# Patient Record
Sex: FEMALE | Race: WHITE | NOT HISPANIC OR LATINO | Employment: OTHER | URBAN - METROPOLITAN AREA
[De-identification: names, ages, dates, MRNs, and addresses within clinical notes are randomized per-mention and may not be internally consistent; named-entity substitution may affect disease eponyms.]

---

## 2017-02-15 ENCOUNTER — OFFICE VISIT (OUTPATIENT)
Dept: CARDIOLOGY | Facility: CLINIC | Age: 62
End: 2017-02-15

## 2017-02-15 VITALS
SYSTOLIC BLOOD PRESSURE: 106 MMHG | WEIGHT: 178.4 LBS | BODY MASS INDEX: 30.46 KG/M2 | DIASTOLIC BLOOD PRESSURE: 68 MMHG | HEIGHT: 64 IN | HEART RATE: 65 BPM

## 2017-02-15 DIAGNOSIS — Z45.018 ELECTIVE REPLACEMENT INDICATED FOR PACEMAKER: ICD-10-CM

## 2017-02-15 DIAGNOSIS — I48.20 CHRONIC ATRIAL FIBRILLATION (HCC): Primary | ICD-10-CM

## 2017-02-15 DIAGNOSIS — I44.30 AVB (ATRIOVENTRICULAR BLOCK): ICD-10-CM

## 2017-02-15 DIAGNOSIS — I07.1 RHEUMATIC TRICUSPID VALVE REGURGITATION: ICD-10-CM

## 2017-02-15 DIAGNOSIS — Q21.10 ASD (ATRIAL SEPTAL DEFECT): ICD-10-CM

## 2017-02-15 PROCEDURE — 93288 INTERROG EVL PM/LDLS PM IP: CPT | Performed by: INTERNAL MEDICINE

## 2017-02-15 RX ORDER — FUROSEMIDE 40 MG/1
TABLET ORAL
Qty: 90 TABLET | Refills: 1 | Status: SHIPPED | OUTPATIENT
Start: 2017-02-15 | End: 2017-08-14 | Stop reason: SDUPTHER

## 2017-02-15 RX ORDER — SPIRONOLACTONE 50 MG/1
TABLET, FILM COATED ORAL
Qty: 90 TABLET | Refills: 1 | Status: SHIPPED | OUTPATIENT
Start: 2017-02-15 | End: 2017-08-14 | Stop reason: SDUPTHER

## 2017-02-15 NOTE — PROGRESS NOTES
Deb Lucianoyajaira  1955  [phone]      02/15/2017    [facility] CARDIOLOGY     No Known Provider  [place] [address]    Chief Complaint   Patient presents with   • Atrial Fibrillation       Problem List:   Atrial Flutter     a. EP study, November 2003, at an outside hospital with no RFA performed.   b. Sotalol initiated, November 2003.   c. Recurrent, persistent atrial flutter with rapid ventricular rate and 1-to-1 AV conduction at 240 BPM.   d. EP study, 04/22/2004, with radiofrequency ablation of atrial flutter around the ASD patch, radiofrequency ablation of isthmus dependent counter clockwise atrial flutter, radiofrequency ablation of right atrial incisional scar flutter and a fourth atrial flutter which was possibly left atrial in origin and was not mapped nor ablated.   e. Recurrent atrial flutter, June 2004.   f. Repeat EP study with successful radiofrequency ablation of a right atrial scar related flutter along the anterolateral wall. Sinus node dysfunction was also demonstrated during that study while she was on both on IV Diltiazem and Digoxin.   g. Echocardiogram, 10/03/2007, with EF 55% to 60%, severe TR, mitral valve area of 1.8 cm² consistent with mild mitral stenosis, LA 4.5.  h. Implantation of a dual chamber permanent pacemaker, 02/25/2008 (Medtronic device).  i. EP study with radiofrequency ablation of atrial tachycardia from the high anterolateral atrial wall, 07/31/2008 with conduction of left atrial flutter, not mapped or ablated.   j. Rythmol increased, 07/31/2008.  k. Hospitalization, 11/13/2014 at [facility] for recurrent atrial tachycardia/flutter treated with cardioversion. Continuation of Norvasc and titration of diltiazem as well as addition of Eliquis therapy.  l. Readmission to [facility] in [place], [place] emergency room secondary to palpitations and syncope on 12/24/2014 with device interrogation  suggesting possible SVT versus ventricular tachycardia.   m. EPS study with RFA for atrial tachycardia, 01/16/2015.   n. Tikosyn initiated 01/16/2015  o. RFA AV Node 4/10/16 and RFA of AT and RA scar flutter   Tr (Tricuspid Regurgitation)     p. DELIA, 04/20/2004 with moderate tricuspid regurgitation, mild holosystolic mitral valve prolapse, marked right atrial enlargement at approximately 7-8 cm, normal LV size and function with LVEF (72%), right enlarged ventricular size and diminished systolic function.   q. Echocardiogram, 02/01/2006: EF (70%), moderately dilated RV, mild TR, mild pulmonary hypertension.   r. Echocardiogram in October 2007: Mild MS, moderate RV enlargement, moderate to severe tricuspid regurgitation, normal LV size and function, RVSP of 45 mmHg.  s. Right heart cath, 08/01/2008 with pulmonary artery pressure of 48/22, RV 53/20, cardiac output 5, cardiac index 2/8, wedge 26.   t. Echocardiogram, April 2009: LVEF (50% to 55%), severe right ventricular enlargement, moderate to severe tricuspid insufficiency, mild to moderate MR, RVSP estimated to be 40-50 mmHg.   u. Echocardiogram, 02/23/2012, with ejection fraction of 50% to 55%, LAE at 4 cm, mild MS with mild restriction of the mitral valve leaflet, prolapse of the posterior leaflet, mean gradient of 3.5 mmHg, mild TR with an RVSP of 31.  v. Echocardiogram, 04/23/2014, revealing EF 45% to 50% with diastolic dysfunction, mild MR, mild TR. RVSP 42.   w. Echo 2/29/16: LVEF NL, mild TR, mild -mod MR, RV severely dilated   Asd (Atrial Septal Defect)     ASD repair at age of 6   Atrial Tachycardia (Resolved)     1. Initiation of Tikosyn on 01/16/2015.   2. Beta blocker added in place of diltiazem.   3. Hospital admission on 04/10/2016.   4. AV node ablation on 04/11/2016, without incident.   5. Discharged on 04/12/2016 feeling good in an AV paced rhythm with a HR of 80.            Allergies  Allergies   Allergen Reactions   • Adenosine Nausea Only   •  "Codeine Nausea Only and Dizziness       Current Medications    Current Outpatient Prescriptions:   •  Dofetilide (TIKOSYN PO), Take 0.5 mg by mouth 2 (two) times a day., Disp: , Rfl:   •  ELIQUIS 5 MG tablet tablet, TAKE 1 TABLET TWICE A DAY, Disp: 180 tablet, Rfl: 2  •  furosemide (LASIX) 40 MG tablet, Take 40 mg by mouth daily., Disp: , Rfl:   •  metoprolol tartrate (LOPRESSOR) 25 MG tablet, Take 1 tablet by mouth daily., Disp: 90 tablet, Rfl: 3  •  rosuvastatin (CRESTOR) 20 MG tablet, Take 20 mg by mouth daily., Disp: , Rfl:   •  spironolactone (ALDACTONE) 50 MG tablet, Take 50 mg by mouth daily., Disp: , Rfl:   •  temazepam (RESTORIL) 30 MG capsule, Take 30 mg by mouth at night as needed for sleep., Disp: , Rfl:     History of Present Illness   HPI    Pt presents for follow up of atrial arrhythmias and PM check. Since we last saw the pt, pt denies any palpitations, CP, LH, and dizziness. Denies any hospitalizations, ER visits, bleeding, or TIA/CVA symptoms. Overall feels well except she is tired, which is chronic.     ROS:  General:  + fatigue, No weight gain or loss  Cardiovascular:  Denies CP, PND, syncope, near syncope, edema + palpitations.  Pulmonary:  Mild BUTT, No cough, or wheezing      Vitals:    02/15/17 1113   BP: 106/68   BP Location: Left arm   Patient Position: Sitting   Pulse: 65   Weight: 178 lb 6.4 oz (80.9 kg)   Height: 64\" (162.6 cm)     PE:  NAD  Neck: no JVD, no carotid bruits, no TM  Heart RRR, NL S1, S2, no rubs, TR murmur  Lungs: CTA  Abd: soft, non-tender, NL BS  Ext: No musculoskeletal deformities    Diagnostic Data:  Procedures    1. Chronic atrial fibrillation    2. AVB (atrioventricular block)    3. ASD (atrial septal defect)    4. Rheumatic tricuspid valve regurgitation          Plan:  1) AF/ tachycardia/flutter s/p RFA AV node: doing well: continue tikosyn  Continue present medications.   2) AVB: Battery at EOL: Will set up for generator change out  3) Anticoagulation CHADVASC " 2  Continue Eliquis          F/up in 6 months    I, Manuel Tavarez MD, personally performed the services described in this documentation as scribed by the above named individual in my presence, and it is both accurate and complete.  2/15/2017  12:12 PM

## 2017-02-20 ENCOUNTER — PREP FOR SURGERY (OUTPATIENT)
Dept: CARDIOLOGY | Facility: CLINIC | Age: 62
End: 2017-02-20

## 2017-02-20 DIAGNOSIS — I44.2 COMPLETE HEART BLOCK (HCC): Primary | ICD-10-CM

## 2017-02-20 RX ORDER — ACETAMINOPHEN 325 MG/1
650 TABLET ORAL EVERY 4 HOURS PRN
Status: CANCELLED | OUTPATIENT
Start: 2017-02-20

## 2017-02-20 RX ORDER — ONDANSETRON 2 MG/ML
4 INJECTION INTRAMUSCULAR; INTRAVENOUS EVERY 6 HOURS PRN
Status: CANCELLED | OUTPATIENT
Start: 2017-02-20

## 2017-02-20 RX ORDER — HYDROCODONE BITARTRATE AND ACETAMINOPHEN 5; 325 MG/1; MG/1
1 TABLET ORAL EVERY 4 HOURS PRN
Status: CANCELLED | OUTPATIENT
Start: 2017-02-20 | End: 2017-03-02

## 2017-02-20 RX ORDER — PROMETHAZINE HYDROCHLORIDE 25 MG/ML
12.5 INJECTION, SOLUTION INTRAMUSCULAR; INTRAVENOUS EVERY 4 HOURS PRN
Status: CANCELLED | OUTPATIENT
Start: 2017-02-20

## 2017-02-25 ENCOUNTER — RESULTS ENCOUNTER (OUTPATIENT)
Dept: CARDIOLOGY | Facility: CLINIC | Age: 62
End: 2017-02-25

## 2017-02-25 DIAGNOSIS — I44.2 COMPLETE HEART BLOCK (HCC): ICD-10-CM

## 2017-03-01 ENCOUNTER — APPOINTMENT (OUTPATIENT)
Dept: PREADMISSION TESTING | Facility: HOSPITAL | Age: 62
End: 2017-03-01

## 2017-03-01 VITALS — HEIGHT: 64 IN | BODY MASS INDEX: 30.39 KG/M2 | WEIGHT: 178 LBS

## 2017-03-01 LAB
ALBUMIN SERPL-MCNC: 4.6 G/DL (ref 3.2–4.8)
ALBUMIN/GLOB SERPL: 1.6 G/DL (ref 1.5–2.5)
ALP SERPL-CCNC: 61 U/L (ref 25–100)
ALT SERPL W P-5'-P-CCNC: 16 U/L (ref 7–40)
ANION GAP SERPL CALCULATED.3IONS-SCNC: 11 MMOL/L (ref 3–11)
AST SERPL-CCNC: 23 U/L (ref 0–33)
BASOPHILS # BLD AUTO: 0.02 10*3/MM3 (ref 0–0.2)
BASOPHILS NFR BLD AUTO: 0.3 % (ref 0–1)
BILIRUB SERPL-MCNC: 1 MG/DL (ref 0.3–1.2)
BUN BLD-MCNC: 15 MG/DL (ref 9–23)
BUN/CREAT SERPL: 18.8 (ref 7–25)
CALCIUM SPEC-SCNC: 10.3 MG/DL (ref 8.7–10.4)
CHLORIDE SERPL-SCNC: 104 MMOL/L (ref 99–109)
CO2 SERPL-SCNC: 25 MMOL/L (ref 20–31)
CREAT BLD-MCNC: 0.8 MG/DL (ref 0.6–1.3)
DEPRECATED RDW RBC AUTO: 47 FL (ref 37–54)
EOSINOPHIL # BLD AUTO: 0.16 10*3/MM3 (ref 0.1–0.3)
EOSINOPHIL NFR BLD AUTO: 2 % (ref 0–3)
ERYTHROCYTE [DISTWIDTH] IN BLOOD BY AUTOMATED COUNT: 13.7 % (ref 11.3–14.5)
GFR SERPL CREATININE-BSD FRML MDRD: 72 ML/MIN/1.73
GLOBULIN UR ELPH-MCNC: 2.8 GM/DL
GLUCOSE BLD-MCNC: 117 MG/DL (ref 70–100)
HBA1C MFR BLD: 6 % (ref 4.8–5.6)
HCT VFR BLD AUTO: 44.9 % (ref 34.5–44)
HGB BLD-MCNC: 14.9 G/DL (ref 11.5–15.5)
IMM GRANULOCYTES # BLD: 0 10*3/MM3 (ref 0–0.03)
IMM GRANULOCYTES NFR BLD: 0 % (ref 0–0.6)
INR PPP: 1.09
LYMPHOCYTES # BLD AUTO: 2.73 10*3/MM3 (ref 0.6–4.8)
LYMPHOCYTES NFR BLD AUTO: 34.2 % (ref 24–44)
MCH RBC QN AUTO: 31 PG (ref 27–31)
MCHC RBC AUTO-ENTMCNC: 33.2 G/DL (ref 32–36)
MCV RBC AUTO: 93.5 FL (ref 80–99)
MONOCYTES # BLD AUTO: 0.53 10*3/MM3 (ref 0–1)
MONOCYTES NFR BLD AUTO: 6.6 % (ref 0–12)
NEUTROPHILS # BLD AUTO: 4.54 10*3/MM3 (ref 1.5–8.3)
NEUTROPHILS NFR BLD AUTO: 56.9 % (ref 41–71)
PLATELET # BLD AUTO: 175 10*3/MM3 (ref 150–450)
PMV BLD AUTO: 10 FL (ref 6–12)
POTASSIUM BLD-SCNC: 4.3 MMOL/L (ref 3.5–5.5)
PROT SERPL-MCNC: 7.4 G/DL (ref 5.7–8.2)
PROTHROMBIN TIME: 11.9 SECONDS (ref 9.6–11.5)
RBC # BLD AUTO: 4.8 10*6/MM3 (ref 3.89–5.14)
SODIUM BLD-SCNC: 140 MMOL/L (ref 132–146)
WBC NRBC COR # BLD: 7.98 10*3/MM3 (ref 3.5–10.8)

## 2017-03-01 PROCEDURE — 85610 PROTHROMBIN TIME: CPT | Performed by: NURSE PRACTITIONER

## 2017-03-01 PROCEDURE — 80053 COMPREHEN METABOLIC PANEL: CPT | Performed by: NURSE PRACTITIONER

## 2017-03-01 PROCEDURE — 36415 COLL VENOUS BLD VENIPUNCTURE: CPT | Performed by: NURSE PRACTITIONER

## 2017-03-01 PROCEDURE — 83036 HEMOGLOBIN GLYCOSYLATED A1C: CPT | Performed by: NURSE PRACTITIONER

## 2017-03-01 PROCEDURE — 85025 COMPLETE CBC W/AUTO DIFF WBC: CPT | Performed by: NURSE PRACTITIONER

## 2017-03-01 RX ORDER — CLOBETASOL PROPIONATE 0.5 MG/G
EMULSION TOPICAL 2 TIMES DAILY
Refills: 5 | COMMUNITY
Start: 2016-11-22 | End: 2018-02-13

## 2017-03-01 RX ORDER — POLYETHYLENE GLYCOL 3350 17 G/17G
17 POWDER, FOR SOLUTION ORAL DAILY PRN
COMMUNITY
End: 2020-08-11

## 2017-03-01 RX ORDER — LEVOTHYROXINE SODIUM 0.07 MG/1
75 TABLET ORAL DAILY
Refills: 5 | COMMUNITY
Start: 2017-02-16 | End: 2021-01-01 | Stop reason: DRUGHIGH

## 2017-03-01 NOTE — DISCHARGE INSTRUCTIONS
The following instructions given during Pre Admission Testing visit:    Do not eat or drink anything after MN except for sips of water with your a.m. Prescription meds unless otherwise instructed by your physician.    Glasses and jewelry may be worn, but dentures must be removed prior to cath/procedure.    Leave any items you consider valuable at home.    Family members may wait in [facility] waiting area during procedure.    Bring all medications in their original containers the day of procedure.    Bring photo ID and insurance cards on the day of procedure.    Need to make arrangements for transportation prior to discharge.    Patient to apply Chlorhexadine wipes  to surgical area (as instructed) the night before procedure and the AM of procedure. Wipes provided.

## 2017-03-02 ENCOUNTER — HOSPITAL ENCOUNTER (OUTPATIENT)
Facility: HOSPITAL | Age: 62
Discharge: HOME | End: 2017-03-02
Attending: INTERNAL MEDICINE | Admitting: INTERNAL MEDICINE

## 2017-03-02 VITALS
RESPIRATION RATE: 15 BRPM | HEIGHT: 64 IN | WEIGHT: 179.01 LBS | TEMPERATURE: 98.1 F | SYSTOLIC BLOOD PRESSURE: 124 MMHG | BODY MASS INDEX: 30.56 KG/M2 | OXYGEN SATURATION: 94 % | HEART RATE: 70 BPM | DIASTOLIC BLOOD PRESSURE: 78 MMHG

## 2017-03-02 DIAGNOSIS — Z45.018 ELECTIVE REPLACEMENT INDICATED FOR PACEMAKER: ICD-10-CM

## 2017-03-02 DIAGNOSIS — I44.2 COMPLETE HEART BLOCK (HCC): ICD-10-CM

## 2017-03-02 PROCEDURE — G0378 HOSPITAL OBSERVATION PER HR: HCPCS

## 2017-03-02 PROCEDURE — 25010000002 VANCOMYCIN HCL IN NACL 1.5-0.9 GM/500ML-% SOLUTION: Performed by: NURSE PRACTITIONER

## 2017-03-02 PROCEDURE — 99152 MOD SED SAME PHYS/QHP 5/>YRS: CPT | Performed by: INTERNAL MEDICINE

## 2017-03-02 PROCEDURE — 25010000002 MIDAZOLAM PER 1 MG: Performed by: INTERNAL MEDICINE

## 2017-03-02 PROCEDURE — C1785 PMKR, DUAL, RATE-RESP: HCPCS | Performed by: INTERNAL MEDICINE

## 2017-03-02 PROCEDURE — 25010000002 ONDANSETRON PER 1 MG: Performed by: INTERNAL MEDICINE

## 2017-03-02 PROCEDURE — 25010000002 FENTANYL CITRATE (PF) 100 MCG/2ML SOLUTION: Performed by: INTERNAL MEDICINE

## 2017-03-02 PROCEDURE — 33228 REMV&REPLC PM GEN DUAL LEAD: CPT | Performed by: INTERNAL MEDICINE

## 2017-03-02 DEVICE — GEN PM ADAPTA DR MVP 41.7MM ADDR01: Type: IMPLANTABLE DEVICE | Site: CHEST WALL | Status: FUNCTIONAL

## 2017-03-02 RX ORDER — DOFETILIDE 0.5 MG/1
500 CAPSULE ORAL 2 TIMES DAILY
Status: DISCONTINUED | OUTPATIENT
Start: 2017-03-02 | End: 2017-03-02 | Stop reason: HOSPADM

## 2017-03-02 RX ORDER — ACETAMINOPHEN 325 MG/1
650 TABLET ORAL EVERY 4 HOURS PRN
Status: DISCONTINUED | OUTPATIENT
Start: 2017-03-02 | End: 2017-03-02 | Stop reason: HOSPADM

## 2017-03-02 RX ORDER — VANCOMYCIN/0.9 % SOD CHLORIDE 1.5G/250ML
20 PLASTIC BAG, INJECTION (ML) INTRAVENOUS
Status: COMPLETED | OUTPATIENT
Start: 2017-03-02 | End: 2017-03-02

## 2017-03-02 RX ORDER — SODIUM CHLORIDE 0.9 % (FLUSH) 0.9 %
1-10 SYRINGE (ML) INJECTION AS NEEDED
Status: DISCONTINUED | OUTPATIENT
Start: 2017-03-02 | End: 2017-03-02 | Stop reason: HOSPADM

## 2017-03-02 RX ORDER — SODIUM CHLORIDE 9 MG/ML
INJECTION, SOLUTION INTRAVENOUS CONTINUOUS PRN
Status: DISCONTINUED | OUTPATIENT
Start: 2017-03-02 | End: 2017-03-02 | Stop reason: HOSPADM

## 2017-03-02 RX ORDER — ONDANSETRON 2 MG/ML
4 INJECTION INTRAMUSCULAR; INTRAVENOUS EVERY 6 HOURS PRN
Status: DISCONTINUED | OUTPATIENT
Start: 2017-03-02 | End: 2017-03-02 | Stop reason: HOSPADM

## 2017-03-02 RX ORDER — SPIRONOLACTONE 50 MG/1
50 TABLET, FILM COATED ORAL DAILY
Status: DISCONTINUED | OUTPATIENT
Start: 2017-03-02 | End: 2017-03-02 | Stop reason: HOSPADM

## 2017-03-02 RX ORDER — ONDANSETRON 2 MG/ML
4 INJECTION INTRAMUSCULAR; INTRAVENOUS EVERY 6 HOURS PRN
Status: DISCONTINUED | OUTPATIENT
Start: 2017-03-02 | End: 2017-03-02

## 2017-03-02 RX ORDER — FUROSEMIDE 40 MG/1
40 TABLET ORAL DAILY
Status: DISCONTINUED | OUTPATIENT
Start: 2017-03-02 | End: 2017-03-02 | Stop reason: HOSPADM

## 2017-03-02 RX ORDER — PROMETHAZINE HYDROCHLORIDE 25 MG/ML
12.5 INJECTION, SOLUTION INTRAMUSCULAR; INTRAVENOUS EVERY 4 HOURS PRN
Status: DISCONTINUED | OUTPATIENT
Start: 2017-03-02 | End: 2017-03-02 | Stop reason: HOSPADM

## 2017-03-02 RX ORDER — MIDAZOLAM HYDROCHLORIDE 1 MG/ML
INJECTION INTRAMUSCULAR; INTRAVENOUS AS NEEDED
Status: DISCONTINUED | OUTPATIENT
Start: 2017-03-02 | End: 2017-03-02 | Stop reason: HOSPADM

## 2017-03-02 RX ORDER — LIDOCAINE HYDROCHLORIDE 10 MG/ML
INJECTION, SOLUTION INFILTRATION; PERINEURAL AS NEEDED
Status: DISCONTINUED | OUTPATIENT
Start: 2017-03-02 | End: 2017-03-02 | Stop reason: HOSPADM

## 2017-03-02 RX ORDER — ONDANSETRON 2 MG/ML
INJECTION INTRAMUSCULAR; INTRAVENOUS AS NEEDED
Status: DISCONTINUED | OUTPATIENT
Start: 2017-03-02 | End: 2017-03-02 | Stop reason: HOSPADM

## 2017-03-02 RX ORDER — ATORVASTATIN CALCIUM 40 MG/1
40 TABLET, FILM COATED ORAL DAILY
Status: DISCONTINUED | OUTPATIENT
Start: 2017-03-02 | End: 2017-03-02 | Stop reason: HOSPADM

## 2017-03-02 RX ORDER — HYDROCODONE BITARTRATE AND ACETAMINOPHEN 5; 325 MG/1; MG/1
1 TABLET ORAL EVERY 4 HOURS PRN
Status: DISCONTINUED | OUTPATIENT
Start: 2017-03-02 | End: 2017-03-02 | Stop reason: HOSPADM

## 2017-03-02 RX ORDER — LEVOTHYROXINE SODIUM 0.03 MG/1
25 TABLET ORAL DAILY
Status: DISCONTINUED | OUTPATIENT
Start: 2017-03-02 | End: 2017-03-02 | Stop reason: HOSPADM

## 2017-03-02 RX ORDER — FENTANYL CITRATE 50 UG/ML
INJECTION, SOLUTION INTRAMUSCULAR; INTRAVENOUS AS NEEDED
Status: DISCONTINUED | OUTPATIENT
Start: 2017-03-02 | End: 2017-03-02 | Stop reason: HOSPADM

## 2017-03-02 RX ORDER — BUPIVACAINE HYDROCHLORIDE 5 MG/ML
INJECTION, SOLUTION EPIDURAL; INTRACAUDAL AS NEEDED
Status: DISCONTINUED | OUTPATIENT
Start: 2017-03-02 | End: 2017-03-02 | Stop reason: HOSPADM

## 2017-03-02 RX ADMIN — Medication 1500 MG: at 08:06

## 2017-03-02 NOTE — PLAN OF CARE
Problem: Patient Care Overview (Adult)  Goal: Plan of Care Review  Outcome: Ongoing (interventions implemented as appropriate)    03/02/17 1120   Coping/Psychosocial Response Interventions   Plan Of Care Reviewed With patient;family   Patient Care Overview   Progress improving   Outcome Evaluation   Outcome Summary/Follow up Plan PT AND FAMILY ABLE TO VERBALIZE UNDERSTANDING OF DC INSTRUCTIONS- NO QUESTIONS AT THIS TIME- SITE STABLE AND AMBULATING WITH NO ISSUES.        Goal: Discharge Needs Assessment  Outcome: Ongoing (interventions implemented as appropriate)    03/02/17 1120   Discharge Needs Assessment   Concerns To Be Addressed no discharge needs identified         Problem: Cardiac Rhythm Management Device (Adult)  Goal: Signs and Symptoms of Listed Potential Problems Will be Absent or Manageable (Cardiac Rhythm Management Device)  Outcome: Ongoing (interventions implemented as appropriate)    03/02/17 1120   Cardiac Rhythm Management Device   Problems Assessed (Cardiac Rhythm Management Device) all   Problems Present (Cardiac Rhythm Management Device) none

## 2017-03-02 NOTE — NURSING NOTE
Pt does not have a qualifying diagnosis for Phase II Cardiac Rehab at this time. Staff is available if further contact is needed.

## 2017-03-02 NOTE — H&P
Cardiology     Deb Lucianoyajaira  1955  [phone]      03/02/17      [facility]    Minerva Bucio MD   [facility]  / [place] [id number]    CC: PM generator change    PROBLEM LIST:  1. Atrial flutter:  a. EP study, November 2003, at an outside hospital with no RFA performed.   b. Sotalol initiated, November 2003.   c. Recurrent, persistent atrial flutter with rapid ventricular rate and 1-to-1 AV conduction at 240 BPM.   d. EP study, 04/22/2004, with radiofrequency ablation of atrial flutter around the ASD patch, radiofrequency ablation of isthmus dependent counter clockwise atrial flutter, radiofrequency ablation of right atrial incisional scar flutter and a fourth atrial flutter which was possibly left atrial in origin and was not mapped nor ablated.   e. Recurrent atrial flutter, June 2004.   f. Repeat EP study with successful radiofrequency ablation of a right atrial scar related flutter along the anterolateral wall. Sinus node dysfunction was also demonstrated during that study while she was on both on IV Diltiazem and Digoxin.   g. Echocardiogram, 10/03/2007, with EF 55% to 60%, severe TR, mitral valve area of 1.8 cm² consistent with mild mitral stenosis, LA 4.5.  h. Implantation of a dual chamber permanent pacemaker, 02/25/2008 (Medtronic device).  i. EP study with radiofrequency ablation of atrial tachycardia from the high anterolateral atrial wall, 07/31/2008 with conduction of left atrial flutter, not mapped or ablated.   j. Rythmol increased, 07/31/2008.  k. Hospitalization, 11/13/2014 at [facility] for recurrent atrial tachycardia/flutter treated with cardioversion. Continuation of Norvasc and titration of diltiazem as well as addition of Eliquis therapy.  l. Readmission to [facility] in [place], [place] emergency room secondary to palpitations and syncope on 12/24/2014 with device interrogation suggesting possible SVT versus ventricular  tachycardia.   m. EPS study with RFA for atrial tachycardia, 01/16/2015.   n. Tikosyn initiated 01/16/2015.  o.  AVN RFA 4/10/16 and RFA AT and RA scar flutter  2. Junctional rhythm.  3. ASD repair at the age of 6.   4. Tricuspid regurgitation:   a. DELIA, 04/20/2004 with moderate tricuspid regurgitation, mild holosystolic mitral valve prolapse, marked right atrial enlargement at approximately 7-8 cm, normal LV size and function with LVEF (72%), right enlarged ventricular size and diminished systolic function.   b. Echocardiogram, 02/01/2006: EF (70%), moderately dilated RV, mild TR, mild pulmonary hypertension.   c. Echocardiogram in October 2007: Mild MS, moderate RV enlargement, moderate to severe tricuspid regurgitation, normal LV size and function, RVSP of 45 mmHg.  d. Right heart cath, 08/01/2008 with pulmonary artery pressure of 48/22, RV 53/20, cardiac output 5, cardiac index 2/8, wedge 26.   e. Echocardiogram, April 2009: LVEF (50% to 55%), severe right ventricular enlargement, moderate to severe tricuspid insufficiency, mild to moderate MR, RVSP estimated to be 40-50 mmHg.   f. Echocardiogram, 02/23/2012, with ejection fraction of 50% to 55%, LAE at 4 cm, mild MS with mild restriction of the mitral valve leaflet, prolapse of the posterior leaflet, mean gradient of 3.5 mmHg, mild TR with an RVSP of 31.  g. Echocardiogram, 04/23/2014, revealing EF 45% to 50% with diastolic dysfunction, mild MR, mild TR. RVSP 42.   5. Tobacco abuse.   6. Seasonal allergies.   7. Right hip surgery, 06/04/2015.      History of Present Illness:   61 year old WF with history of SSS s/p PM implant in 2008, atrial arrhythmias s/p multiple ablations and ultimately AVN ablation, VHD, and ASD s/p repair who presents today for generator change of her Pacemaker as it has reached EOL. She was seen in clinic a couple weeks ago and was doing well at that time. Since then, she continues to do well and has had no changes in her symptoms since  that time.     Allergies   Allergen Reactions   • Adenosine Nausea Only   • Codeine Nausea Only and Dizziness       Prior to Admission Medications     Prescriptions Last Dose Informant Patient Reported? Taking?    CLOBETASOL PROPIONATE E 0.05 % emollient cream 3/1/2017 Medication Bottle Yes Yes    Apply  topically 2 (Two) Times a Day.    Dofetilide (TIKOSYN PO) 3/1/2017 Medication Bottle Yes Yes    Take 0.5 mg by mouth 2 (two) times a day.    ELIQUIS 5 MG tablet tablet 3/1/2017 Medication Bottle No Yes    TAKE 1 TABLET TWICE A DAY    furosemide (LASIX) 40 MG tablet 3/1/2017 Medication Bottle No Yes    TAKE 1 TABLET DAILY    levothyroxine (SYNTHROID, LEVOTHROID) 25 MCG tablet 3/1/2017 Medication Bottle Yes Yes    Take 25 mcg by mouth Daily.    metoprolol tartrate (LOPRESSOR) 25 MG tablet 3/1/2017 Medication Bottle No Yes    Take 1 tablet by mouth daily.    polyethylene glycol (MIRALAX) packet 3/1/2017 Medication Bottle Yes Yes    Take 17 g by mouth Daily.    rosuvastatin (CRESTOR) 20 MG tablet 3/1/2017 Medication Bottle Yes Yes    Take 20 mg by mouth daily.    spironolactone (ALDACTONE) 50 MG tablet 3/1/2017 Medication Bottle No Yes    TAKE 1 TABLET DAILY    temazepam (RESTORIL) 30 MG capsule 3/1/2017 Medication Bottle Yes Yes    Take 30 mg by mouth at night as needed for sleep.            Current Facility-Administered Medications:   •  acetaminophen (TYLENOL) tablet 650 mg, 650 mg, Oral, Q4H PRN, NIRU Lawrence  •  HYDROcodone-acetaminophen (NORCO) 5-325 MG per tablet 1 tablet, 1 tablet, Oral, Q4H PRN, NIRU Lawrence  •  ondansetron (ZOFRAN) injection 4 mg, 4 mg, Intravenous, Q6H PRN, NIRU Lawrence  •  promethazine (PHENERGAN) injection 12.5 mg, 12.5 mg, Intravenous, Q4H PRN, NIRU Lawrence  •  vancomycin IVPB 1500 mg in 0.9% NaCl (Premix) 500 mL, 20 mg/kg (Order-Specific), Intravenous, 60 Min Pre-Op, NIRU Lawrence    Social History     Social History   • Marital status:      Spouse  "name: N/A   • Number of children: N/A   • Years of education: N/A     Social History Main Topics   • Smoking status: Former Smoker     Packs/day: 1.00     Years: 30.00     Types: Cigarettes     Quit date: 2008   • Smokeless tobacco: Never Used      Comment: quit in 2008   • Alcohol use No   • Drug use: No   • Sexual activity: Defer     Other Topics Concern   • None     Social History Narrative       Family History: history of DM, 1 brother with melanoma and sister with diabetes    REVIEW OF SYSTEMS:   CONST:  No weight loss, fever, chills, weakness or fatigue.   HEENT:  No visual loss, blurred vision, double vision, yellow sclerae.                   No hearing loss, congestion, sore throat.   SKIN:      No rashes, urticaria, ulcers, sores.     RESP:     No shortness of breath, hemoptysis, cough, sputum.   GI:           No anorexia, nausea, vomiting, diarrhea. No abdominal pain, melena.   :         No burning on urination, hematuria or increased frequency.  ENDO:    No diaphoresis, cold or heat intolerance. No polyuria or polydipsia.   NEURO:  No headache, dizziness, syncope, paralysis, ataxia, or parasthesias.                  No change in bowel or bladder control. No history of CVA/TIA  MUSC:    No muscle, back pain, joint pain or stiffness.   HEME:    No anemia, bleeding, bruising. No history of DVT/PE.  PSYCH:  No history of depression, anxiety    Vitals:    03/02/17 0630 03/02/17 0631   BP: 134/68 131/64   BP Location: Right arm Left arm   Patient Position: Lying Lying   Pulse: 65    Resp: 16    Temp: 98.1 °F (36.7 °C)    TempSrc: Tympanic    SpO2: 92%    Weight: 179 lb 0.2 oz (81.2 kg)    Height: 64\" (162.6 cm)        Physical Exam:  GEN: Well nourished, Well- developed  No acute distress  HEENT: Normocephalic, Atraumatic, PERRLA, moist mucous membranes  NECK: supple, NO JVD, no thyromegaly, no lymphadenopathy  CARD: S1S2  RRR no murmur, gallop, rub  LUNGS: Clear to auscultataion, normal respiratory " effort  ABDOMEN: Soft, nontender, normal bowel sounds  EXTREMITIES:No gross deformities,  No clubbing, cyanosis, or edema  SKIN: Warm, dry, intact, no rashes, open wounds  NEURO: No focal deficits  PSYCHIATRIC: Normal affect and mood      Data:     Results from last 7 days  Lab Units 03/01/17  1605   WBC 10*3/mm3 7.98   HEMOGLOBIN g/dL 14.9   HEMATOCRIT % 44.9*   PLATELETS 10*3/mm3 175       Results from last 7 days  Lab Units 03/01/17  1605   SODIUM mmol/L 140   POTASSIUM mmol/L 4.3   CHLORIDE mmol/L 104   TOTAL CO2 mmol/L 25.0   BUN mg/dL 15   CREATININE mg/dL 0.80   GLUCOSE mg/dL 117*        Results from last 7 days  Lab Units 03/01/17  1605   HEMOGLOBIN A1C % 6.00*                   Results from last 7 days  Lab Units 03/01/17  1605   PROTIME Seconds 11.9*   INR  1.09           Tele: V paced      Assessment and Plan:   1. Elective replacement indicated for pacemaker    2. Complete heart block      1) AF/ tachycardia/flutter s/p RFA AV node: doing well: continue tikosyn  2) AVB s/p AVN ablation: Battery at EOL on PM:for generator change out today. The risks, benefits, and alternatives of the procedure have been reviewed and the patient wishes to proceed.   3) Anticoagulation CHADVASC 2  Continue Nasrinquis    [facility]  3/2/2017   7:15 AM        IManuel MD, personally performed the services described as documented by the above named individual. I have made any necessary edits and it is both accurate and complete 3/2/2017  8:12 AM

## 2017-03-10 ENCOUNTER — OFFICE VISIT (OUTPATIENT)
Dept: CARDIOLOGY | Facility: CLINIC | Age: 62
End: 2017-03-10

## 2017-03-10 DIAGNOSIS — I44.2 COMPLETE HEART BLOCK (HCC): Primary | ICD-10-CM

## 2017-03-10 PROCEDURE — 99024 POSTOP FOLLOW-UP VISIT: CPT | Performed by: INTERNAL MEDICINE

## 2017-03-10 NOTE — PROGRESS NOTES
WOUND CHECK    03/10/2017    Deb Harrison, : 1955    WOUND CHECK    B/P: 110/68 (Sitting)    Pulse: 81    Patient has fever: [] Temperature if indicated: 97.5    Wound Location: Left Infraclavicular     Dressing Removed [x]        Old Dressing Appearance:  Clean, dry []                 Old, bloody drainage [x]                            Moist, serous drainage []                Moist, thick yellow/green drainage []       Wound Appearance: Redness []                  Drainage []                  Culture obtained []        Color: N/A     Consistency: N/A     Amount: none         Gloves used, wound cleansed with sterile 4x4 and peroxide [x]       MD notified [] MD orders:     Antibiotic started []  If checked, type   Other:     Appointment for follow-up scheduled for 3 months post procedure [x]    Future Appointments  Date Time Provider Department Center   2017 10:15 AM PACEART, [facility] None   2017 11:00 AM MD ANDREW Bates [facility] None           Elizabeth June, 03/10/17      MD Signature:______________________________ Completed By/Date:

## 2017-06-13 ENCOUNTER — OFFICE VISIT (OUTPATIENT)
Dept: CARDIOLOGY | Facility: CLINIC | Age: 62
End: 2017-06-13

## 2017-06-13 VITALS
HEART RATE: 76 BPM | DIASTOLIC BLOOD PRESSURE: 68 MMHG | SYSTOLIC BLOOD PRESSURE: 116 MMHG | HEIGHT: 69 IN | BODY MASS INDEX: 25.18 KG/M2 | WEIGHT: 170 LBS

## 2017-06-13 DIAGNOSIS — Q21.10 ASD (ATRIAL SEPTAL DEFECT): ICD-10-CM

## 2017-06-13 DIAGNOSIS — I44.30 AVB (ATRIOVENTRICULAR BLOCK): ICD-10-CM

## 2017-06-13 DIAGNOSIS — I48.4 ATYPICAL ATRIAL FLUTTER (HCC): Primary | ICD-10-CM

## 2017-06-13 DIAGNOSIS — I07.1 RHEUMATIC TRICUSPID VALVE REGURGITATION: ICD-10-CM

## 2017-06-13 PROCEDURE — 99213 OFFICE O/P EST LOW 20 MIN: CPT | Performed by: INTERNAL MEDICINE

## 2017-06-13 PROCEDURE — 93280 PM DEVICE PROGR EVAL DUAL: CPT | Performed by: INTERNAL MEDICINE

## 2017-06-13 NOTE — PROGRESS NOTES
Deb Lucianoyajaira  1955  [phone]      06/13/2017    [facility] CARDIOLOGY     Minerva Bucio MD  [address] [place] [address] DR THERESA RODRIGUEZ [id number]    Chief Complaint   Patient presents with   • Slow Heart Rate   • Rapid Heart Rate       Problem List:  Atrial Flutter      a. EP study, November 2003, at an outside hospital with no RFA performed.   b. Sotalol initiated, November 2003.   c. Recurrent, persistent atrial flutter with rapid ventricular rate and 1-to-1 AV conduction at 240 BPM.   d. EP study, 04/22/2004, with radiofrequency ablation of atrial flutter around the ASD patch, radiofrequency ablation of isthmus dependent counter clockwise atrial flutter, radiofrequency ablation of right atrial incisional scar flutter and a fourth atrial flutter which was possibly left atrial in origin and was not mapped nor ablated.   e. Recurrent atrial flutter, June 2004.   f. Repeat EP study with successful radiofrequency ablation of a right atrial scar related flutter along the anterolateral wall. Sinus node dysfunction was also demonstrated during that study while she was on both on IV Diltiazem and Digoxin.   g. Echocardiogram, 10/03/2007, with EF 55% to 60%, severe TR, mitral valve area of 1.8 cm² consistent with mild mitral stenosis, LA 4.5.  h. Implantation of a dual chamber permanent pacemaker, 02/25/2008 (Medtronic device).  i. EP study with radiofrequency ablation of atrial tachycardia from the high anterolateral atrial wall, 07/31/2008 with conduction of left atrial flutter, not mapped or ablated.   j. Rythmol increased, 07/31/2008.  k. Hospitalization, 11/13/2014 at [facility] for recurrent atrial tachycardia/flutter treated with cardioversion. Continuation of Norvasc and titration of diltiazem as well as addition of Eliquis therapy.  l. Readmission to [facility] in [place], [place] emergency room secondary to palpitations and syncope on 12/24/2014 with  device interrogation suggesting possible SVT versus ventricular tachycardia.   m. EPS study with RFA for atrial tachycardia, 01/16/2015.   n. Tikosyn initiated 01/16/2015  o. RFA AV Node 4/10/16 and RFA of AT and RA scar flutter   Tr (Tricuspid Regurgitation)      p. DELIA, 04/20/2004 with moderate tricuspid regurgitation, mild holosystolic mitral valve prolapse, marked right atrial enlargement at approximately 7-8 cm, normal LV size and function with LVEF (72%), right enlarged ventricular size and diminished systolic function.   q. Echocardiogram, 02/01/2006: EF (70%), moderately dilated RV, mild TR, mild pulmonary hypertension.   r. Echocardiogram in October 2007: Mild MS, moderate RV enlargement, moderate to severe tricuspid regurgitation, normal LV size and function, RVSP of 45 mmHg.  s. Right heart cath, 08/01/2008 with pulmonary artery pressure of 48/22, RV 53/20, cardiac output 5, cardiac index 2/8, wedge 26.   t. Echocardiogram, April 2009: LVEF (50% to 55%), severe right ventricular enlargement, moderate to severe tricuspid insufficiency, mild to moderate MR, RVSP estimated to be 40-50 mmHg.   u. Echocardiogram, 02/23/2012, with ejection fraction of 50% to 55%, LAE at 4 cm, mild MS with mild restriction of the mitral valve leaflet, prolapse of the posterior leaflet, mean gradient of 3.5 mmHg, mild TR with an RVSP of 31.  v. Echocardiogram, 04/23/2014, revealing EF 45% to 50% with diastolic dysfunction, mild MR, mild TR. RVSP 42.   w. Echo 2/29/16: LVEF NL, mild TR, mild -mod MR, RV severely dilated   Asd (Atrial Septal Defect)      ASD repair at age of 6     AVB       PM generator change out 3/2/17    Atrial Tachycardia (Resolved)      1. Initiation of Tikosyn on 01/16/2015.   2. Beta blocker added in place of diltiazem.   3. Hospital admission on 04/10/2016.   4. AV node ablation on 04/11/2016, without incident.     .                 Allergies  Allergies   Allergen Reactions   • Adenosine Nausea Only   •  "Codeine Nausea Only and Dizziness       Current Medications    Current Outpatient Prescriptions:   •  apixaban (ELIQUIS) 5 MG tablet tablet, First dose on march 4, Disp: 180 tablet, Rfl: 2  •  CLOBETASOL PROPIONATE E 0.05 % emollient cream, Apply  topically 2 (Two) Times a Day., Disp: , Rfl: 5  •  Dofetilide (TIKOSYN PO), Take 0.5 mg by mouth 2 (two) times a day., Disp: , Rfl:   •  furosemide (LASIX) 40 MG tablet, TAKE 1 TABLET DAILY, Disp: 90 tablet, Rfl: 1  •  levothyroxine (SYNTHROID, LEVOTHROID) 25 MCG tablet, Take 25 mcg by mouth Daily., Disp: , Rfl: 5  •  metoprolol tartrate (LOPRESSOR) 25 MG tablet, Take 1 tablet by mouth daily., Disp: 90 tablet, Rfl: 3  •  polyethylene glycol (MIRALAX) packet, Take 17 g by mouth Daily., Disp: , Rfl:   •  rosuvastatin (CRESTOR) 20 MG tablet, Take 20 mg by mouth daily., Disp: , Rfl:   •  spironolactone (ALDACTONE) 50 MG tablet, TAKE 1 TABLET DAILY, Disp: 90 tablet, Rfl: 1  •  temazepam (RESTORIL) 30 MG capsule, Take 30 mg by mouth at night as needed for sleep., Disp: , Rfl:     History of Present Illness   HPI     Pt presents for follow up of AF/AF/AFL/AVB/CHD.Since the pt has seen us in clinic last, pt denies any syncope, CP, LH, and dizziness. Denies any hospitalizations, ER visits, bleeding, or TIA/CVA symptoms. Overall feels well except for fatigue and chronic SOB on oxygen. Tolerating the tikosyn well.    ROS:  General:  + fatigue, No weight gain or loss  Cardiovascular:  Denies CP, PND, syncope, near syncope, edema,  rare palpitations.  Pulmonary:   Chronic BUTT, No cough, or wheezing    Vitals:    06/13/17 1122   BP: 116/68   BP Location: Left arm   Pulse: 76   Weight: 170 lb (77.1 kg)   Height: 69\" (175.3 cm)       PE:  General: NAD  Neck: no JVD, no carotid bruits, no TM  Heart RRR, NL S1, S2, S4 present, no rubs, TR murmur present  Lungs: CTA, no wheezes, rhonchi, or rales  Abd: soft, non-tender, NL BS  Ext: No musculoskeletal deformities, no edema, cyanosis, or " clubbing  Psych: normal mood and affect    Diagnostic Data:  Procedures      1. Atypical atrial flutter    2. Rheumatic tricuspid valve regurgitation    3. ASD (atrial septal defect)    4. AVB (atrioventricular block)        PM Interrogation: NL PM fxn, Nl battery fxn, No AF     Plan:     Plan:  1) AF/ tachycardia/flutter s/p RFA AV node: doing well: continue tikosyn  Continue present medications.   2) AVB: PM fxn NL  3) Anticoagulation CHADVASC 2  Continue Eliquis  4) CHD    F/up in 6 months

## 2017-07-05 RX ORDER — APIXABAN 5 MG/1
TABLET, FILM COATED ORAL
Qty: 180 TABLET | Refills: 1 | Status: SHIPPED | OUTPATIENT
Start: 2017-07-05 | End: 2018-01-01 | Stop reason: SDUPTHER

## 2017-08-14 RX ORDER — FUROSEMIDE 40 MG/1
TABLET ORAL
Qty: 90 TABLET | Refills: 3 | Status: SHIPPED | OUTPATIENT
Start: 2017-08-14 | End: 2018-08-09 | Stop reason: SDUPTHER

## 2017-08-14 RX ORDER — SPIRONOLACTONE 50 MG/1
TABLET, FILM COATED ORAL
Qty: 90 TABLET | Refills: 3 | Status: SHIPPED | OUTPATIENT
Start: 2017-08-14 | End: 2018-11-19 | Stop reason: SDUPTHER

## 2017-08-25 RX ORDER — DOFETILIDE 0.5 MG/1
CAPSULE ORAL
Qty: 180 CAPSULE | Refills: 2 | Status: SHIPPED | OUTPATIENT
Start: 2017-08-25 | End: 2018-05-22 | Stop reason: SDUPTHER

## 2017-09-28 ENCOUNTER — TELEPHONE (OUTPATIENT)
Dept: CARDIOLOGY | Facility: CLINIC | Age: 62
End: 2017-09-28

## 2017-09-28 PROCEDURE — 93294 REM INTERROG EVL PM/LDLS PM: CPT | Performed by: INTERNAL MEDICINE

## 2017-09-28 PROCEDURE — 93296 REM INTERROG EVL PM/IDS: CPT | Performed by: INTERNAL MEDICINE

## 2017-09-28 NOTE — TELEPHONE ENCOUNTER
"Called pt to ask if I could help her set her home monitor up and she stated she was \"busy\" and would call me back.  "

## 2017-10-04 ENCOUNTER — CLINICAL SUPPORT NO REQUIREMENTS (OUTPATIENT)
Dept: CARDIOLOGY | Facility: CLINIC | Age: 62
End: 2017-10-04

## 2017-10-04 DIAGNOSIS — I44.30 ATRIOVENTRICULAR BLOCK: ICD-10-CM

## 2018-01-02 RX ORDER — APIXABAN 5 MG/1
TABLET, FILM COATED ORAL
Qty: 180 TABLET | Refills: 3 | Status: ON HOLD | OUTPATIENT
Start: 2018-01-02 | End: 2018-08-29

## 2018-01-08 ENCOUNTER — OFFICE VISIT CONVERTED (OUTPATIENT)
Dept: FAMILY MEDICINE CLINIC | Facility: CLINIC | Age: 63
End: 2018-01-08
Attending: FAMILY MEDICINE

## 2018-01-24 ENCOUNTER — OFFICE VISIT CONVERTED (OUTPATIENT)
Dept: PULMONOLOGY | Facility: CLINIC | Age: 63
End: 2018-01-24
Attending: INTERNAL MEDICINE

## 2018-02-13 ENCOUNTER — OFFICE VISIT (OUTPATIENT)
Dept: CARDIOLOGY | Facility: CLINIC | Age: 63
End: 2018-02-13

## 2018-02-13 VITALS
WEIGHT: 170 LBS | DIASTOLIC BLOOD PRESSURE: 70 MMHG | BODY MASS INDEX: 29.02 KG/M2 | HEART RATE: 79 BPM | HEIGHT: 64 IN | SYSTOLIC BLOOD PRESSURE: 100 MMHG

## 2018-02-13 DIAGNOSIS — I44.2 CHB (COMPLETE HEART BLOCK) (HCC): ICD-10-CM

## 2018-02-13 DIAGNOSIS — Q21.10 ASD (ATRIAL SEPTAL DEFECT): ICD-10-CM

## 2018-02-13 DIAGNOSIS — R06.09 DOE (DYSPNEA ON EXERTION): Primary | ICD-10-CM

## 2018-02-13 DIAGNOSIS — I38 VHD (VALVULAR HEART DISEASE): ICD-10-CM

## 2018-02-13 DIAGNOSIS — I47.1 ATRIAL TACHYCARDIA (HCC): ICD-10-CM

## 2018-02-13 DIAGNOSIS — I48.4 ATYPICAL ATRIAL FLUTTER (HCC): ICD-10-CM

## 2018-02-13 PROCEDURE — 99214 OFFICE O/P EST MOD 30 MIN: CPT | Performed by: INTERNAL MEDICINE

## 2018-02-13 PROCEDURE — 93280 PM DEVICE PROGR EVAL DUAL: CPT | Performed by: INTERNAL MEDICINE

## 2018-02-13 NOTE — PROGRESS NOTES
Deb Harrison  1955  [phone]      02/13/2018    [facility] CARDIOLOGY     Minerva Bucio MD  [address] [place] [address] DR THERESA RODRIGUEZ [id number]    Chief Complaint   Patient presents with   • Atrial Fibrillation   • Slow Heart Rate       Problem List:  Problem List:      Atrial Flutter      a. EP study, November 2003, at an outside hospital with no RFA performed.   b. Sotalol initiated, November 2003.   c. Recurrent, persistent atrial flutter with rapid ventricular rate and 1-to-1 AV conduction at 240 BPM.   d. EP study, 04/22/2004, with radiofrequency ablation of atrial flutter around the ASD patch, radiofrequency ablation of isthmus dependent counter clockwise atrial flutter, radiofrequency ablation of right atrial incisional scar flutter and a fourth atrial flutter which was possibly left atrial in origin and was not mapped nor ablated.   e. Recurrent atrial flutter, June 2004.   f. Repeat EP study with successful radiofrequency ablation of a right atrial scar related flutter along the anterolateral wall. Sinus node dysfunction was also demonstrated during that study while she was on both on IV Diltiazem and Digoxin.   g. Echocardiogram, 10/03/2007, with EF 55% to 60%, severe TR, mitral valve area of 1.8 cm² consistent with mild mitral stenosis, LA 4.5.  h. Implantation of a dual chamber permanent pacemaker, 02/25/2008 (Medtronic device).  i. EP study with radiofrequency ablation of atrial tachycardia from the high anterolateral atrial wall, 07/31/2008 with conduction of left atrial flutter, not mapped or ablated.   j. Rythmol increased, 07/31/2008.  k. Hospitalization, 11/13/2014 at [facility] for recurrent atrial tachycardia/flutter treated with cardioversion. Continuation of Norvasc and titration of diltiazem as well as addition of Eliquis therapy.  l. Readmission to [facility] in [place], [place] emergency room secondary to palpitations and syncope  on 12/24/2014 with device interrogation suggesting possible SVT versus ventricular tachycardia.   m. EPS study with RFA for atrial tachycardia, 01/16/2015.   n. Tikosyn initiated 01/16/2015  o. RFA AV Node 4/10/16 and RFA of AT and RA scar flutter   Tr (Tricuspid Regurgitation)      p. DELIA, 04/20/2004 with moderate tricuspid regurgitation, mild holosystolic mitral valve prolapse, marked right atrial enlargement at approximately 7-8 cm, normal LV size and function with LVEF (72%), right enlarged ventricular size and diminished systolic function.   q. Echocardiogram, 02/01/2006: EF (70%), moderately dilated RV, mild TR, mild pulmonary hypertension.   r. Echocardiogram in October 2007: Mild MS, moderate RV enlargement, moderate to severe tricuspid regurgitation, normal LV size and function, RVSP of 45 mmHg.  s. Right heart cath, 08/01/2008 with pulmonary artery pressure of 48/22, RV 53/20, cardiac output 5, cardiac index 2/8, wedge 26.   t. Echocardiogram, April 2009: LVEF (50% to 55%), severe right ventricular enlargement, moderate to severe tricuspid insufficiency, mild to moderate MR, RVSP estimated to be 40-50 mmHg.   u. Echocardiogram, 02/23/2012, with ejection fraction of 50% to 55%, LAE at 4 cm, mild MS with mild restriction of the mitral valve leaflet, prolapse of the posterior leaflet, mean gradient of 3.5 mmHg, mild TR with an RVSP of 31.  v. Echocardiogram, 04/23/2014, revealing EF 45% to 50% with diastolic dysfunction, mild MR, mild TR. RVSP 42.   w. Echo 2/29/16: LVEF NL, mild TR, mild -mod MR, RV severely dilated   Asd (Atrial Septal Defect)      ASD repair at age of 6      AVB       PM generator change out 3/2/17     Atrial Tachycardia (Resolved)      1. Initiation of Tikosyn on 01/16/2015.   2. Beta blocker added in place of diltiazem.   3. Hospital admission on 04/10/2016.   4. AV node ablation on 04/11/2016         Allergies  Allergies   Allergen Reactions   • Adenosine Nausea Only   • Codeine Nausea  Only and Dizziness       Current Medications    Current Outpatient Prescriptions:   •  Albuterol Sulfate (VENTOLIN HFA IN), Inhale., Disp: , Rfl:   •  dofetilide (TIKOSYN) 500 MCG capsule, TAKE 1 CAPSULE TWICE A DAY, Disp: 180 capsule, Rfl: 2  •  ELIQUIS 5 MG tablet tablet, TAKE 1 TABLET TWICE A DAY, Disp: 180 tablet, Rfl: 3  •  furosemide (LASIX) 40 MG tablet, TAKE 1 TABLET DAILY, Disp: 90 tablet, Rfl: 3  •  levothyroxine (SYNTHROID, LEVOTHROID) 25 MCG tablet, Take 75 mcg by mouth Daily., Disp: , Rfl: 5  •  metoprolol tartrate (LOPRESSOR) 25 MG tablet, TAKE 1 TABLET DAILY, Disp: 90 tablet, Rfl: 2  •  polyethylene glycol (MIRALAX) packet, Take 17 g by mouth Daily., Disp: , Rfl:   •  rosuvastatin (CRESTOR) 20 MG tablet, Take 20 mg by mouth daily., Disp: , Rfl:   •  spironolactone (ALDACTONE) 50 MG tablet, TAKE 1 TABLET DAILY, Disp: 90 tablet, Rfl: 3  •  temazepam (RESTORIL) 30 MG capsule, Take 30 mg by mouth at night as needed for sleep., Disp: , Rfl:     History of Present Illness   HPI    Pt presents for follow up of AF/AFL/AVB/CHD .Since the pt has seen us in clinic last, she was seen in the emergency room here at the [facility]  on 2 occasions in December.  Both times, there was a concern for bronchitis and pneumonia.  She received 2 courses of steroids as well as antibiotics.  She had significant amount of coughing that did result in multiple rib fractures as well.  Ultimately over the past 2-3 weeks, she no longer is having significant coughing or bronchitis type symptoms.  She does remain easily fatigued and weakened from both episodes.  In addition she has noted increasing dyspnea on exertion.  She has seen a pulmonary doctor here in [place] who apparently is going to order CT scan of her chest for further assessment.  She denies any near-syncope or syncope,  CP, LH, and dizziness. Denies any, bleeding, or TIA/CVA symptoms. Overall feels tired.  She has noted some short-lived palpitations but denies any  "sustained episodes ablation or atrial arrhythmias.    ROS:  General:  + fatigue,  No weight gain or loss  Cardiovascular:  Denies CP, PND, syncope, near syncope, edema + short lived palpitations.  Pulmonary:  + BUTT, No cough, or wheezing    Vitals:    02/13/18 1055   BP: 100/70   BP Location: Right arm   Patient Position: Sitting   Pulse: 79   Weight: 77.1 kg (170 lb)   Height: 162.6 cm (64\")       PE:  General: NAD  Neck: no JVD, no carotid bruits, no TM  Heart RRR, NL S1, Loud S2, S4 present, TR and MR murmurs present   Lungs: CTA, no wheezes, rhonchi, or rales  Abd: soft, non-tender, NL BS  Ext: No musculoskeletal deformities, no edema, cyanosis, or clubbing  Psych: normal mood and affect    Diagnostic Data:  Procedures      1. BUTT (dyspnea on exertion)    2. Atypical atrial flutter    3. Atrial tachycardia    4. CHB (complete heart block)    5. VHD (valvular heart disease)    6. ASD (atrial septal defect)        PM Interrogation: NL PM fxn, Nl battery fxn, Less than 0.6% atrial arrhythmias noted.  100% RV paced.  Battery voltage is nominal     Plan:  1) increasing shortness of breath on exertion in a patient with Located medical history including congenital heart disease, significant tricuspid insufficiency, and mitral insufficiency.  We'll arrange for her to undergo repeat echocardiogram in our office in the upcoming 1-2 weeks for reassessment of her valvular heart disease, etc.    2) AF/AFL/AT s/p RFA and AVN ablation: Well controlled at this time on Tikosyn.  3) AVB:   Continue present medications. NL pacemaker function.  4) Anticoagulation CHADVASC 2  Continue NOAC        F/up in 6 months      "

## 2018-02-14 DIAGNOSIS — R06.09 DYSPNEA ON EXERTION: Primary | ICD-10-CM

## 2018-03-01 ENCOUNTER — HOSPITAL ENCOUNTER (OUTPATIENT)
Dept: CARDIOLOGY | Facility: HOSPITAL | Age: 63
Discharge: HOME | End: 2018-03-01
Attending: INTERNAL MEDICINE | Admitting: INTERNAL MEDICINE

## 2018-03-01 VITALS — WEIGHT: 170 LBS | HEIGHT: 64 IN | BODY MASS INDEX: 29.02 KG/M2

## 2018-03-01 DIAGNOSIS — R06.09 DYSPNEA ON EXERTION: ICD-10-CM

## 2018-03-01 LAB
BH CV ECHO MEAS - AO ROOT AREA (BSA CORRECTED): 1.8
BH CV ECHO MEAS - AO ROOT AREA: 8.3 CM^2
BH CV ECHO MEAS - AO ROOT DIAM: 3.3 CM
BH CV ECHO MEAS - BSA(HAYCOCK): 1.9 M^2
BH CV ECHO MEAS - BSA: 1.8 M^2
BH CV ECHO MEAS - BZI_BMI: 29.2 KILOGRAMS/M^2
BH CV ECHO MEAS - BZI_METRIC_HEIGHT: 162.6 CM
BH CV ECHO MEAS - BZI_METRIC_WEIGHT: 77.1 KG
BH CV ECHO MEAS - CONTRAST EF (2CH): 67.9 ML/M^2
BH CV ECHO MEAS - CONTRAST EF 4CH: 50.9 ML/M^2
BH CV ECHO MEAS - EDV(CUBED): 28.3 ML
BH CV ECHO MEAS - EDV(MOD-SP2): 56 ML
BH CV ECHO MEAS - EDV(MOD-SP4): 57 ML
BH CV ECHO MEAS - EDV(TEICH): 36.3 ML
BH CV ECHO MEAS - EF(CUBED): 71.6 %
BH CV ECHO MEAS - EF(MOD-SP2): 67.9 %
BH CV ECHO MEAS - EF(TEICH): 64.9 %
BH CV ECHO MEAS - ESV(CUBED): 8 ML
BH CV ECHO MEAS - ESV(MOD-SP2): 18 ML
BH CV ECHO MEAS - ESV(MOD-SP4): 28 ML
BH CV ECHO MEAS - ESV(TEICH): 12.8 ML
BH CV ECHO MEAS - FS: 34.3 %
BH CV ECHO MEAS - IVS/LVPW: 0.74
BH CV ECHO MEAS - IVSD: 0.84 CM
BH CV ECHO MEAS - LA DIMENSION: 5.1 CM
BH CV ECHO MEAS - LA/AO: 1.6
BH CV ECHO MEAS - LAT PEAK E' VEL: 15.4 CM/SEC
BH CV ECHO MEAS - LV DIASTOLIC VOL/BSA (35-75): 31.2 ML/M^2
BH CV ECHO MEAS - LV MASS(C)D: 82.5 GRAMS
BH CV ECHO MEAS - LV MASS(C)DI: 45.2 GRAMS/M^2
BH CV ECHO MEAS - LV MAX PG: 6.9 MMHG
BH CV ECHO MEAS - LV MEAN PG: 3.7 MMHG
BH CV ECHO MEAS - LV SYSTOLIC VOL/BSA (12-30): 15.3 ML/M^2
BH CV ECHO MEAS - LV V1 MAX: 131.4 CM/SEC
BH CV ECHO MEAS - LV V1 MEAN: 89.6 CM/SEC
BH CV ECHO MEAS - LV V1 VTI: 25.6 CM
BH CV ECHO MEAS - LVIDD: 3 CM
BH CV ECHO MEAS - LVIDS: 2 CM
BH CV ECHO MEAS - LVLD AP2: 5.6 CM
BH CV ECHO MEAS - LVLD AP4: 7 CM
BH CV ECHO MEAS - LVLS AP2: 4.9 CM
BH CV ECHO MEAS - LVLS AP4: 6.3 CM
BH CV ECHO MEAS - LVOT AREA (M): 3.1 CM^2
BH CV ECHO MEAS - LVOT AREA: 3.1 CM^2
BH CV ECHO MEAS - LVOT DIAM: 2 CM
BH CV ECHO MEAS - LVPWD: 1.1 CM
BH CV ECHO MEAS - MED PEAK E' VEL: 10.4 CM/SEC
BH CV ECHO MEAS - MV E MAX VEL: 195.9 CM/SEC
BH CV ECHO MEAS - MV MAX PG: 13.3 MMHG
BH CV ECHO MEAS - MV MEAN PG: 5.2 MMHG
BH CV ECHO MEAS - MV V2 MAX: 182 CM/SEC
BH CV ECHO MEAS - MV V2 MEAN: 103.5 CM/SEC
BH CV ECHO MEAS - MV V2 VTI: 28.4 CM
BH CV ECHO MEAS - MVA(VTI): 2.8 CM^2
BH CV ECHO MEAS - PI END-D VEL: 137 CM/SEC
BH CV ECHO MEAS - RAP SYSTOLE: 8 MMHG
BH CV ECHO MEAS - RVDD: 3.3 CM
BH CV ECHO MEAS - RVSP: 47 MMHG
BH CV ECHO MEAS - SI(CUBED): 11.1 ML/M^2
BH CV ECHO MEAS - SI(LVOT): 43.3 ML/M^2
BH CV ECHO MEAS - SI(MOD-SP2): 20.8 ML/M^2
BH CV ECHO MEAS - SI(MOD-SP4): 15.9 ML/M^2
BH CV ECHO MEAS - SI(TEICH): 12.9 ML/M^2
BH CV ECHO MEAS - SV(CUBED): 20.2 ML
BH CV ECHO MEAS - SV(LVOT): 79 ML
BH CV ECHO MEAS - SV(MOD-SP2): 38 ML
BH CV ECHO MEAS - SV(MOD-SP4): 29 ML
BH CV ECHO MEAS - SV(TEICH): 23.6 ML
BH CV ECHO MEAS - TR MAX VEL: 308.5 CM/SEC
BH CV VAS BP LEFT ARM: NORMAL MMHG
E/E' RATIO: 15
LEFT ATRIUM VOLUME INDEX: 38.3 ML/M2
MAXIMAL PREDICTED HEART RATE: 156 BPM
STRESS TARGET HR: 133 BPM

## 2018-03-01 PROCEDURE — 93306 TTE W/DOPPLER COMPLETE: CPT | Performed by: INTERNAL MEDICINE

## 2018-03-01 PROCEDURE — 93306 TTE W/DOPPLER COMPLETE: CPT

## 2018-03-05 ENCOUNTER — TELEPHONE (OUTPATIENT)
Dept: CARDIOLOGY | Facility: CLINIC | Age: 63
End: 2018-03-05

## 2018-03-05 DIAGNOSIS — R93.1 ABNORMAL ECHOCARDIOGRAM: ICD-10-CM

## 2018-03-05 DIAGNOSIS — I34.0 MITRAL VALVE INSUFFICIENCY, UNSPECIFIED ETIOLOGY: Primary | ICD-10-CM

## 2018-03-12 ENCOUNTER — TELEPHONE (OUTPATIENT)
Dept: CARDIOLOGY | Facility: CLINIC | Age: 63
End: 2018-03-12

## 2018-03-12 NOTE — TELEPHONE ENCOUNTER
I called and spoke with the patient. She just wanted to know when her DELIA was scheduled. I transferred her to [facility] in scheduling.                ----- Message from Deb Harrison sent at 3/10/2018  3:36 PM EST -----  Regarding: Test Results Question  Contact: [phone]  I have a question about SCANNED - CARDIOLOGY resulted on 10/4/17.    How do I get to the website to view the results for this test?

## 2018-03-21 ENCOUNTER — PREP FOR SURGERY (OUTPATIENT)
Dept: OTHER | Facility: HOSPITAL | Age: 63
End: 2018-03-21

## 2018-03-22 ENCOUNTER — HOSPITAL ENCOUNTER (OUTPATIENT)
Dept: CARDIOLOGY | Facility: HOSPITAL | Age: 63
Discharge: HOME | End: 2018-03-22
Attending: INTERNAL MEDICINE | Admitting: INTERNAL MEDICINE

## 2018-03-22 VITALS
RESPIRATION RATE: 12 BRPM | OXYGEN SATURATION: 92 % | SYSTOLIC BLOOD PRESSURE: 113 MMHG | DIASTOLIC BLOOD PRESSURE: 49 MMHG | HEART RATE: 71 BPM

## 2018-03-22 DIAGNOSIS — I34.0 MITRAL VALVE INSUFFICIENCY, UNSPECIFIED ETIOLOGY: ICD-10-CM

## 2018-03-22 DIAGNOSIS — R93.1 ABNORMAL ECHOCARDIOGRAM: ICD-10-CM

## 2018-03-22 PROBLEM — Z45.018 ELECTIVE REPLACEMENT INDICATED FOR PACEMAKER: Status: RESOLVED | Noted: 2017-03-02 | Resolved: 2018-03-22

## 2018-03-22 LAB
BH CV ECHO MEAS - BSA(HAYCOCK): 1.9 M^2
BH CV ECHO MEAS - BSA: 1.8 M^2
BH CV ECHO MEAS - BZI_BMI: 29.2 KILOGRAMS/M^2
BH CV ECHO MEAS - BZI_METRIC_HEIGHT: 162.6 CM
BH CV ECHO MEAS - BZI_METRIC_WEIGHT: 77.1 KG
BH CV ECHO MEAS - MV MAX PG: 6.5 MMHG
BH CV ECHO MEAS - MV MEAN PG: 3.6 MMHG
BH CV ECHO MEAS - MV V2 MAX: 127.6 CM/SEC
BH CV ECHO MEAS - MV V2 MEAN: 90.2 CM/SEC
BH CV ECHO MEAS - MV V2 VTI: 28.4 CM
BH CV VAS BP LEFT ARM: NORMAL MMHG

## 2018-03-22 PROCEDURE — 93321 DOPPLER ECHO F-UP/LMTD STD: CPT

## 2018-03-22 PROCEDURE — 93312 ECHO TRANSESOPHAGEAL: CPT | Performed by: INTERNAL MEDICINE

## 2018-03-22 PROCEDURE — 93325 DOPPLER ECHO COLOR FLOW MAPG: CPT

## 2018-03-22 PROCEDURE — 25010000002 MIDAZOLAM PER 1 MG: Performed by: INTERNAL MEDICINE

## 2018-03-22 PROCEDURE — 93325 DOPPLER ECHO COLOR FLOW MAPG: CPT | Performed by: INTERNAL MEDICINE

## 2018-03-22 PROCEDURE — 93312 ECHO TRANSESOPHAGEAL: CPT

## 2018-03-22 PROCEDURE — S0260 H&P FOR SURGERY: HCPCS | Performed by: NURSE PRACTITIONER

## 2018-03-22 PROCEDURE — 93321 DOPPLER ECHO F-UP/LMTD STD: CPT | Performed by: INTERNAL MEDICINE

## 2018-03-22 RX ORDER — MIDAZOLAM HYDROCHLORIDE 1 MG/ML
INJECTION INTRAMUSCULAR; INTRAVENOUS
Status: COMPLETED | OUTPATIENT
Start: 2018-03-22 | End: 2018-03-22

## 2018-03-22 RX ADMIN — METHOHEXITAL SODIUM 20 MG: 500 INJECTION, POWDER, LYOPHILIZED, FOR SOLUTION INTRAMUSCULAR; INTRAVENOUS; RECTAL at 10:14

## 2018-03-22 RX ADMIN — MIDAZOLAM HYDROCHLORIDE 2 MG: 1 INJECTION, SOLUTION INTRAMUSCULAR; INTRAVENOUS at 10:11

## 2018-03-22 RX ADMIN — METHOHEXITAL SODIUM 20 MG: 500 INJECTION, POWDER, LYOPHILIZED, FOR SOLUTION INTRAMUSCULAR; INTRAVENOUS; RECTAL at 10:21

## 2018-03-22 RX ADMIN — METHOHEXITAL SODIUM 20 MG: 500 INJECTION, POWDER, LYOPHILIZED, FOR SOLUTION INTRAMUSCULAR; INTRAVENOUS; RECTAL at 10:11

## 2018-03-22 NOTE — H&P
Deb Harrison  [id number]  1955   LOS: 0 days   Patient Care Team:  PHYSICIAN: Minerva Bucio MD   ELECTROPHYSIOLOGIST: Maunel Tavarez MD, [facility]  PULMONOLOGIST: Shannan De Los Santos DO    Chief Complaint:  BUTT    Problem List:  1. Atrial flutter  A. EP study, November 2003, at an outside hospital with no RFA performed.   B. Sotalol initiated, November 2003.   C. Recurrent, persistent atrial flutter with rapid ventricular rate and 1-to-1 AV conduction at 240 BPM.   D. EP study, 04/22/2004, with radiofrequency ablation of atrial flutter around the ASD patch, radiofrequency ablation of isthmus dependent counter clockwise atrial flutter, radiofrequency ablation of right atrial incisional scar flutter and a fourth atrial flutter which was possibly left atrial in origin and was not mapped nor ablated.   E. Recurrent atrial flutter, June 2004.   F. Repeat EP study with successful radiofrequency ablation of a right atrial scar related flutter along the anterolateral wall. Sinus node dysfunction was also demonstrated during that study while she was on both on IV Diltiazem and Digoxin.   G. Echocardiogram, 10/03/2007, with EF 55% to 60%, severe TR, mitral valve area of 1.8 cm² consistent with mild mitral stenosis, LA 4.5.  H. Implantation of a dual chamber permanent pacemaker, 02/25/2008 (Medtronic device).  I. EP study with radiofrequency ablation of atrial tachycardia from the high anterolateral atrial wall, 07/31/2008 with conduction of left atrial flutter, not mapped or ablated.   J. Rythmol increased, 07/31/2008.  K. Hospitalization, 11/13/2014 at [facility] for recurrent atrial tachycardia/flutter treated with cardioversion. Continuation of Norvasc and titration of diltiazem as well as addition of Eliquis therapy.  L. Readmission to [facility] in [place], [place] emergency room secondary to palpitations and syncope on 12/24/2014 with device interrogation suggesting possible SVT  versus ventricular tachycardia.  M. EPS study with RFA for atrial tachycardia, 01/16/2015.   N. Tikosyn initiated 01/16/2015  O. RFA AV Node 4/10/16 and RFA of AT and RA scar flutter  2. Tricuspid regurgitation  A. DELIA, 04/20/2004 with moderate tricuspid regurgitation, mild holosystolic mitral valve prolapse, marked right atrial enlargement at approximately 7-8 cm, normal LV size and function with LVEF (72%), right enlarged ventricular size and diminished systolic function.   B. Echocardiogram, 02/01/2006: EF (70%), moderately dilated RV, mild TR, mild pulmonary hypertension.   C. Echocardiogram in October 2007: Mild MS, moderate RV enlargement, moderate to severe tricuspid regurgitation, normal LV size and function, RVSP of 45 mmHg.  D. Right heart cath, 08/01/2008 with pulmonary artery pressure of 48/22, RV 53/20, cardiac output 5, cardiac index 2/8, wedge 26.   E. Echocardiogram, April 2009: LVEF (50% to 55%), severe right ventricular enlargement, moderate to severe tricuspid insufficiency, mild to moderate MR, RVSP estimated to be 40-50 mmHg.   F. Echocardiogram, 02/23/2012, with ejection fraction of 50% to 55%, LAE at 4 cm, mild MS with mild restriction of the mitral valve leaflet, prolapse of the posterior leaflet, mean gradient of 3.5 mmHg, mild TR with an RVSP of 31.  G. Echocardiogram, 04/23/2014, revealing EF 45% to 50% with diastolic dysfunction, mild MR, mild TR. RVSP 42.   H. Echo 2/29/16: LVEF NL, mild TR, mild -mod MR, RV severely dilated  3. ASD repair at age 6  4. Hypertension  5. Hyperlipidemia  6. Hypothyroidism    Allergies   Allergen Reactions   • Adenosine Nausea Only   • Codeine Nausea Only and Dizziness       (Not in a hospital admission)  Scheduled Meds:  Continuous Infusions:  No current facility-administered medications for this encounter.   PRN Meds:.       History of Present Illness:    Per Dr. Tavarez's office note 2/13/18, Pt presents for follow up of AF/AFL/AVB/CHD .Since the pt has  "seen us in clinic last, she was seen in the emergency room here at the [facility]  on 2 occasions in December.  Both times, there was a concern for bronchitis and pneumonia.  She received 2 courses of steroids as well as antibiotics.  She had significant amount of coughing that did result in multiple rib fractures as well.  Ultimately over the past 2-3 weeks, she no longer is having significant coughing or bronchitis type symptoms.  She does remain easily fatigued and weakened from both episodes.  In addition she has noted increasing dyspnea on exertion.  She has seen a pulmonary doctor here in [place] who apparently is going to order CT scan of her chest for further assessment.  She denies any near-syncope or syncope,  CP, LH, and dizziness. Denies any, bleeding, or TIA/CVA symptoms. Overall feels tired.  She has noted some short-lived palpitations but denies any sustained episodes ablation or atrial arrhythmias.   Today she denies chest pain, SOB, palpitations, presyncope, syncope, or edema. She will have a DELIA to evaluate her valvular function. She recently had PFT's and walking oximetry? performed at her pulmonologist in [place], [place]. She is supposed to follow up with her next month and is unsure of her test results. She had a CT for cancer screening and was told there were \"abnormalities\" on it. She cannot walk on flat surfaces or go up stairs without getting winded.      Cardiac risk factors: advanced age (older than 55 for men, 65 for women), dyslipidemia, hypertension and obesity (BMI >= 30 kg/m2).    Social History     Social History   • Marital status:      Spouse name: N/A   • Number of children: N/A   • Years of education: N/A     Occupational History   • Not on file.     Social History Main Topics   • Smoking status: Former Smoker     Packs/day: 1.00     Years: 30.00     Types: Cigarettes     Quit date: 2008   • Smokeless tobacco: Never Used      Comment: quit in 2008   • Alcohol use " No   • Drug use: No   • Sexual activity: Defer     Other Topics Concern   • Not on file     Social History Narrative   • No narrative on file     No family history on file.    Review of Systems  Pertinent items are noted in HPI and problem list.     Objective:       Physical Exam  LMP  (LMP Unknown)   There were no vitals filed for this visit.  There is no height or weight on file to calculate BMI.  No intake or output data in the 24 hours ending 03/22/18 0931  bp 104/58, HR 71, O2 94% on room air, RR 16  General Appearance:  Alert, cooperative, no distress, appears stated age   Head:  Normocephalic, without obvious abnormality, atraumatic   Neck: Supple, symmetrical, trachea midline, no adenopathy, thyroid: not enlarged, symmetric, no tenderness/mass/nodules, no carotid bruit or JVD   Lungs:   Clear to auscultation bilaterally, respirations unlabored   Heart:  Regular rate and rhythm, S1, S2 normal, grade 2/6 murmur, rub or gallop   Abdomen:   Soft, non-tender, no masses, no organomegaly, bowel sounds audible x4   Extremities: No edema, normal range of motion   Pulses: 2+ and symmetric   Skin: Skin color, texture, turgor normal, no rashes or lesions   Neurologic: Normal       Cardiographics  EKG:No new ECG to review    Imaging  Chest x-ray:None to review    Lab Review : none to review                     PM Interrogation 2/13/18: NL PM fxn, Nl battery fxn, Less than 0.6% atrial arrhythmias noted.  100% RV paced.  Battery voltage is nominal          Assessment:       Patient with complaints of increasing shortness of breath on exertion with past medical history including congenital heart disease, significant tricuspid insufficiency, and mitral insufficiency. Atrial fib and flutter controlled with Tikosyn, anticoagulated with Eliquis. DELIA will be performed to assess valvular function in view of her current dyspnea on exertion. Procedure, risks, complications discussed with patient and she is agreeable to proceed. She  is following up with her pulmonologist (Roger De Los Santos, ) next month because she just had a couple tests performed (PFT's, walking oximetry, CT scan for cancer screening?).       Plan:   1. DELIA  2. Follow up with Dr. Tavarez 8/14/18    Scribed for Gerardo Carrasco MD by NIRU Schofield. 3/22/2018  9:31 AM

## 2018-03-26 ENCOUNTER — TELEPHONE (OUTPATIENT)
Dept: CARDIOLOGY | Facility: CLINIC | Age: 63
End: 2018-03-26

## 2018-03-26 NOTE — TELEPHONE ENCOUNTER
----- Message from Manuel Tavarez MD sent at 3/26/2018  8:45 AM EDT -----  Please call pt and tell her that the degree of MR is only moderate. No further changes for now.          Patient notified and aware.

## 2018-04-20 ENCOUNTER — OFFICE VISIT CONVERTED (OUTPATIENT)
Dept: PULMONOLOGY | Facility: CLINIC | Age: 63
End: 2018-04-20
Attending: INTERNAL MEDICINE

## 2018-05-01 ENCOUNTER — OFFICE VISIT CONVERTED (OUTPATIENT)
Dept: FAMILY MEDICINE CLINIC | Facility: CLINIC | Age: 63
End: 2018-05-01
Attending: NURSE PRACTITIONER

## 2018-05-04 ENCOUNTER — CONVERSION ENCOUNTER (OUTPATIENT)
Dept: OTHER | Facility: HOSPITAL | Age: 63
End: 2018-05-04

## 2018-05-22 RX ORDER — DOFETILIDE 0.5 MG/1
CAPSULE ORAL
Qty: 180 CAPSULE | Refills: 2 | Status: SHIPPED | OUTPATIENT
Start: 2018-05-22 | End: 2019-02-18 | Stop reason: SDUPTHER

## 2018-05-23 ENCOUNTER — CLINICAL SUPPORT NO REQUIREMENTS (OUTPATIENT)
Dept: CARDIOLOGY | Facility: CLINIC | Age: 63
End: 2018-05-23

## 2018-05-23 ENCOUNTER — TELEPHONE (OUTPATIENT)
Dept: CARDIOLOGY | Facility: CLINIC | Age: 63
End: 2018-05-23

## 2018-05-23 DIAGNOSIS — I44.30 ATRIOVENTRICULAR BLOCK: ICD-10-CM

## 2018-05-23 DIAGNOSIS — I48.0 PAROXYSMAL ATRIAL FIBRILLATION (HCC): ICD-10-CM

## 2018-05-23 PROCEDURE — 93296 REM INTERROG EVL PM/IDS: CPT | Performed by: INTERNAL MEDICINE

## 2018-05-23 PROCEDURE — 93294 REM INTERROG EVL PM/LDLS PM: CPT | Performed by: INTERNAL MEDICINE

## 2018-05-23 NOTE — TELEPHONE ENCOUNTER
Called and spoke to pt reminding her it is time to send in a reading on her pacemaker.  Pt verbalized understanding.

## 2018-07-05 ENCOUNTER — OFFICE VISIT CONVERTED (OUTPATIENT)
Dept: FAMILY MEDICINE CLINIC | Facility: CLINIC | Age: 63
End: 2018-07-05
Attending: NURSE PRACTITIONER

## 2018-08-09 RX ORDER — FUROSEMIDE 40 MG/1
TABLET ORAL
Qty: 90 TABLET | Refills: 3 | Status: SHIPPED | OUTPATIENT
Start: 2018-08-09 | End: 2019-08-05 | Stop reason: SDUPTHER

## 2018-08-10 ENCOUNTER — OFFICE VISIT CONVERTED (OUTPATIENT)
Dept: FAMILY MEDICINE CLINIC | Facility: CLINIC | Age: 63
End: 2018-08-10
Attending: NURSE PRACTITIONER

## 2018-08-14 ENCOUNTER — OFFICE VISIT (OUTPATIENT)
Dept: CARDIOLOGY | Facility: CLINIC | Age: 63
End: 2018-08-14

## 2018-08-14 VITALS
DIASTOLIC BLOOD PRESSURE: 70 MMHG | HEART RATE: 74 BPM | OXYGEN SATURATION: 93 % | SYSTOLIC BLOOD PRESSURE: 110 MMHG | BODY MASS INDEX: 29.02 KG/M2 | WEIGHT: 170 LBS | HEIGHT: 64 IN

## 2018-08-14 DIAGNOSIS — R06.09 DOE (DYSPNEA ON EXERTION): Primary | ICD-10-CM

## 2018-08-14 DIAGNOSIS — I44.30 AV BLOCK: ICD-10-CM

## 2018-08-14 DIAGNOSIS — I38 VALVULAR HEART DISEASE: ICD-10-CM

## 2018-08-14 DIAGNOSIS — I47.1 ATRIAL TACHYCARDIA (HCC): ICD-10-CM

## 2018-08-14 DIAGNOSIS — R07.89 CHEST TIGHTNESS: ICD-10-CM

## 2018-08-14 DIAGNOSIS — I27.20 PULMONARY HTN (HCC): ICD-10-CM

## 2018-08-14 DIAGNOSIS — I48.4 ATYPICAL ATRIAL FLUTTER (HCC): ICD-10-CM

## 2018-08-14 PROCEDURE — 99214 OFFICE O/P EST MOD 30 MIN: CPT | Performed by: INTERNAL MEDICINE

## 2018-08-14 PROCEDURE — 93280 PM DEVICE PROGR EVAL DUAL: CPT | Performed by: INTERNAL MEDICINE

## 2018-08-14 NOTE — PROGRESS NOTES
Deb Harrison  1955    There is no work phone number on file.      08/14/2018    [facility] CARDIOLOGY     Minerva Bucio MD  [address] [place] [id number]    Chief Complaint   Patient presents with   • Atrial Fibrillation       Problem List:   Atrial Flutter      a. EP study, November 2003, at an outside hospital with no RFA performed.   b. Sotalol initiated, November 2003.   c. Recurrent, persistent atrial flutter with rapid ventricular rate and 1-to-1 AV conduction at 240 BPM.   d. EP study, 04/22/2004, with radiofrequency ablation of atrial flutter around the ASD patch, radiofrequency ablation of isthmus dependent counter clockwise atrial flutter, radiofrequency ablation of right atrial incisional scar flutter and a fourth atrial flutter which was possibly left atrial in origin and was not mapped nor ablated.   e. Recurrent atrial flutter, June 2004.   f. Repeat EP study with successful radiofrequency ablation of a right atrial scar related flutter along the anterolateral wall. Sinus node dysfunction was also demonstrated during that study while she was on both on IV Diltiazem and Digoxin.   g. Echocardiogram, 10/03/2007, with EF 55% to 60%, severe TR, mitral valve area of 1.8 cm² consistent with mild mitral stenosis, LA 4.5.  h. Implantation of a dual chamber permanent pacemaker, 02/25/2008 (Medtronic device).  i. EP study with radiofrequency ablation of atrial tachycardia from the high anterolateral atrial wall, 07/31/2008 with conduction of left atrial flutter, not mapped or ablated.   j. Rythmol increased, 07/31/2008.  k. Hospitalization, 11/13/2014 at [facility] for recurrent atrial tachycardia/flutter treated with cardioversion. Continuation of Norvasc and titration of diltiazem as well as addition of Eliquis therapy.  l. Readmission to [facility] in [place], [place] emergency room secondary to palpitations and syncope on  12/24/2014 with device interrogation suggesting possible SVT versus ventricular tachycardia.   m. EPS study with RFA for atrial tachycardia, 01/16/2015.   n. Tikosyn initiated 01/16/2015  o. RFA AV Node 4/10/16 and RFA of AT and RA scar flutter   Tr (Tricuspid Regurgitation)      p. DELIA, 04/20/2004 with moderate tricuspid regurgitation, mild holosystolic mitral valve prolapse, marked right atrial enlargement at approximately 7-8 cm, normal LV size and function with LVEF (72%), right enlarged ventricular size and diminished systolic function.   q. Echocardiogram, 02/01/2006: EF (70%), moderately dilated RV, mild TR, mild pulmonary hypertension.   r. Echocardiogram in October 2007: Mild MS, moderate RV enlargement, moderate to severe tricuspid regurgitation, normal LV size and function, RVSP of 45 mmHg.  s. Right heart cath, 08/01/2008 with pulmonary artery pressure of 48/22, RV 53/20, cardiac output 5, cardiac index 2/8, wedge 26.   t. Echocardiogram, April 2009: LVEF (50% to 55%), severe right ventricular enlargement, moderate to severe tricuspid insufficiency, mild to moderate MR, RVSP estimated to be 40-50 mmHg.   u. Echocardiogram, 02/23/2012, with ejection fraction of 50% to 55%, LAE at 4 cm, mild MS with mild restriction of the mitral valve leaflet, prolapse of the posterior leaflet, mean gradient of 3.5 mmHg, mild TR with an RVSP of 31.  v. Echocardiogram, 04/23/2014, revealing EF 45% to 50% with diastolic dysfunction, mild MR, mild TR. RVSP 42.   w. Echo 2/29/16: LVEF NL, mild TR, mild -mod MR, RV severely dilated  x. Echocardiogram 3/1/18: EF 66-70%, moderate TR  y. DELIA 3/22/18: NL LVEF, Moderate MR, Mod TR, severe RV enlargement,    Asd (Atrial Septal Defect)      ASD repair at age of 6      AVB       PM generator change out 3/2/17     Atrial Tachycardia (Resolved)      1. Initiation of Tikosyn on 01/16/2015.   2. Beta blocker added in place of diltiazem.   3. Hospital admission on 04/10/2016.   4. AV node  ablation on 04/11/2016         Allergies  Allergies   Allergen Reactions   • Adenosine Nausea Only   • Codeine Nausea Only and Dizziness       Current Medications    Current Outpatient Prescriptions:   •  Albuterol Sulfate (VENTOLIN HFA IN), Inhale., Disp: , Rfl:   •  dofetilide (TIKOSYN) 500 MCG capsule, TAKE 1 CAPSULE TWICE A DAY, Disp: 180 capsule, Rfl: 2  •  ELIQUIS 5 MG tablet tablet, TAKE 1 TABLET TWICE A DAY, Disp: 180 tablet, Rfl: 3  •  furosemide (LASIX) 40 MG tablet, TAKE 1 TABLET DAILY, Disp: 90 tablet, Rfl: 3  •  levothyroxine (SYNTHROID, LEVOTHROID) 25 MCG tablet, Take 75 mcg by mouth Daily., Disp: , Rfl: 5  •  metoprolol tartrate (LOPRESSOR) 25 MG tablet, TAKE 1 TABLET DAILY, Disp: 90 tablet, Rfl: 2  •  polyethylene glycol (MIRALAX) packet, Take 17 g by mouth Daily., Disp: , Rfl:   •  rosuvastatin (CRESTOR) 20 MG tablet, Take 20 mg by mouth daily., Disp: , Rfl:   •  spironolactone (ALDACTONE) 50 MG tablet, TAKE 1 TABLET DAILY, Disp: 90 tablet, Rfl: 3  •  temazepam (RESTORIL) 30 MG capsule, Take 30 mg by mouth at night as needed for sleep., Disp: , Rfl:     History of Present Illness   HPI    Pt presents for follow up of atrial flutter, bradycardia, and PM check. She notes only occasional palpitations, nothing sustained in nature.  She has continued to c/o dyspnea on exertion, feels it is somewhat stable, maybe a little worse.  Did have evaluation with pulmonology back in April but states she has not gotten any results from this.  She was told she had pulmonary HTN, interstitial pneumonitis, and right heart failure.  She states she has heard nothing further from their office in terms of any changes to her medications and was told that she had to have further evaluation through her cardiologist.  Since we last saw the pt, pt denies any sustained AF/AT episodes, LH, and dizziness. Denies any hospitalizations, ER visits, bleeding on Eliquis, or TIA/CVA symptoms. Overall feels ok. + chest tightness with  "minimal exertion    ROS:  General:  + fatigue, no weight gain or loss  Cardiovascular:  + CP, No PND, syncope, near syncope, edema or palpitations.  Pulmonary:  + BUTT, no cough, or wheezing      Vitals:    08/14/18 1014   BP: 110/70   BP Location: Right arm   Patient Position: Sitting   Pulse: 74   SpO2: 93%   Weight: 77.1 kg (170 lb)   Height: 162.6 cm (64\")     PE:  General: NAD  Neck: no JVD, no carotid bruits, no TM  Heart RRR, NL S1, S2, S4 present, no rubs, 4/6 MR murmur, RV heave noted  Lungs: CTA, no wheezes, rhonchi, or rales  Abd: soft, non-tender, NL BS  Ext: No musculoskeletal deformities, no edema, cyanosis, or clubbing  Psych: normal mood and affect    Diagnostic Data:        ECG 12 Lead  Date/Time: 8/14/2018 10:35 AM  Performed by: LISA FU  Authorized by: LISA FU   Comparison: compared with previous ECG from 4/10/2016  Comparison to previous ECG: Paced rhythm has replaced NSR  Rhythm: sinus rhythm and paced  BPM: 75          Device interrogation: Nml function, Nml battery, 3% mode switch    1. BUTT (dyspnea on exertion)    2. Chest tightness    3. Pulmonary HTN    4. Atypical atrial flutter (CMS/HCC)    5. Atrial tachycardia (CMS/HCC)    6. AV block    7. Valvular heart disease          Plan:  1) CP/progressive BUTT: may need right and left heart cath: need Pulm results first   2) Pulm HTN: see # 1  3) AF/AT:  - S/p AVN ablation, 3% mode switch, overall well controlled on Tikosyn  4) Anticoagulation  - Continue Eliquis  5) AVB:  - S/p AVN ablation, PPM, device with normal function  6) VHD:  - Moderate MR per DELIA 03/2018    F/up in 4 months    Scribed for Manuel Tavarez MD by Lisa Fu, APRN. 8/14/2018  11:32 AM     I, Manuel Tavarez MD, personally performed the services described in this documentation as scribed by the above named individual in my presence, and it is both accurate and complete.  8/14/2018  11:32 AM    Addendum: I received the results " of her pulmonary assessment today from April 2018.  Per her pulmonologist, the pulmonary function tests that were performed on 2/15/2018 showed moderate restrictive lung disease with reduced diffusion capacity.  CT scan of the chest on 3/16/2018 demonstrated chronic interstitial changes consistent with UIP, and large pulmonary arteries, and right atrial enlargement.  Atelectasis and scarring in the right middle lobe was noted as well as a left adrenal nodule.  She also underwent rheumatological workup testing which was pending..  It would appear that her pulmonary hypertension is most likely due to her interstitial lung disease. At this point, however, I would favor pursuing both right and left heart catheterization due to her significant shortness of breath with exertion.  During right heart catheterization, vasodilatory testing should be performed.      I, Manuel Tavarez MD, personally performed the services described in this documentation as scribed by the above named individual in my presence, and it is both accurate and complete.  8/20/2018  6:18 PM

## 2018-08-22 DIAGNOSIS — I27.20 PULMONARY HYPERTENSION (HCC): Primary | ICD-10-CM

## 2018-08-23 ENCOUNTER — PREP FOR SURGERY (OUTPATIENT)
Dept: OTHER | Facility: HOSPITAL | Age: 63
End: 2018-08-23

## 2018-08-23 DIAGNOSIS — R06.09 DYSPNEA ON EXERTION: Primary | ICD-10-CM

## 2018-08-23 RX ORDER — ASPIRIN 81 MG/1
324 TABLET, CHEWABLE ORAL ONCE
Status: CANCELLED | OUTPATIENT
Start: 2018-08-23 | End: 2018-08-23

## 2018-08-23 RX ORDER — ASPIRIN 81 MG/1
81 TABLET ORAL DAILY
Status: CANCELLED | OUTPATIENT
Start: 2018-08-24

## 2018-08-23 RX ORDER — SODIUM CHLORIDE 0.9 % (FLUSH) 0.9 %
1-10 SYRINGE (ML) INJECTION AS NEEDED
Status: CANCELLED | OUTPATIENT
Start: 2018-08-23

## 2018-08-23 RX ORDER — SODIUM CHLORIDE 9 MG/ML
3 INJECTION, SOLUTION INTRAVENOUS CONTINUOUS
Status: CANCELLED | OUTPATIENT
Start: 2018-08-23 | End: 2018-08-23

## 2018-08-28 PROBLEM — J84.9 INTERSTITIAL LUNG DISEASE (HCC): Status: ACTIVE | Noted: 2018-08-28

## 2018-08-29 ENCOUNTER — HOSPITAL ENCOUNTER (OUTPATIENT)
Facility: HOSPITAL | Age: 63
Discharge: HOME | End: 2018-08-29
Attending: INTERNAL MEDICINE | Admitting: INTERNAL MEDICINE

## 2018-08-29 VITALS
BODY MASS INDEX: 31.05 KG/M2 | WEIGHT: 181.88 LBS | TEMPERATURE: 98.1 F | RESPIRATION RATE: 16 BRPM | DIASTOLIC BLOOD PRESSURE: 61 MMHG | HEIGHT: 64 IN | SYSTOLIC BLOOD PRESSURE: 101 MMHG | OXYGEN SATURATION: 98 % | HEART RATE: 72 BPM

## 2018-08-29 DIAGNOSIS — I27.20 PULMONARY HYPERTENSION (HCC): ICD-10-CM

## 2018-08-29 DIAGNOSIS — R06.09 DYSPNEA ON EXERTION: ICD-10-CM

## 2018-08-29 LAB
ANION GAP SERPL CALCULATED.3IONS-SCNC: 7 MMOL/L (ref 3–11)
BUN BLD-MCNC: 15 MG/DL (ref 9–23)
BUN/CREAT SERPL: 19 (ref 7–25)
CALCIUM SPEC-SCNC: 9.4 MG/DL (ref 8.7–10.4)
CHLORIDE SERPL-SCNC: 104 MMOL/L (ref 99–109)
CO2 SERPL-SCNC: 27 MMOL/L (ref 20–31)
CREAT BLD-MCNC: 0.79 MG/DL (ref 0.6–1.3)
DEPRECATED RDW RBC AUTO: 46.1 FL (ref 37–54)
ERYTHROCYTE [DISTWIDTH] IN BLOOD BY AUTOMATED COUNT: 13.7 % (ref 11.3–14.5)
GFR SERPL CREATININE-BSD FRML MDRD: 73 ML/MIN/1.73
GLUCOSE BLD-MCNC: 106 MG/DL (ref 70–100)
HCT VFR BLD AUTO: 43.2 % (ref 34.5–44)
HGB BLD-MCNC: 14.2 G/DL (ref 11.5–15.5)
MCH RBC QN AUTO: 30.1 PG (ref 27–31)
MCHC RBC AUTO-ENTMCNC: 32.9 G/DL (ref 32–36)
MCV RBC AUTO: 91.5 FL (ref 80–99)
PLATELET # BLD AUTO: 168 10*3/MM3 (ref 150–450)
PMV BLD AUTO: 9.9 FL (ref 6–12)
POTASSIUM BLD-SCNC: 3.8 MMOL/L (ref 3.5–5.5)
RBC # BLD AUTO: 4.72 10*6/MM3 (ref 3.89–5.14)
SODIUM BLD-SCNC: 138 MMOL/L (ref 132–146)
WBC NRBC COR # BLD: 8.25 10*3/MM3 (ref 3.5–10.8)

## 2018-08-29 PROCEDURE — C1769 GUIDE WIRE: HCPCS | Performed by: INTERNAL MEDICINE

## 2018-08-29 PROCEDURE — 36415 COLL VENOUS BLD VENIPUNCTURE: CPT

## 2018-08-29 PROCEDURE — 0 IOPAMIDOL PER 1 ML: Performed by: INTERNAL MEDICINE

## 2018-08-29 PROCEDURE — C1894 INTRO/SHEATH, NON-LASER: HCPCS | Performed by: INTERNAL MEDICINE

## 2018-08-29 PROCEDURE — 93460 R&L HRT ART/VENTRICLE ANGIO: CPT | Performed by: INTERNAL MEDICINE

## 2018-08-29 PROCEDURE — C1751 CATH, INF, PER/CENT/MIDLINE: HCPCS | Performed by: INTERNAL MEDICINE

## 2018-08-29 PROCEDURE — 25010000002 FENTANYL CITRATE (PF) 100 MCG/2ML SOLUTION: Performed by: INTERNAL MEDICINE

## 2018-08-29 PROCEDURE — 85027 COMPLETE CBC AUTOMATED: CPT | Performed by: INTERNAL MEDICINE

## 2018-08-29 PROCEDURE — 80048 BASIC METABOLIC PNL TOTAL CA: CPT | Performed by: INTERNAL MEDICINE

## 2018-08-29 PROCEDURE — 25010000002 MIDAZOLAM PER 1 MG: Performed by: INTERNAL MEDICINE

## 2018-08-29 RX ORDER — LIDOCAINE HYDROCHLORIDE 10 MG/ML
INJECTION, SOLUTION EPIDURAL; INFILTRATION; INTRACAUDAL; PERINEURAL AS NEEDED
Status: DISCONTINUED | OUTPATIENT
Start: 2018-08-29 | End: 2018-08-29 | Stop reason: HOSPADM

## 2018-08-29 RX ORDER — SODIUM CHLORIDE 9 MG/ML
250 INJECTION, SOLUTION INTRAVENOUS ONCE AS NEEDED
Status: DISCONTINUED | OUTPATIENT
Start: 2018-08-29 | End: 2018-08-29 | Stop reason: HOSPADM

## 2018-08-29 RX ORDER — ASPIRIN 81 MG/1
324 TABLET, CHEWABLE ORAL ONCE
Status: COMPLETED | OUTPATIENT
Start: 2018-08-29 | End: 2018-08-29

## 2018-08-29 RX ORDER — MIDAZOLAM HYDROCHLORIDE 1 MG/ML
INJECTION INTRAMUSCULAR; INTRAVENOUS AS NEEDED
Status: DISCONTINUED | OUTPATIENT
Start: 2018-08-29 | End: 2018-08-29 | Stop reason: HOSPADM

## 2018-08-29 RX ORDER — SODIUM CHLORIDE 9 MG/ML
3 INJECTION, SOLUTION INTRAVENOUS CONTINUOUS
Status: ACTIVE | OUTPATIENT
Start: 2018-08-29 | End: 2018-08-29

## 2018-08-29 RX ORDER — SODIUM CHLORIDE 0.9 % (FLUSH) 0.9 %
1-10 SYRINGE (ML) INJECTION AS NEEDED
Status: DISCONTINUED | OUTPATIENT
Start: 2018-08-29 | End: 2018-08-29 | Stop reason: HOSPADM

## 2018-08-29 RX ORDER — ASPIRIN 81 MG/1
81 TABLET ORAL DAILY
Status: DISCONTINUED | OUTPATIENT
Start: 2018-08-30 | End: 2018-08-29 | Stop reason: HOSPADM

## 2018-08-29 RX ORDER — FENTANYL CITRATE 50 UG/ML
INJECTION, SOLUTION INTRAMUSCULAR; INTRAVENOUS AS NEEDED
Status: DISCONTINUED | OUTPATIENT
Start: 2018-08-29 | End: 2018-08-29 | Stop reason: HOSPADM

## 2018-08-29 RX ADMIN — SODIUM CHLORIDE 3 ML/KG/HR: 9 INJECTION, SOLUTION INTRAVENOUS at 07:29

## 2018-08-29 RX ADMIN — ASPIRIN 81 MG 324 MG: 81 TABLET ORAL at 07:29

## 2018-09-24 ENCOUNTER — OFFICE VISIT CONVERTED (OUTPATIENT)
Dept: FAMILY MEDICINE CLINIC | Facility: CLINIC | Age: 63
End: 2018-09-24
Attending: NURSE PRACTITIONER

## 2018-10-24 ENCOUNTER — TELEPHONE (OUTPATIENT)
Dept: CARDIOLOGY | Facility: CLINIC | Age: 63
End: 2018-10-24

## 2018-10-24 ENCOUNTER — CLINICAL SUPPORT NO REQUIREMENTS (OUTPATIENT)
Dept: CARDIOLOGY | Facility: CLINIC | Age: 63
End: 2018-10-24

## 2018-10-24 DIAGNOSIS — I48.4 ATYPICAL ATRIAL FLUTTER (HCC): ICD-10-CM

## 2018-10-24 PROCEDURE — 93296 REM INTERROG EVL PM/IDS: CPT | Performed by: INTERNAL MEDICINE

## 2018-10-24 PROCEDURE — 93294 REM INTERROG EVL PM/LDLS PM: CPT | Performed by: INTERNAL MEDICINE

## 2018-10-30 ENCOUNTER — OFFICE VISIT CONVERTED (OUTPATIENT)
Dept: PULMONOLOGY | Facility: CLINIC | Age: 63
End: 2018-10-30
Attending: INTERNAL MEDICINE

## 2018-11-19 ENCOUNTER — TELEPHONE (OUTPATIENT)
Dept: CARDIOLOGY | Facility: CLINIC | Age: 63
End: 2018-11-19

## 2018-11-19 RX ORDER — SPIRONOLACTONE 50 MG/1
50 TABLET, FILM COATED ORAL DAILY
Qty: 90 TABLET | Refills: 3 | Status: SHIPPED | OUTPATIENT
Start: 2018-11-19 | End: 2019-11-07 | Stop reason: SDUPTHER

## 2018-11-26 ENCOUNTER — OFFICE VISIT CONVERTED (OUTPATIENT)
Dept: FAMILY MEDICINE CLINIC | Facility: CLINIC | Age: 63
End: 2018-11-26
Attending: NURSE PRACTITIONER

## 2018-12-24 RX ORDER — APIXABAN 5 MG/1
TABLET, FILM COATED ORAL
Qty: 180 TABLET | Refills: 3 | Status: SHIPPED | OUTPATIENT
Start: 2018-12-24 | End: 2019-12-23

## 2019-01-03 ENCOUNTER — OFFICE VISIT CONVERTED (OUTPATIENT)
Dept: FAMILY MEDICINE CLINIC | Facility: CLINIC | Age: 64
End: 2019-01-03
Attending: NURSE PRACTITIONER

## 2019-01-07 ENCOUNTER — HOSPITAL ENCOUNTER (OUTPATIENT)
Dept: FAMILY MEDICINE CLINIC | Facility: CLINIC | Age: 64
Discharge: HOME | End: 2019-01-07
Attending: NURSE PRACTITIONER

## 2019-01-07 LAB
ALBUMIN SERPL-MCNC: 4.4 G/DL (ref 3.5–5)
ALBUMIN/GLOB SERPL: 1.6 {RATIO} (ref 1.4–2.6)
ALP SERPL-CCNC: 62 U/L (ref 43–160)
ALT SERPL-CCNC: 14 U/L (ref 10–40)
AMPHETAMINES UR QL SCN: NEGATIVE
ANION GAP SERPL CALC-SCNC: 16 MMOL/L (ref 8–19)
APPEARANCE UR: CLEAR
AST SERPL-CCNC: 18 U/L (ref 15–50)
BARBITURATES UR QL SCN: NEGATIVE
BENZODIAZ UR QL SCN: POSITIVE
BILIRUB SERPL-MCNC: 0.72 MG/DL (ref 0.2–1.3)
BILIRUB UR QL: NEGATIVE
BUN SERPL-MCNC: 15 MG/DL (ref 5–25)
BUN/CREAT SERPL: 18 {RATIO} (ref 6–20)
CALCIUM SERPL-MCNC: 9.7 MG/DL (ref 8.7–10.4)
CHLORIDE SERPL-SCNC: 102 MMOL/L (ref 99–111)
CHOLEST SERPL-MCNC: 108 MG/DL (ref 107–200)
CHOLEST/HDLC SERPL: 3 {RATIO} (ref 3–6)
COLOR UR: YELLOW
CONV BACTERIA: NEGATIVE
CONV CO2: 26 MMOL/L (ref 22–32)
CONV COCAINE, UR: NEGATIVE
CONV COLLECTION SOURCE (UA): ABNORMAL
CONV TOTAL PROTEIN: 7.2 G/DL (ref 6.3–8.2)
CONV UROBILINOGEN IN URINE BY AUTOMATED TEST STRIP: 0.2 {EHRLICHU}/DL (ref 0.1–1)
CREAT UR-MCNC: 0.84 MG/DL (ref 0.5–0.9)
GFR SERPLBLD BASED ON 1.73 SQ M-ARVRAT: >60 ML/MIN/{1.73_M2}
GLOBULIN UR ELPH-MCNC: 2.8 G/DL (ref 2–3.5)
GLUCOSE SERPL-MCNC: 89 MG/DL (ref 65–99)
GLUCOSE UR QL: NEGATIVE MG/DL
HDLC SERPL-MCNC: 36 MG/DL (ref 40–60)
HGB UR QL STRIP: ABNORMAL
KETONES UR QL STRIP: NEGATIVE MG/DL
LDLC SERPL CALC-MCNC: 48 MG/DL (ref 70–100)
LEUKOCYTE ESTERASE UR QL STRIP: NEGATIVE
METHADONE UR QL SCN: NEGATIVE
NITRITE UR QL STRIP: NEGATIVE
OPIATES TESTED UR SCN: NEGATIVE
OSMOLALITY SERPL CALC.SUM OF ELEC: 290 MOSM/KG (ref 273–304)
OXYCODONE UR QL SCN: NEGATIVE
PCP UR QL: NEGATIVE
PH UR STRIP.AUTO: 5.5 [PH] (ref 5–8)
POTASSIUM SERPL-SCNC: 4.1 MMOL/L (ref 3.5–5.3)
PROT UR QL: NEGATIVE MG/DL
RBC #/AREA URNS HPF: ABNORMAL /[HPF]
SODIUM SERPL-SCNC: 140 MMOL/L (ref 135–147)
SP GR UR: 1.02 (ref 1–1.03)
T4 FREE SERPL-MCNC: 1.4 NG/DL (ref 0.9–1.8)
THC SERPLBLD CFM-MCNC: NEGATIVE NG/ML
TRIGL SERPL-MCNC: 120 MG/DL (ref 40–150)
TSH SERPL-ACNC: 1.54 M[IU]/L (ref 0.27–4.2)
VLDLC SERPL-MCNC: 24 MG/DL (ref 5–37)
WBC #/AREA URNS HPF: ABNORMAL /[HPF]

## 2019-01-08 ENCOUNTER — OFFICE VISIT (OUTPATIENT)
Dept: CARDIOLOGY | Facility: CLINIC | Age: 64
End: 2019-01-08

## 2019-01-08 VITALS
OXYGEN SATURATION: 90 % | SYSTOLIC BLOOD PRESSURE: 108 MMHG | WEIGHT: 170 LBS | HEIGHT: 64 IN | DIASTOLIC BLOOD PRESSURE: 60 MMHG | HEART RATE: 80 BPM | BODY MASS INDEX: 29.02 KG/M2

## 2019-01-08 DIAGNOSIS — I47.1 ATRIAL TACHYCARDIA (HCC): ICD-10-CM

## 2019-01-08 DIAGNOSIS — I44.2 CHB (COMPLETE HEART BLOCK) (HCC): ICD-10-CM

## 2019-01-08 DIAGNOSIS — I48.0 PAF (PAROXYSMAL ATRIAL FIBRILLATION) (HCC): ICD-10-CM

## 2019-01-08 DIAGNOSIS — R06.09 DOE (DYSPNEA ON EXERTION): Primary | ICD-10-CM

## 2019-01-08 DIAGNOSIS — J84.9 INTERSTITIAL LUNG DISEASE (HCC): ICD-10-CM

## 2019-01-08 DIAGNOSIS — I38 VALVULAR HEART DISEASE: ICD-10-CM

## 2019-01-08 PROCEDURE — 99213 OFFICE O/P EST LOW 20 MIN: CPT | Performed by: INTERNAL MEDICINE

## 2019-01-08 PROCEDURE — 93280 PM DEVICE PROGR EVAL DUAL: CPT | Performed by: INTERNAL MEDICINE

## 2019-01-08 NOTE — PROGRESS NOTES
Deb Lucianoyajaira  1955  [phone]      01/08/2019      [facility] CARDIOLOGY     Minerva Bucio MD  [address] [place] [address] [address] [place] [id number]    Chief Complaint   Patient presents with   • Atrial Flutter   • Slow Heart Rate       Problem List:  Atrial Flutter      a. EP study, November 2003, at an outside hospital with no RFA performed.   b. Sotalol initiated, November 2003.   c. Recurrent, persistent atrial flutter with rapid ventricular rate and 1-to-1 AV conduction at 240 BPM.   d. EP study, 04/22/2004, with radiofrequency ablation of atrial flutter around the ASD patch, radiofrequency ablation of isthmus dependent counter clockwise atrial flutter, radiofrequency ablation of right atrial incisional scar flutter and a fourth atrial flutter which was possibly left atrial in origin and was not mapped nor ablated.   e. Recurrent atrial flutter, June 2004.   f. Repeat EP study with successful radiofrequency ablation of a right atrial scar related flutter along the anterolateral wall. Sinus node dysfunction was also demonstrated during that study while she was on both on IV Diltiazem and Digoxin.   g. Echocardiogram, 10/03/2007, with EF 55% to 60%, severe TR, mitral valve area of 1.8 cm² consistent with mild mitral stenosis, LA 4.5.  h. Implantation of a dual chamber permanent pacemaker, 02/25/2008 (Medtronic device).  i. EP study with radiofrequency ablation of atrial tachycardia from the high anterolateral atrial wall, 07/31/2008 with conduction of left atrial flutter, not mapped or ablated.   j. Rythmol increased, 07/31/2008.  k. Hospitalization, 11/13/2014 at [facility] for recurrent atrial tachycardia/flutter treated with cardioversion. Continuation of Norvasc and titration of diltiazem as well as addition of Eliquis therapy.  l. Readmission to [facility] in [place], [place] emergency room secondary to palpitations and syncope on 12/24/2014 with  device interrogation suggesting possible SVT versus ventricular tachycardia.   m. EPS study with RFA for atrial tachycardia, 01/16/2015.   n. Tikosyn initiated 01/16/2015  o. RFA AV Node 4/10/16 and RFA of AT and RA scar flutter   VHD/Tricuspid Regurgitation)      p. DELIA, 04/20/2004 with moderate tricuspid regurgitation, mild holosystolic mitral valve prolapse, marked right atrial enlargement at approximately 7-8 cm, normal LV size and function with LVEF (72%), right enlarged ventricular size and diminished systolic function.   q. Echocardiogram, 02/01/2006: EF (70%), moderately dilated RV, mild TR, mild pulmonary hypertension.   r. Echocardiogram in October 2007: Mild MS, moderate RV enlargement, moderate to severe tricuspid regurgitation, normal LV size and function, RVSP of 45 mmHg.  s. Right heart cath, 08/01/2008 with pulmonary artery pressure of 48/22, RV 53/20, cardiac output 5, cardiac index 2/8, wedge 26.   t. Echocardiogram, April 2009: LVEF (50% to 55%), severe right ventricular enlargement, moderate to severe tricuspid insufficiency, mild to moderate MR, RVSP estimated to be 40-50 mmHg.   u. Echocardiogram, 02/23/2012, with ejection fraction of 50% to 55%, LAE at 4 cm, mild MS with mild restriction of the mitral valve leaflet, prolapse of the posterior leaflet, mean gradient of 3.5 mmHg, mild TR with an RVSP of 31.  v. Echocardiogram, 04/23/2014, revealing EF 45% to 50% with diastolic dysfunction, mild MR, mild TR. RVSP 42.   w. Echo 2/29/16: LVEF NL, mild TR, mild -mod MR, RV severely dilated  x. Echocardiogram 3/1/18: EF 66-70%, moderate TR  y. DELIA 3/22/18: NL LVEF, Moderate MR, Mod TR, severe RV enlargement,    BUTT                    A, Left and right heart cath 8/18 Normal coronary arteries, Normal LV systolic function with mildly elevated LV filling                                  pressure, Normal pulmonary artery pressure, Moderately elevated right atrial pressure, No evidence of                                                  hemodynamically significant mitral stenosis/regurgitation, suggest progressive RV failure likely related to her                                       previous ASD which has since been surgically repaired.     Asd (Atrial Septal Defect)      ASD repair at age of 6      AVB       PM generator change out 3/2/17                  Allergies  Allergies   Allergen Reactions   • Adenosine Nausea Only   • Codeine Nausea Only and Dizziness       Current Medications    Current Outpatient Medications:   •  Albuterol Sulfate (VENTOLIN HFA IN), Inhale 2 puffs 4 (Four) Times a Day As Needed., Disp: , Rfl:   •  dofetilide (TIKOSYN) 500 MCG capsule, TAKE 1 CAPSULE TWICE A DAY, Disp: 180 capsule, Rfl: 2  •  ELIQUIS 5 MG tablet tablet, TAKE 1 TABLET TWICE A DAY, Disp: 180 tablet, Rfl: 3  •  furosemide (LASIX) 40 MG tablet, TAKE 1 TABLET DAILY, Disp: 90 tablet, Rfl: 3  •  levothyroxine (SYNTHROID, LEVOTHROID) 25 MCG tablet, Take 75 mcg by mouth Daily., Disp: , Rfl: 5  •  metoprolol tartrate (LOPRESSOR) 25 MG tablet, TAKE 1 TABLET DAILY, Disp: 90 tablet, Rfl: 2  •  polyethylene glycol (MIRALAX) packet, Take 17 g by mouth Daily As Needed., Disp: , Rfl:   •  rosuvastatin (CRESTOR) 20 MG tablet, Take 20 mg by mouth Every Night., Disp: , Rfl:   •  spironolactone (ALDACTONE) 50 MG tablet, Take 1 tablet by mouth Daily., Disp: 90 tablet, Rfl: 3  •  temazepam (RESTORIL) 30 MG capsule, Take 30 mg by mouth at night as needed for sleep., Disp: , Rfl:     History of Present Illness     Pt presents for follow up of AT/AFL/AF/AVB/TR.Since the pt has seen us in clinic last, pt underwent right and left heart catheterization results noted above.  Since that time, she denies any syncope, LH, and dizziness.  She does continue to have dyspnea on exertion which is unchanged.  Also with intermittent substernal chest tightness with exertion.  Denies any hospitalizations, ER visits, bleeding, or TIA/CVA symptoms.  She was treated  "in the past month for possible bronchitis/pneumonia with both antibiotics as well as steroids.  She does continue to have a chronic cough.  She has seen a pulmonary doctor which apparently sent her to a rheumatoid arthritis doctor to evaluate the  possible causes of pulmonary fibrosis.  Workup is presently pending.  Overall feels ok    ROS:  General:  + fatigue, No weight gain or loss  Cardiovascular:  Denies CP, PND, syncope, near syncope, edema or palpitations.  Pulmonary:  + Chronic moderate BUTT, + cough, No wheezing    Vitals:    01/08/19 1022   BP: 108/60   BP Location: Left arm   Patient Position: Sitting   Pulse: 80   SpO2: 90%   Weight: 77.1 kg (170 lb)   Height: 162.6 cm (64\")       PE:  General: NAD  Neck: no JVD, no carotid bruits, no TM  Heart: RRR, NL S1, S2, S4 present, no rubs, + TR murmur, RV heave is noted  Lungs: CTA, no wheezes, rhonchi, or rales  Abd: soft, non-tender, NL BS  Ext: No musculoskeletal deformities, no edema, cyanosis, or clubbing  Psych: normal mood and affect    Diagnostic Data:    ECG 12 Lead  Date/Time: 1/8/2019 10:40 AM  Performed by: Manuel Tavarez MD  Authorized by: Manuel Tavarez MD   Comparison: compared with previous ECG from 8/14/2018  Comparison to previous ECG: PAC's new  Rhythm: sinus rhythm and paced  Ectopy: atrial premature contractions  BPM: 86                1. BUTT (dyspnea on exertion)    2. Interstitial lung disease (CMS/HCC)    3. PAF (paroxysmal atrial fibrillation) (CMS/HCC)    4. Atrial tachycardia (CMS/HCC)    5. CHB (complete heart block) (CMS/HCC)    6. Valvular heart disease        PM Interrogation: NL PM fxn, Nl battery fxn, 7% AF/AT mainly AT all episodes in September, longest 1hr    Plan:  1) CP/progressive BUTT: Appears mainly noncardiac with normal LV size and function normal coronary arteries.  Follow-up with her pulmonary doctor and rheumatoid arthritis specialist.  2) Pulm HTN: Normal.  Normal pulmonary rtery pressures by most recent right " heart catheterization.  3) AF/AT:  - S/p AVN ablation, 7% mode switch, overall well controlled on Tikosyn  4) Anticoagulation  - Continue Eliquis  5) AVB:  - S/p AVN ablation, PPM, device with normal function  6) VHD:  - Moderate MR per DELIA 03/2018    F/up in 6 months

## 2019-02-01 ENCOUNTER — HOSPITAL ENCOUNTER (OUTPATIENT)
Dept: CT IMAGING | Facility: HOSPITAL | Age: 64
Discharge: HOME | End: 2019-02-01
Attending: INTERNAL MEDICINE

## 2019-02-18 RX ORDER — DOFETILIDE 0.5 MG/1
CAPSULE ORAL
Qty: 180 CAPSULE | Refills: 3 | Status: SHIPPED | OUTPATIENT
Start: 2019-02-18 | End: 2020-02-13

## 2019-02-27 ENCOUNTER — TELEPHONE (OUTPATIENT)
Dept: CARDIOLOGY | Facility: CLINIC | Age: 64
End: 2019-02-27

## 2019-02-27 ENCOUNTER — CLINICAL SUPPORT NO REQUIREMENTS (OUTPATIENT)
Dept: CARDIOLOGY | Facility: CLINIC | Age: 64
End: 2019-02-27

## 2019-02-27 DIAGNOSIS — I44.30 AV BLOCK: ICD-10-CM

## 2019-02-27 PROCEDURE — 93296 REM INTERROG EVL PM/IDS: CPT | Performed by: INTERNAL MEDICINE

## 2019-02-27 PROCEDURE — 93294 REM INTERROG EVL PM/LDLS PM: CPT | Performed by: INTERNAL MEDICINE

## 2019-02-28 ENCOUNTER — OFFICE VISIT CONVERTED (OUTPATIENT)
Dept: PULMONOLOGY | Facility: CLINIC | Age: 64
End: 2019-02-28
Attending: INTERNAL MEDICINE

## 2019-03-04 ENCOUNTER — HOSPITAL ENCOUNTER (OUTPATIENT)
Dept: FAMILY MEDICINE CLINIC | Facility: CLINIC | Age: 64
Discharge: HOME | End: 2019-03-04
Attending: NURSE PRACTITIONER

## 2019-03-04 LAB
APPEARANCE UR: CLEAR
BILIRUB UR QL: NEGATIVE
COLOR UR: YELLOW
CONV COLLECTION SOURCE (UA): NORMAL
CONV UROBILINOGEN IN URINE BY AUTOMATED TEST STRIP: 0.2 {EHRLICHU}/DL (ref 0.1–1)
GLUCOSE UR QL: NEGATIVE MG/DL
HGB UR QL STRIP: NEGATIVE
KETONES UR QL STRIP: NEGATIVE MG/DL
LEUKOCYTE ESTERASE UR QL STRIP: NEGATIVE
NITRITE UR QL STRIP: NEGATIVE
PH UR STRIP.AUTO: 6.5 [PH] (ref 5–8)
PROT UR QL: NEGATIVE MG/DL
SP GR UR: 1.01 (ref 1–1.03)

## 2019-03-07 ENCOUNTER — OFFICE VISIT CONVERTED (OUTPATIENT)
Dept: FAMILY MEDICINE CLINIC | Facility: CLINIC | Age: 64
End: 2019-03-07
Attending: NURSE PRACTITIONER

## 2019-04-22 ENCOUNTER — OFFICE VISIT CONVERTED (OUTPATIENT)
Dept: FAMILY MEDICINE CLINIC | Facility: CLINIC | Age: 64
End: 2019-04-22
Attending: NURSE PRACTITIONER

## 2019-05-06 ENCOUNTER — HOSPITAL ENCOUNTER (OUTPATIENT)
Dept: OTHER | Facility: HOSPITAL | Age: 64
Discharge: HOME | End: 2019-05-06
Attending: NURSE PRACTITIONER

## 2019-05-29 ENCOUNTER — CLINICAL SUPPORT NO REQUIREMENTS (OUTPATIENT)
Dept: CARDIOLOGY | Facility: CLINIC | Age: 64
End: 2019-05-29

## 2019-05-29 ENCOUNTER — TELEPHONE (OUTPATIENT)
Dept: CARDIOLOGY | Facility: CLINIC | Age: 64
End: 2019-05-29

## 2019-05-29 DIAGNOSIS — I44.30 AV BLOCK: ICD-10-CM

## 2019-05-29 PROCEDURE — 93296 REM INTERROG EVL PM/IDS: CPT | Performed by: INTERNAL MEDICINE

## 2019-05-29 PROCEDURE — 93294 REM INTERROG EVL PM/LDLS PM: CPT | Performed by: INTERNAL MEDICINE

## 2019-05-29 NOTE — TELEPHONE ENCOUNTER
Called to remind pt it is time to send in reading on her pacemaker with her [employer] home monitor.  She is going to do later today.

## 2019-06-03 ENCOUNTER — OFFICE VISIT CONVERTED (OUTPATIENT)
Dept: FAMILY MEDICINE CLINIC | Facility: CLINIC | Age: 64
End: 2019-06-03
Attending: NURSE PRACTITIONER

## 2019-06-03 ENCOUNTER — HOSPITAL ENCOUNTER (OUTPATIENT)
Dept: FAMILY MEDICINE CLINIC | Facility: CLINIC | Age: 64
Discharge: HOME | End: 2019-06-03
Attending: NURSE PRACTITIONER

## 2019-06-03 LAB
ALBUMIN SERPL-MCNC: 4.8 G/DL (ref 3.5–5)
ALBUMIN/GLOB SERPL: 1.5 {RATIO} (ref 1.4–2.6)
ALP SERPL-CCNC: 74 U/L (ref 43–160)
ALT SERPL-CCNC: 16 U/L (ref 10–40)
ANION GAP SERPL CALC-SCNC: 17 MMOL/L (ref 8–19)
AST SERPL-CCNC: 20 U/L (ref 15–50)
BILIRUB SERPL-MCNC: 1.33 MG/DL (ref 0.2–1.3)
BUN SERPL-MCNC: 13 MG/DL (ref 5–25)
BUN/CREAT SERPL: 15 {RATIO} (ref 6–20)
CALCIUM SERPL-MCNC: 9.9 MG/DL (ref 8.7–10.4)
CHLORIDE SERPL-SCNC: 99 MMOL/L (ref 99–111)
CHOLEST SERPL-MCNC: 95 MG/DL (ref 107–200)
CHOLEST/HDLC SERPL: 2.5 {RATIO} (ref 3–6)
CONV CO2: 28 MMOL/L (ref 22–32)
CONV TOTAL PROTEIN: 7.9 G/DL (ref 6.3–8.2)
CREAT UR-MCNC: 0.89 MG/DL (ref 0.5–0.9)
GFR SERPLBLD BASED ON 1.73 SQ M-ARVRAT: >60 ML/MIN/{1.73_M2}
GLOBULIN UR ELPH-MCNC: 3.1 G/DL (ref 2–3.5)
GLUCOSE SERPL-MCNC: 90 MG/DL (ref 65–99)
HDLC SERPL-MCNC: 38 MG/DL (ref 40–60)
LDLC SERPL CALC-MCNC: 37 MG/DL (ref 70–100)
OSMOLALITY SERPL CALC.SUM OF ELEC: 290 MOSM/KG (ref 273–304)
POTASSIUM SERPL-SCNC: 4.1 MMOL/L (ref 3.5–5.3)
SODIUM SERPL-SCNC: 140 MMOL/L (ref 135–147)
TRIGL SERPL-MCNC: 100 MG/DL (ref 40–150)
VLDLC SERPL-MCNC: 20 MG/DL (ref 5–37)

## 2019-06-04 LAB — HCV AB SER DONR QL: <0.1 S/CO RATIO (ref 0–0.9)

## 2019-06-13 ENCOUNTER — OFFICE VISIT CONVERTED (OUTPATIENT)
Dept: PULMONOLOGY | Facility: CLINIC | Age: 64
End: 2019-06-13
Attending: INTERNAL MEDICINE

## 2019-07-09 ENCOUNTER — OFFICE VISIT (OUTPATIENT)
Dept: CARDIOLOGY | Facility: CLINIC | Age: 64
End: 2019-07-09

## 2019-07-09 VITALS
HEART RATE: 85 BPM | DIASTOLIC BLOOD PRESSURE: 80 MMHG | BODY MASS INDEX: 29.19 KG/M2 | WEIGHT: 171 LBS | HEIGHT: 64 IN | SYSTOLIC BLOOD PRESSURE: 121 MMHG

## 2019-07-09 DIAGNOSIS — J84.9 INTERSTITIAL LUNG DISEASE (HCC): ICD-10-CM

## 2019-07-09 DIAGNOSIS — I48.4 ATYPICAL ATRIAL FLUTTER (HCC): Primary | ICD-10-CM

## 2019-07-09 DIAGNOSIS — I44.30 AV BLOCK: ICD-10-CM

## 2019-07-09 DIAGNOSIS — I47.1 ATRIAL TACHYCARDIA (HCC): ICD-10-CM

## 2019-07-09 DIAGNOSIS — I38 VALVULAR HEART DISEASE: ICD-10-CM

## 2019-07-09 PROCEDURE — 93000 ELECTROCARDIOGRAM COMPLETE: CPT | Performed by: NURSE PRACTITIONER

## 2019-07-09 PROCEDURE — 99213 OFFICE O/P EST LOW 20 MIN: CPT | Performed by: INTERNAL MEDICINE

## 2019-07-09 PROCEDURE — 93280 PM DEVICE PROGR EVAL DUAL: CPT | Performed by: INTERNAL MEDICINE

## 2019-07-09 NOTE — PROGRESS NOTES
Deb Harrison  1955    There is no work phone number on file.      07/09/2019    [facility] CARDIOLOGY     Minerva Bucio MD  [address] [place] [address]    Chief Complaint   Patient presents with   • BUTT (dyspnea on exertion)       Problem List:     Atrial Flutter     A. EP study, November 2003, at an outside hospital with no RFA performed.    B. Sotalol initiated, November 2003.    C. Recurrent, persistent atrial flutter with rapid ventricular rate and 1-to-1 AV conduction at 240   BPM.    D. EP study, 04/22/2004, with radiofrequency ablation of atrial flutter around the ASD patch,   radiofrequency ablation of isthmus dependent counter clockwise atrial flutter, radiofrequency   ablation of right atrial incisional scar flutter and a fourth atrial flutter which was possibly left   atrial in origin and was not mapped nor ablated.    E.  Recurrent atrial flutter, June 2004.    F.  Repeat EP study with successful radiofrequency ablation of a right atrial scar related flutter   along the anterolateral wall. Sinus node dysfunction was also demonstrated during that   study while she was on both on IV Diltiazem and Digoxin.    G.  Echocardiogram, 10/03/2007, with EF 55% to 60%, severe TR, mitral valve area of 1.8 cm²   consistent with mild mitral stenosis, LA 4.5.   H. Implantation of a dual chamber permanent pacemaker, 02/25/2008 (Medtronic device).   I. EP study with radiofrequency ablation of atrial tachycardia from the high anterolateral atrial wall,   07/31/2008 with conduction of left atrial flutter, not mapped or ablated.    J. Rythmol increased, 07/31/2008   K. Hospitalization, 11/13/2014 at [facility] for recurrent atrial tachycardia/flutter   treated with cardioversion. Continuation of Norvasc and titration of diltiazem as well as   addition of Eliquis therapy.   I. Readmission to [facility] in [place], [place] emergency room  secondary to   palpitations and syncope on 12/24/2014 with device interrogation suggesting possible SVT   versus ventricular tachycardia.    M. EPS study with RFA for atrial tachycardia, 01/16/2015.    MALENA Nielsen initiated 01/16/2015   O. RFA AV Node 4/10/16 and RFA of AT and RA scar flutter    TR/Pulm HTN   A. DELIA, 04/20/2004 with moderate tricuspid regurgitation, mild holosystolic mitral valve prolapse,   marked right atrial enlargement at approximately 7-8 cm, normal LV size and function with   LVEF (72%), right enlarged ventricular size and diminished systolic function.   B.  Echocardiogram, 02/01/2006: EF (70%), moderately dilated RV, mild TR, mild pulmonary  hypertension.   C.  Echocardiogram in October 2007: Mild MS, moderate RV enlargement, moderate to severe  tricuspid regurgitation, normal LV size and function, RVSP of 45 mmHg.             D. Right heart cath, 08/01/2008 with pulmonary artery pressure of 48/22, RV 53/20, cardiac output   5, cardiac index 2/8, wedge 26.    E. Echocardiogram, April 2009: LVEF (50% to 55%), severe right ventricular enlargement,  moderate to severe tricuspid insufficiency, mild to moderate MR, RVSP estimated to be  40-50 mmHg.    F.  Echocardiogram, 02/23/2012, with ejection fraction of 50% to 55%, LAE at 4 cm, mild MS with  mild restriction of the mitral valve leaflet, prolapse of the posterior leaflet, mean gradient of  3.5 mmHg, mild TR with an RVSP of 31.   G  Echocardiogram, 04/23/2014, revealing EF 45% to 50% with diastolic dysfunction, mild MR,  Mild TR. RVSP 42.     H. Echo 2/29/16: LVEF NL, mild TR, mild -mod MR, RV severely dilated     I.  Echocardiogram 3/1/18: EF 66-70%, moderate TR     J.  DELIA 3/22/18: NL LVEF, Moderate MR, Mod TR, severe RV enlargement     K.  R/l HEART CATH 8/18 normal coronary arteries, normal LVEF with mild elevated LV pressure,   normal pulmonary artery pressure, moderately elevated right atrial pressure, no evidence of    MS or MR; felt the  previous ASD was responsible for progressive RV failure.      Asd (Atrial Septal Defect)     ASD repair at age of 6     AVB       PM generator change out 3/2/17            Allergies  Allergies   Allergen Reactions   • Adenosine Nausea Only   • Codeine Nausea Only and Dizziness       Current Medications    Current Outpatient Medications:   •  dofetilide (TIKOSYN) 500 MCG capsule, TAKE 1 CAPSULE TWICE A DAY, Disp: 180 capsule, Rfl: 3  •  ELIQUIS 5 MG tablet tablet, TAKE 1 TABLET TWICE A DAY, Disp: 180 tablet, Rfl: 3  •  furosemide (LASIX) 40 MG tablet, TAKE 1 TABLET DAILY, Disp: 90 tablet, Rfl: 3  •  levothyroxine (SYNTHROID, LEVOTHROID) 25 MCG tablet, Take 75 mcg by mouth Daily., Disp: , Rfl: 5  •  metoprolol tartrate (LOPRESSOR) 25 MG tablet, Take 1 tablet by mouth Daily., Disp: 90 tablet, Rfl: 2  •  polyethylene glycol (MIRALAX) packet, Take 17 g by mouth Daily As Needed., Disp: , Rfl:   •  rosuvastatin (CRESTOR) 20 MG tablet, Take 20 mg by mouth Every Night., Disp: , Rfl:   •  spironolactone (ALDACTONE) 50 MG tablet, Take 1 tablet by mouth Daily., Disp: 90 tablet, Rfl: 3  •  temazepam (RESTORIL) 30 MG capsule, Take 30 mg by mouth at night as needed for sleep., Disp: , Rfl:   •  umeclidinium-vilanterol (ANORO ELLIPTA) 62.5-25 MCG/INH aerosol powder  inhaler, Inhale 1 puff Daily., Disp: , Rfl:     History of Present Illness   HPI    Pt presents for follow up of AF, AVB, PM check. Since we last saw the pt, she has seen her pulmonologist who has started her on Anoro Ellipta which she reports has helped some.  Still has BUTT daily, using oxygen on a PRN basis.  She reports her pulmonolgist is concerned about pulmonary hypertension and wanted her to follow up with us to discuss starting medication for this.  Last RHC in 2018 showing normal pulmonary artery pressures.  She denies any CP, LH, or dizziness. Denies any hospitalizations, ER visits, bleeding, or TIA/CVA symptoms. Overall feels well.    ROS:  General:  +  "fatigue, no weight gain or loss  Cardiovascular:  Denies CP, PND, syncope, near syncope, edema + occasional palpitations.  Pulmonary:  + BUTT, no cough, or wheezing      Vitals:    07/09/19 1232   BP: 121/80   BP Location: Right arm   Patient Position: Sitting   Pulse: 85   Weight: 77.6 kg (171 lb)   Height: 162.6 cm (64\")     PE:  General: NAD  Neck: no JVD, no carotid bruits, no TM  Heart RRR, NL S1, S2, RV heave no rubs, 3/6 systolic ejection murmur  Lungs: CTA, no wheezes, rhonchi, or rales  Abd: soft, non-tender, NL BS  Ext: No musculoskeletal deformities, no edema, cyanosis, or clubbing  Psych: normal mood and affect    Diagnostic Data:        ECG 12 Lead  Date/Time: 7/9/2019 12:39 PM  Performed by: Lisa Snow APRN  Authorized by: Lisa Snow APRN   Comparison: compared with previous ECG from 1/8/2019  Similar to previous ECG  Rhythm: paced  BPM: 83              1. Atypical atrial flutter (CMS/HCC)    2. Atrial tachycardia (CMS/HCC)    3. AV block    4. Valvular heart disease    5. Interstitial lung disease (CMS/HCC)      Manual device interrogation: MDT PPM with normal function, <1% RA paced, 80% RV paced, <1% AF, 8.5 years battery life    Plan:  1) Atypical atrial flutter/AT:  - S/p multiple ablations.  Now on Tikosyn and maintaining NSR.  QTc acceptable with V pacing.   - Continue present medications.   2) Anticoagulation:  - Continue Eliquis  3) AV block:  - S/p AVN ablation + PPM implant, device with normal function  4) VHD:  - Moderate MR per DELIA 2018  5) ILD:  - Follows with pulmonology, now on inhaler and PRN oxygen    F/up in 6 months    Scribed for Manuel Tavarez MD by NIRU Foy. 7/9/2019  12:52 PM     IManuel MD, personally performed the services described in this documentation as scribed by the above named individual in my presence, and it is both accurate and complete.  7/9/2019  1:18 PM          "

## 2019-08-05 RX ORDER — FUROSEMIDE 40 MG/1
TABLET ORAL
Qty: 90 TABLET | Refills: 3 | Status: SHIPPED | OUTPATIENT
Start: 2019-08-05 | End: 2020-07-30

## 2019-08-27 ENCOUNTER — OFFICE VISIT CONVERTED (OUTPATIENT)
Dept: FAMILY MEDICINE CLINIC | Facility: CLINIC | Age: 64
End: 2019-08-27
Attending: NURSE PRACTITIONER

## 2019-08-27 ENCOUNTER — CONVERSION ENCOUNTER (OUTPATIENT)
Dept: FAMILY MEDICINE CLINIC | Facility: CLINIC | Age: 64
End: 2019-08-27

## 2019-08-28 ENCOUNTER — CLINICAL SUPPORT NO REQUIREMENTS (OUTPATIENT)
Dept: CARDIOLOGY | Facility: CLINIC | Age: 64
End: 2019-08-28

## 2019-08-28 DIAGNOSIS — I48.4 ATYPICAL ATRIAL FLUTTER (HCC): ICD-10-CM

## 2019-08-28 DIAGNOSIS — I44.30 AV BLOCK: Primary | ICD-10-CM

## 2019-08-28 DIAGNOSIS — I47.1 ATRIAL TACHYCARDIA (HCC): ICD-10-CM

## 2019-08-28 PROCEDURE — 93296 REM INTERROG EVL PM/IDS: CPT | Performed by: INTERNAL MEDICINE

## 2019-08-28 PROCEDURE — 93294 REM INTERROG EVL PM/LDLS PM: CPT | Performed by: INTERNAL MEDICINE

## 2019-09-05 ENCOUNTER — OFFICE VISIT CONVERTED (OUTPATIENT)
Dept: FAMILY MEDICINE CLINIC | Facility: CLINIC | Age: 64
End: 2019-09-05
Attending: NURSE PRACTITIONER

## 2019-09-26 ENCOUNTER — PATIENT OUTREACH - CONVERTED (OUTPATIENT)
Dept: FAMILY MEDICINE CLINIC | Facility: CLINIC | Age: 64
End: 2019-09-26
Attending: FAMILY MEDICINE

## 2019-10-02 ENCOUNTER — OFFICE VISIT CONVERTED (OUTPATIENT)
Dept: PULMONOLOGY | Facility: CLINIC | Age: 64
End: 2019-10-02
Attending: INTERNAL MEDICINE

## 2019-10-23 ENCOUNTER — PATIENT OUTREACH - CONVERTED (OUTPATIENT)
Dept: FAMILY MEDICINE CLINIC | Facility: CLINIC | Age: 64
End: 2019-10-23
Attending: FAMILY MEDICINE

## 2019-11-07 RX ORDER — SPIRONOLACTONE 50 MG/1
TABLET, FILM COATED ORAL
Qty: 90 TABLET | Refills: 3 | Status: SHIPPED | OUTPATIENT
Start: 2019-11-07 | End: 2020-11-09

## 2019-11-27 ENCOUNTER — CLINICAL SUPPORT NO REQUIREMENTS (OUTPATIENT)
Dept: CARDIOLOGY | Facility: CLINIC | Age: 64
End: 2019-11-27

## 2019-11-27 DIAGNOSIS — I44.30 AV BLOCK: ICD-10-CM

## 2019-11-27 PROCEDURE — 93294 REM INTERROG EVL PM/LDLS PM: CPT | Performed by: INTERNAL MEDICINE

## 2019-11-27 PROCEDURE — 93296 REM INTERROG EVL PM/IDS: CPT | Performed by: INTERNAL MEDICINE

## 2019-12-02 ENCOUNTER — OFFICE VISIT CONVERTED (OUTPATIENT)
Dept: FAMILY MEDICINE CLINIC | Facility: CLINIC | Age: 64
End: 2019-12-02
Attending: NURSE PRACTITIONER

## 2019-12-02 ENCOUNTER — HOSPITAL ENCOUNTER (OUTPATIENT)
Dept: FAMILY MEDICINE CLINIC | Facility: CLINIC | Age: 64
Discharge: HOME | End: 2019-12-02
Attending: NURSE PRACTITIONER

## 2019-12-02 LAB
ALBUMIN SERPL-MCNC: 4.7 G/DL (ref 3.5–5)
ALBUMIN/GLOB SERPL: 1.7 {RATIO} (ref 1.4–2.6)
ALP SERPL-CCNC: 61 U/L (ref 43–160)
ALT SERPL-CCNC: 12 U/L (ref 10–40)
ANION GAP SERPL CALC-SCNC: 22 MMOL/L (ref 8–19)
AST SERPL-CCNC: 18 U/L (ref 15–50)
BASOPHILS # BLD AUTO: 0.04 10*3/UL (ref 0–0.2)
BASOPHILS NFR BLD AUTO: 0.6 % (ref 0–3)
BILIRUB SERPL-MCNC: 0.96 MG/DL (ref 0.2–1.3)
BUN SERPL-MCNC: 14 MG/DL (ref 5–25)
BUN/CREAT SERPL: 17 {RATIO} (ref 6–20)
CALCIUM SERPL-MCNC: 9.6 MG/DL (ref 8.7–10.4)
CHLORIDE SERPL-SCNC: 101 MMOL/L (ref 99–111)
CONV ABS IMM GRAN: 0.01 10*3/UL (ref 0–0.2)
CONV CO2: 24 MMOL/L (ref 22–32)
CONV IMMATURE GRAN: 0.1 % (ref 0–1.8)
CONV TOTAL PROTEIN: 7.4 G/DL (ref 6.3–8.2)
CREAT UR-MCNC: 0.84 MG/DL (ref 0.5–0.9)
DEPRECATED RDW RBC AUTO: 46.3 FL (ref 36.4–46.3)
EOSINOPHIL # BLD AUTO: 0.19 10*3/UL (ref 0–0.7)
EOSINOPHIL # BLD AUTO: 2.7 % (ref 0–7)
ERYTHROCYTE [DISTWIDTH] IN BLOOD BY AUTOMATED COUNT: 13.7 % (ref 11.7–14.4)
FOLATE SERPL-MCNC: 11.1 NG/ML (ref 4.8–20)
GFR SERPLBLD BASED ON 1.73 SQ M-ARVRAT: >60 ML/MIN/{1.73_M2}
GLOBULIN UR ELPH-MCNC: 2.7 G/DL (ref 2–3.5)
GLUCOSE SERPL-MCNC: 78 MG/DL (ref 65–99)
HCT VFR BLD AUTO: 47.5 % (ref 37–47)
HGB BLD-MCNC: 15.2 G/DL (ref 12–16)
LYMPHOCYTES # BLD AUTO: 2.57 10*3/UL (ref 1–5)
LYMPHOCYTES NFR BLD AUTO: 36.1 % (ref 20–45)
MCH RBC QN AUTO: 29.5 PG (ref 27–31)
MCHC RBC AUTO-ENTMCNC: 32 G/DL (ref 33–37)
MCV RBC AUTO: 92.2 FL (ref 81–99)
MONOCYTES # BLD AUTO: 0.75 10*3/UL (ref 0.2–1.2)
MONOCYTES NFR BLD AUTO: 10.5 % (ref 3–10)
NEUTROPHILS # BLD AUTO: 3.56 10*3/UL (ref 2–8)
NEUTROPHILS NFR BLD AUTO: 50 % (ref 30–85)
NRBC CBCN: 0 % (ref 0–0.7)
OSMOLALITY SERPL CALC.SUM OF ELEC: 295 MOSM/KG (ref 273–304)
PLATELET # BLD AUTO: 184 10*3/UL (ref 130–400)
PMV BLD AUTO: 10.9 FL (ref 9.4–12.3)
POTASSIUM SERPL-SCNC: 3.8 MMOL/L (ref 3.5–5.3)
RBC # BLD AUTO: 5.15 10*6/UL (ref 4.2–5.4)
SODIUM SERPL-SCNC: 143 MMOL/L (ref 135–147)
TSH SERPL-ACNC: 2.63 M[IU]/L (ref 0.27–4.2)
VIT B12 SERPL-MCNC: 363 PG/ML (ref 211–911)
WBC # BLD AUTO: 7.12 10*3/UL (ref 4.8–10.8)

## 2019-12-03 LAB — 25(OH)D3 SERPL-MCNC: 20.5 NG/ML (ref 30–100)

## 2019-12-23 RX ORDER — APIXABAN 5 MG/1
TABLET, FILM COATED ORAL
Qty: 180 TABLET | Refills: 3 | Status: SHIPPED | OUTPATIENT
Start: 2019-12-23 | End: 2019-12-26 | Stop reason: SDUPTHER

## 2020-01-06 ENCOUNTER — OFFICE VISIT CONVERTED (OUTPATIENT)
Dept: FAMILY MEDICINE CLINIC | Facility: CLINIC | Age: 65
End: 2020-01-06
Attending: NURSE PRACTITIONER

## 2020-01-14 ENCOUNTER — OFFICE VISIT (OUTPATIENT)
Dept: CARDIOLOGY | Facility: CLINIC | Age: 65
End: 2020-01-14

## 2020-01-14 VITALS
SYSTOLIC BLOOD PRESSURE: 130 MMHG | BODY MASS INDEX: 30.39 KG/M2 | HEART RATE: 84 BPM | WEIGHT: 178 LBS | HEIGHT: 64 IN | DIASTOLIC BLOOD PRESSURE: 78 MMHG

## 2020-01-14 DIAGNOSIS — J84.9 INTERSTITIAL LUNG DISEASE (HCC): ICD-10-CM

## 2020-01-14 DIAGNOSIS — I44.30 AV BLOCK: ICD-10-CM

## 2020-01-14 DIAGNOSIS — R10.9 ABDOMINAL PRESSURE: ICD-10-CM

## 2020-01-14 DIAGNOSIS — I38 VALVULAR HEART DISEASE: ICD-10-CM

## 2020-01-14 DIAGNOSIS — I48.4 ATYPICAL ATRIAL FLUTTER (HCC): Primary | ICD-10-CM

## 2020-01-14 DIAGNOSIS — I47.1 ATRIAL TACHYCARDIA (HCC): ICD-10-CM

## 2020-01-14 PROCEDURE — 93280 PM DEVICE PROGR EVAL DUAL: CPT | Performed by: INTERNAL MEDICINE

## 2020-01-14 PROCEDURE — 99214 OFFICE O/P EST MOD 30 MIN: CPT | Performed by: INTERNAL MEDICINE

## 2020-01-14 RX ORDER — AMOXICILLIN AND CLAVULANATE POTASSIUM 875; 125 MG/1; MG/1
TABLET, FILM COATED ORAL
COMMUNITY
Start: 2020-01-06 | End: 2020-08-11

## 2020-01-14 NOTE — PROGRESS NOTES
Deb Lucianoyajaira  1955  [phone]    01/14/2020    [facility] MEDICAL [facility] CARDIOLOGY     Minerva Bucio MD  [address] [place] [address] [address] [id number]    Chief Complaint   Patient presents with   • Atypical atrial flutter   • BUTT       Problem List:      Atrial Flutter             A. EP study, November 2003, at an outside hospital with no RFA performed.              B. Sotalol initiated, November 2003.              C. Recurrent, persistent atrial flutter with rapid ventricular rate and 1-to-1 AV conduction at 240                 BPM.              D. EP study, 04/22/2004, with radiofrequency ablation of atrial flutter around the ASD patch,               radiofrequency ablation of isthmus dependent counter clockwise atrial flutter, radiofrequency              ablation of right atrial incisional scar flutter and a fourth atrial flutter which was possibly left               atrial  in origin and was not mapped nor ablated.              E.  Recurrent atrial flutter, June 2004.              F.  Repeat EP study with successful radiofrequency ablation of a right atrial scar related flutter                        along the anterolateral wall. Sinus node dysfunction was also demonstrated during that                     study while she was on both on IV Diltiazem and Digoxin.              G.  Echocardiogram, 10/03/2007, with EF 55% to 60%, severe TR, mitral valve area of 1.8 cm²                     consistent with mild mitral stenosis, LA 4.5.             H. Implantation of a dual chamber permanent pacemaker, 02/25/2008 (Medtronic device).             I. EP study with radiofrequency ablation of atrial tachycardia from the high anterolateral atrial wall,                    07/31/2008 with conduction of left atrial flutter, not mapped or ablated.              J. Rythmol increased, 07/31/2008             K. Hospitalization, 11/13/2014 at [facility] for recurrent atrial  tachycardia/flutter                 treated with cardioversion. Continuation of Norvasc and titration of diltiazem as well as                     addition of Eliquis therapy.             I. Readmission to [facility] in [place], [place] emergency room secondary to                         palpitations and syncope on 12/24/2014 with device interrogation suggesting possible SVT                    versus ventricular tachycardia.              M. EPS study with RFA for atrial tachycardia, 01/16/2015.              MALENA Ulloakosyn initiated 01/16/2015             O. RFA AV Node 4/10/16 and RFA of AT and RA scar flutter     TR/Pulm HTN             A. DELIA, 04/20/2004 with moderate tricuspid regurgitation, mild holosystolic mitral valve prolapse,                     marked right atrial enlargement at approximately 7-8 cm, normal LV size and function with                 LVEF (72%), right enlarged ventricular size and diminished systolic function.   B.  Echocardiogram, 02/01/2006: EF (70%), moderately dilated RV, mild TR, mild pulmonary      hypertension.   C.  Echocardiogram in October 2007: Mild MS, moderate RV enlargement, moderate to severe    tricuspid regurgitation, normal LV size and function, RVSP of 45 mmHg.             D. Right heart cath, 08/01/2008 with pulmonary artery pressure of 48/22, RV 53/20, cardiac output                        5, cardiac index 2/8, wedge 26.    E. Echocardiogram, April 2009: LVEF (50% to 55%), severe right ventricular enlargement,  moderate to severe tricuspid insufficiency, mild to moderate MR, RVSP estimated to be           40-50 mmHg.    F.  Echocardiogram, 02/23/2012, with ejection fraction of 50% to 55%, LAE at 4 cm, mild MS with        mild restriction of the mitral valve leaflet, prolapse of the posterior leaflet, mean gradient of     3.5 mmHg, mild TR with an RVSP of 31.   G  Echocardiogram, 04/23/2014, revealing EF 45% to 50% with diastolic dysfunction, mild MR,             Mild TR. RVSP 42.     H. Echo 2/29/16: LVEF NL, mild TR, mild -mod MR, RV severely dilated     I.  Echocardiogram 3/1/18: EF 66-70%, moderate TR               J.  DELIA 3/22/18: NL LVEF, Moderate MR, Mod TR, severe RV enlargement               K.  R/l HEART CATH 8/18 normal coronary arteries, normal LVEF with mild elevated LV    pressure, normal pulmonary artery pressure, moderately elevated right atrial pressure, no   evidence of MS or MR; felt the previous ASD was responsible for progressive RV failure.        Asd (Atrial Septal Defect)              ASD repair at age of 6     AVB       PM generator change out 3/2/17    Allergies  Allergies   Allergen Reactions   • Adenosine Nausea Only   • Codeine Nausea Only and Dizziness       Current Medications    Current Outpatient Medications:   •  amoxicillin-clavulanate (AUGMENTIN) 875-125 MG per tablet, TAKE 1 TABLET BY MOUTH EVERY TWELVE HOURS FOR 10 DAYS, Disp: , Rfl:   •  apixaban (ELIQUIS) 5 MG tablet tablet, Take 1 tablet by mouth 2 (Two) Times a Day., Disp: 180 tablet, Rfl: 3  •  dofetilide (TIKOSYN) 500 MCG capsule, TAKE 1 CAPSULE TWICE A DAY, Disp: 180 capsule, Rfl: 3  •  Ergocalciferol (VITAMIN D2 PO), Take 50,000 Units by mouth 1 (One) Time Per Week., Disp: , Rfl:   •  furosemide (LASIX) 40 MG tablet, TAKE 1 TABLET DAILY, Disp: 90 tablet, Rfl: 3  •  levothyroxine (SYNTHROID, LEVOTHROID) 25 MCG tablet, Take 75 mcg by mouth Daily., Disp: , Rfl: 5  •  metoprolol tartrate (LOPRESSOR) 25 MG tablet, TAKE 1 TABLET DAILY, Disp: 90 tablet, Rfl: 4  •  polyethylene glycol (MIRALAX) packet, Take 17 g by mouth Daily As Needed., Disp: , Rfl:   •  rosuvastatin (CRESTOR) 20 MG tablet, Take 20 mg by mouth Every Night., Disp: , Rfl:   •  spironolactone (ALDACTONE) 50 MG tablet, TAKE 1 TABLET BY MOUTH EVERY DAY, Disp: 90 tablet, Rfl: 3  •  temazepam (RESTORIL) 30 MG capsule, Take 30 mg by mouth at night as needed for sleep., Disp: , Rfl:   •  umeclidinium-vilanterol (ANORO ELLIPTA)  "62.5-25 MCG/INH aerosol powder  inhaler, Inhale 1 puff Daily., Disp: , Rfl:     History of Present Illness     Pt presents for follow up of atrial tachycardia, atrial flutter, AV Block, PM check. Since we last saw the pt, pt denies any significant AF episodes, just a couple times per week, lasting only seconds. She notes a new complaint of lower chest/upper abdomen tightness, triggered by eating, and feels like a fullness in her chest. She wants to know if she can try taking lactaid. Symptoms last for about one hour after sitting and resting. Not triggered by exertion. She has mild chronic SOB, which is better since starting her new pulmonary medications.  Denies any hospitalizations, ER visits, bleeding issues on Eliquis, or TIA/CVA symptoms. Overall feels tired, more so in the last couple of months.     ROS:  General:  + fatigue, - weight gain or loss  Cardiovascular:  Denies CP, PND, syncope, near syncope, edema or palpitations.  Pulmonary:  + mild BUTT, - cough, or wheezing      Vitals:    01/14/20 1212   BP: 130/78   BP Location: Right arm   Patient Position: Sitting   Pulse: 84   Weight: 80.7 kg (178 lb)   Height: 162.6 cm (64\")     Body mass index is 30.55 kg/m².  PE:  General: NAD  Neck: no JVD, no carotid bruits, no TM  Heart RRR, NL S1, S2, S4, TR murmur is appreciated again today.  PMI normal.  Lungs: CTA, no wheezes, rhonchi, or rales  Abd: soft, non-tender, NL BS  Ext: No musculoskeletal deformities, no edema, cyanosis, or clubbing  Psych: normal mood and affect    Diagnostic Data:    Manual Device Interrogation: (MDT PPM): normal function. RV paced 99%. Less than 1% mode switched. Battery 2.79 V. 8.5 years on battery.       ECG 12 Lead  Date/Time: 1/14/2020 12:37 PM  Performed by: Manuel Tavarez MD  Authorized by: Manuel Tavarez MD   Comparison: compared with previous ECG from 7/9/2019  Similar to previous ECG  Comparison to previous ECG: QTc 460 ms  Rhythm: sinus rhythm and paced            1. " Atypical atrial flutter (CMS/HCC)    2. Atrial tachycardia (CMS/HCC)    3. AV block    4. Valvular heart disease    5. Interstitial lung disease (CMS/HCC)    6. Abdominal pressure          Plan:  1) Atypical atrial flutter/AT:  - S/p multiple ablations in the past  Now on Tikosyn and maintaining NSR. QTc acceptable with RV pacing.   - Continue present medications.   2) Anticoagulation:  - Continue Eliquis  3) AV block:  - S/p AVN ablation + PPM implant, device with normal function  4) VHD:  - Moderate MR per DELIA 2018  5) ILD:  - Follows with pulmonology  6) Abdominal pressure:  - could be GERD or HH. She wants to take Lactaid which does not interact with Tikosyn. Start Nexium for 4 weeks, if no improvement at that time she is to follow-up with her primary doctor for further assessment.    F/up in 6 months    Scribed for Manuel Tavarez MD by Angelica Soler PA-C. 1/14/2020  12:54 PM     I, Manuel Tavarez MD, personally performed the services described in this documentation as scribed by the above named individual in my presence, and it is both accurate and complete.  1/14/2020  1:14 PM

## 2020-01-22 ENCOUNTER — OFFICE VISIT CONVERTED (OUTPATIENT)
Dept: FAMILY MEDICINE CLINIC | Facility: CLINIC | Age: 65
End: 2020-01-22
Attending: FAMILY MEDICINE

## 2020-01-22 ENCOUNTER — HOSPITAL ENCOUNTER (OUTPATIENT)
Dept: FAMILY MEDICINE CLINIC | Facility: CLINIC | Age: 65
Discharge: HOME | End: 2020-01-22
Attending: FAMILY MEDICINE

## 2020-01-22 ENCOUNTER — CONVERSION ENCOUNTER (OUTPATIENT)
Dept: FAMILY MEDICINE CLINIC | Facility: CLINIC | Age: 65
End: 2020-01-22

## 2020-01-30 ENCOUNTER — OFFICE VISIT CONVERTED (OUTPATIENT)
Dept: FAMILY MEDICINE CLINIC | Facility: CLINIC | Age: 65
End: 2020-01-30
Attending: NURSE PRACTITIONER

## 2020-01-30 ENCOUNTER — HOSPITAL ENCOUNTER (OUTPATIENT)
Dept: FAMILY MEDICINE CLINIC | Facility: CLINIC | Age: 65
Discharge: HOME | End: 2020-01-30
Attending: NURSE PRACTITIONER

## 2020-02-01 LAB — BACTERIA SPEC AEROBE CULT: NORMAL

## 2020-02-03 ENCOUNTER — HOSPITAL ENCOUNTER (OUTPATIENT)
Dept: OTHER | Facility: HOSPITAL | Age: 65
Discharge: HOME | End: 2020-02-03
Attending: INTERNAL MEDICINE

## 2020-02-13 ENCOUNTER — OFFICE VISIT CONVERTED (OUTPATIENT)
Dept: FAMILY MEDICINE CLINIC | Facility: CLINIC | Age: 65
End: 2020-02-13
Attending: NURSE PRACTITIONER

## 2020-02-13 ENCOUNTER — CONVERSION ENCOUNTER (OUTPATIENT)
Dept: FAMILY MEDICINE CLINIC | Facility: CLINIC | Age: 65
End: 2020-02-13

## 2020-02-13 RX ORDER — DOFETILIDE 0.5 MG/1
CAPSULE ORAL
Qty: 180 CAPSULE | Refills: 4 | Status: SHIPPED | OUTPATIENT
Start: 2020-02-13 | End: 2021-01-01

## 2020-02-20 ENCOUNTER — OFFICE VISIT CONVERTED (OUTPATIENT)
Dept: PULMONOLOGY | Facility: CLINIC | Age: 65
End: 2020-02-20
Attending: NURSE PRACTITIONER

## 2020-02-21 ENCOUNTER — CONVERSION ENCOUNTER (OUTPATIENT)
Dept: FAMILY MEDICINE CLINIC | Facility: CLINIC | Age: 65
End: 2020-02-21

## 2020-02-21 ENCOUNTER — HOSPITAL ENCOUNTER (OUTPATIENT)
Dept: OTHER | Facility: HOSPITAL | Age: 65
Discharge: HOME | End: 2020-02-21
Attending: NURSE PRACTITIONER

## 2020-02-21 ENCOUNTER — OFFICE VISIT CONVERTED (OUTPATIENT)
Dept: FAMILY MEDICINE CLINIC | Facility: CLINIC | Age: 65
End: 2020-02-21
Attending: FAMILY MEDICINE

## 2020-02-21 LAB
ALBUMIN SERPL-MCNC: 4.8 G/DL (ref 3.5–5)
ALBUMIN/GLOB SERPL: 1.7 {RATIO} (ref 1.4–2.6)
ALP SERPL-CCNC: 62 U/L (ref 43–160)
ALT SERPL-CCNC: 21 U/L (ref 10–40)
ANION GAP SERPL CALC-SCNC: 21 MMOL/L (ref 8–19)
AST SERPL-CCNC: 19 U/L (ref 15–50)
BASOPHILS # BLD AUTO: 0.04 10*3/UL (ref 0–0.2)
BASOPHILS NFR BLD AUTO: 0.4 % (ref 0–3)
BILIRUB SERPL-MCNC: 1.02 MG/DL (ref 0.2–1.3)
BUN SERPL-MCNC: 15 MG/DL (ref 5–25)
BUN/CREAT SERPL: 18 {RATIO} (ref 6–20)
CALCIUM SERPL-MCNC: 10.3 MG/DL (ref 8.7–10.4)
CHLORIDE SERPL-SCNC: 96 MMOL/L (ref 99–111)
CONV ABS IMM GRAN: 0.09 10*3/UL (ref 0–0.2)
CONV CO2: 25 MMOL/L (ref 22–32)
CONV IMMATURE GRAN: 0.8 % (ref 0–1.8)
CONV TOTAL PROTEIN: 7.7 G/DL (ref 6.3–8.2)
CREAT UR-MCNC: 0.82 MG/DL (ref 0.5–0.9)
DEPRECATED RDW RBC AUTO: 48.9 FL (ref 36.4–46.3)
EOSINOPHIL # BLD AUTO: 0.25 10*3/UL (ref 0–0.7)
EOSINOPHIL # BLD AUTO: 2.3 % (ref 0–7)
ERYTHROCYTE [DISTWIDTH] IN BLOOD BY AUTOMATED COUNT: 14.5 % (ref 11.7–14.4)
GFR SERPLBLD BASED ON 1.73 SQ M-ARVRAT: >60 ML/MIN/{1.73_M2}
GLOBULIN UR ELPH-MCNC: 2.9 G/DL (ref 2–3.5)
GLUCOSE SERPL-MCNC: 65 MG/DL (ref 65–99)
HCT VFR BLD AUTO: 49 % (ref 37–47)
HGB BLD-MCNC: 16.2 G/DL (ref 12–16)
IMMUNOCAP RESULT: NORMAL (ref 0–0)
LYMPHOCYTES # BLD AUTO: 2.59 10*3/UL (ref 1–5)
LYMPHOCYTES NFR BLD AUTO: 23.9 % (ref 20–45)
MCH RBC QN AUTO: 30.4 PG (ref 27–31)
MCHC RBC AUTO-ENTMCNC: 33.1 G/DL (ref 33–37)
MCV RBC AUTO: 91.9 FL (ref 81–99)
MONOCYTES # BLD AUTO: 1.34 10*3/UL (ref 0.2–1.2)
MONOCYTES NFR BLD AUTO: 12.4 % (ref 3–10)
NEUTROPHILS # BLD AUTO: 6.54 10*3/UL (ref 2–8)
NEUTROPHILS NFR BLD AUTO: 60.2 % (ref 30–85)
NRBC CBCN: 0 % (ref 0–0.7)
OSMOLALITY SERPL CALC.SUM OF ELEC: 285 MOSM/KG (ref 273–304)
PLATELET # BLD AUTO: 223 10*3/UL (ref 130–400)
PMV BLD AUTO: 10.4 FL (ref 9.4–12.3)
POTASSIUM SERPL-SCNC: 3.7 MMOL/L (ref 3.5–5.3)
RBC # BLD AUTO: 5.33 10*6/UL (ref 4.2–5.4)
SODIUM SERPL-SCNC: 138 MMOL/L (ref 135–147)
WBC # BLD AUTO: 10.85 10*3/UL (ref 4.8–10.8)

## 2020-02-22 LAB
A FUMIGATUS AB SER QL ID: <0.1 K[IU]/ML
CONV IMMUNOGLOBULIN G (IGG): 902 MG/DL (ref 700–1600)
CONV IMMUNOGLOBULIN M (IGM): 176 MG/DL (ref 26–217)
IGA SERPL-MCNC: 278 MG/DL (ref 87–352)
IGE SERPL-ACNC: 5 K[IU]/ML (ref 0–24)
IGE SERPL-ACNC: 5 K[IU]/ML (ref 0–24)

## 2020-02-23 LAB
BACTERIA SPEC AEROBE CULT: NORMAL
CONV IGG SUBCLASS 2: 264 MG/DL (ref 130–555)
CONV IGG SUBCLASS 3: 126 MG/DL (ref 15–102)
CONV IGG SUBCLASS 4: 5 MG/DL (ref 2–96)
CONV IMMUNOGLOBULIN G (IGG): 883 MG/DL (ref 700–1600)
IGG SUBCLASS 1: 461 MG/DL (ref 248–810)

## 2020-02-24 LAB — CONV ANTI NEUTROPHILIC CYTOPLASMIC AB W/REFLEX: NORMAL

## 2020-02-25 LAB
A FLAVUS IGE QN: NEGATIVE
A FLAVUS IGE QN: NEGATIVE
A NIGER AB SER QL: NEGATIVE
A NIGER AB SER QL: NEGATIVE
ASPERGILLUS GALACTOMMAN AG: 0.04 INDEX (ref 0–0.49)
B DERMAT AB TITR SER: NEGATIVE {TITER}
CONV ASPERGILLUS FUMIGATUS AB.: NEGATIVE
CONV ASPERGILLUS FUMIGATUS AB.: NEGATIVE
H CAPSUL AB TITR SER ID: NEGATIVE {TITER}

## 2020-02-26 ENCOUNTER — CLINICAL SUPPORT NO REQUIREMENTS (OUTPATIENT)
Dept: CARDIOLOGY | Facility: CLINIC | Age: 65
End: 2020-02-26

## 2020-02-26 ENCOUNTER — TELEPHONE (OUTPATIENT)
Dept: CARDIOLOGY | Facility: CLINIC | Age: 65
End: 2020-02-26

## 2020-02-26 DIAGNOSIS — I44.30 AV BLOCK: ICD-10-CM

## 2020-02-26 LAB
A FUMIGATUS1 AB SER-ACNC: NEGATIVE
A FUMIGATUS6 AB SER-ACNC: NEGATIVE
CONV AUREOBASIDIUM PULLULANS AB.: NEGATIVE
CONV MICROPOLYSPORA FAENI ABS.: NEGATIVE
CONV THERMOACTINOMYCES VULGARIS #1 ABS: NEGATIVE
H CAPSUL AG UR-ACNC: <0.5
PIGEON SERUM AB QL ID: NEGATIVE

## 2020-02-26 PROCEDURE — 93294 REM INTERROG EVL PM/LDLS PM: CPT | Performed by: INTERNAL MEDICINE

## 2020-02-26 PROCEDURE — 93296 REM INTERROG EVL PM/IDS: CPT | Performed by: INTERNAL MEDICINE

## 2020-02-27 ENCOUNTER — OFFICE VISIT CONVERTED (OUTPATIENT)
Dept: FAMILY MEDICINE CLINIC | Facility: CLINIC | Age: 65
End: 2020-02-27
Attending: NURSE PRACTITIONER

## 2020-02-27 ENCOUNTER — HOSPITAL ENCOUNTER (OUTPATIENT)
Dept: OTHER | Facility: HOSPITAL | Age: 65
Discharge: HOME | End: 2020-02-27
Attending: INTERNAL MEDICINE

## 2020-03-03 ENCOUNTER — HOSPITAL ENCOUNTER (OUTPATIENT)
Dept: GASTROENTEROLOGY | Facility: HOSPITAL | Age: 65
Setting detail: HOSPITAL OUTPATIENT SURGERY
Discharge: HOME | End: 2020-03-03
Attending: INTERNAL MEDICINE

## 2020-03-03 LAB
EPI CELLS NFR FLD: 1 %
JAK2 GENE QUAL: NORMAL
LYMPHOCYTES NFR FLD MANUAL: 34 %
MACROPHAGE FLUID: 24 /100{WBCS}
NEUTROPHILS NFR FLD MANUAL: 41 %
VISUAL PRESENCE OF BLOOD: NORMAL

## 2020-03-06 LAB
BACTERIA SPEC AEROBE CULT: NORMAL
CONV BRONCHIAL WASH CULTURE: NORMAL
CONV NOCARDIA STAIN (PARTIAL,MODIFIED ACID FAST): NORMAL

## 2020-03-20 ENCOUNTER — OFFICE VISIT CONVERTED (OUTPATIENT)
Dept: PULMONOLOGY | Facility: CLINIC | Age: 65
End: 2020-03-20
Attending: PHYSICIAN ASSISTANT

## 2020-03-30 ENCOUNTER — PATIENT OUTREACH - CONVERTED (OUTPATIENT)
Dept: FAMILY MEDICINE CLINIC | Facility: CLINIC | Age: 65
End: 2020-03-30
Attending: NURSE PRACTITIONER

## 2020-05-26 ENCOUNTER — OFFICE VISIT CONVERTED (OUTPATIENT)
Dept: FAMILY MEDICINE CLINIC | Facility: CLINIC | Age: 65
End: 2020-05-26
Attending: NURSE PRACTITIONER

## 2020-05-26 ENCOUNTER — HOSPITAL ENCOUNTER (OUTPATIENT)
Dept: FAMILY MEDICINE CLINIC | Facility: CLINIC | Age: 65
Discharge: HOME | End: 2020-05-26
Attending: NURSE PRACTITIONER

## 2020-05-27 ENCOUNTER — TELEPHONE (OUTPATIENT)
Dept: CARDIOLOGY | Facility: CLINIC | Age: 65
End: 2020-05-27

## 2020-05-27 LAB
ALBUMIN SERPL-MCNC: 5.2 G/DL (ref 3.5–5)
ALBUMIN/GLOB SERPL: 1.7 {RATIO} (ref 1.4–2.6)
ALP SERPL-CCNC: 69 U/L (ref 43–160)
ALT SERPL-CCNC: 14 U/L (ref 10–40)
AMPHETAMINES UR QL SCN: NEGATIVE
ANION GAP SERPL CALC-SCNC: 18 MMOL/L (ref 8–19)
AST SERPL-CCNC: 23 U/L (ref 15–50)
BARBITURATES UR QL SCN: NEGATIVE
BASOPHILS # BLD AUTO: 0.07 10*3/UL (ref 0–0.2)
BASOPHILS NFR BLD AUTO: 0.8 % (ref 0–3)
BENZODIAZ UR QL SCN: NEGATIVE
BILIRUB SERPL-MCNC: 1.04 MG/DL (ref 0.2–1.3)
BUN SERPL-MCNC: 13 MG/DL (ref 5–25)
BUN/CREAT SERPL: 16 {RATIO} (ref 6–20)
CALCIUM SERPL-MCNC: 10.6 MG/DL (ref 8.7–10.4)
CHLORIDE SERPL-SCNC: 101 MMOL/L (ref 99–111)
CHOLEST SERPL-MCNC: 101 MG/DL (ref 107–200)
CHOLEST/HDLC SERPL: 2.6 {RATIO} (ref 3–6)
CONV ABS IMM GRAN: 0.08 10*3/UL (ref 0–0.2)
CONV CO2: 24 MMOL/L (ref 22–32)
CONV COCAINE, UR: NEGATIVE
CONV IMMATURE GRAN: 1 % (ref 0–1.8)
CONV TOTAL PROTEIN: 8.2 G/DL (ref 6.3–8.2)
CREAT UR-MCNC: 0.79 MG/DL (ref 0.5–0.9)
DEPRECATED RDW RBC AUTO: 44.8 FL (ref 36.4–46.3)
EOSINOPHIL # BLD AUTO: 0.21 10*3/UL (ref 0–0.7)
EOSINOPHIL # BLD AUTO: 2.5 % (ref 0–7)
ERYTHROCYTE [DISTWIDTH] IN BLOOD BY AUTOMATED COUNT: 12.9 % (ref 11.7–14.4)
GFR SERPLBLD BASED ON 1.73 SQ M-ARVRAT: >60 ML/MIN/{1.73_M2}
GLOBULIN UR ELPH-MCNC: 3 G/DL (ref 2–3.5)
GLUCOSE SERPL-MCNC: 66 MG/DL (ref 65–99)
HCT VFR BLD AUTO: 47.8 % (ref 37–47)
HDLC SERPL-MCNC: 39 MG/DL (ref 40–60)
HGB BLD-MCNC: 15.2 G/DL (ref 12–16)
LDLC SERPL CALC-MCNC: 36 MG/DL (ref 70–100)
LYMPHOCYTES # BLD AUTO: 2.91 10*3/UL (ref 1–5)
LYMPHOCYTES NFR BLD AUTO: 34.8 % (ref 20–45)
MCH RBC QN AUTO: 29.8 PG (ref 27–31)
MCHC RBC AUTO-ENTMCNC: 31.8 G/DL (ref 33–37)
MCV RBC AUTO: 93.7 FL (ref 81–99)
METHADONE UR QL SCN: NEGATIVE
MONOCYTES # BLD AUTO: 1.01 10*3/UL (ref 0.2–1.2)
MONOCYTES NFR BLD AUTO: 12.1 % (ref 3–10)
NEUTROPHILS # BLD AUTO: 4.08 10*3/UL (ref 2–8)
NEUTROPHILS NFR BLD AUTO: 48.8 % (ref 30–85)
NRBC CBCN: 0 % (ref 0–0.7)
OPIATES TESTED UR SCN: NEGATIVE
OSMOLALITY SERPL CALC.SUM OF ELEC: 286 MOSM/KG (ref 273–304)
OXYCODONE UR QL SCN: NEGATIVE
PCP UR QL: NEGATIVE
PLATELET # BLD AUTO: 205 10*3/UL (ref 130–400)
PMV BLD AUTO: 10.9 FL (ref 9.4–12.3)
POTASSIUM SERPL-SCNC: 4 MMOL/L (ref 3.5–5.3)
RBC # BLD AUTO: 5.1 10*6/UL (ref 4.2–5.4)
SODIUM SERPL-SCNC: 139 MMOL/L (ref 135–147)
THC SERPLBLD CFM-MCNC: NEGATIVE NG/ML
TRIGL SERPL-MCNC: 129 MG/DL (ref 40–150)
TROPONIN T SERPL-MCNC: <0.01 NG/ML (ref 0–0.1)
TSH SERPL-ACNC: 2.33 M[IU]/L (ref 0.27–4.2)
VIT B12 SERPL-MCNC: 443 PG/ML (ref 211–911)
VLDLC SERPL-MCNC: 26 MG/DL (ref 5–37)
WBC # BLD AUTO: 8.36 10*3/UL (ref 4.8–10.8)

## 2020-05-29 LAB — SARS-COV-2 RNA SPEC QL NAA+PROBE: NOT DETECTED

## 2020-06-09 ENCOUNTER — OFFICE VISIT CONVERTED (OUTPATIENT)
Dept: PULMONOLOGY | Facility: CLINIC | Age: 65
End: 2020-06-09
Attending: INTERNAL MEDICINE

## 2020-06-10 ENCOUNTER — HOSPITAL ENCOUNTER (OUTPATIENT)
Dept: OTHER | Facility: HOSPITAL | Age: 65
Discharge: HOME | End: 2020-06-10
Attending: NURSE PRACTITIONER

## 2020-06-19 ENCOUNTER — CONVERSION ENCOUNTER (OUTPATIENT)
Dept: CARDIOLOGY | Facility: CLINIC | Age: 65
End: 2020-06-19
Attending: INTERNAL MEDICINE

## 2020-06-19 ENCOUNTER — CONVERSION ENCOUNTER (OUTPATIENT)
Dept: OTHER | Facility: HOSPITAL | Age: 65
End: 2020-06-19

## 2020-07-07 ENCOUNTER — OFFICE VISIT CONVERTED (OUTPATIENT)
Dept: FAMILY MEDICINE CLINIC | Facility: CLINIC | Age: 65
End: 2020-07-07
Attending: NURSE PRACTITIONER

## 2020-07-07 ENCOUNTER — HOSPITAL ENCOUNTER (OUTPATIENT)
Dept: FAMILY MEDICINE CLINIC | Facility: CLINIC | Age: 65
Discharge: HOME | End: 2020-07-07
Attending: NURSE PRACTITIONER

## 2020-07-08 LAB — MAGNESIUM SERPL-MCNC: 2.22 MG/DL (ref 1.6–2.3)

## 2020-07-09 ENCOUNTER — HOSPITAL ENCOUNTER (OUTPATIENT)
Dept: OTHER | Facility: HOSPITAL | Age: 65
Discharge: HOME | End: 2020-07-09
Attending: NURSE PRACTITIONER

## 2020-07-29 ENCOUNTER — PATIENT OUTREACH - CONVERTED (OUTPATIENT)
Dept: FAMILY MEDICINE CLINIC | Facility: CLINIC | Age: 65
End: 2020-07-29
Attending: NURSE PRACTITIONER

## 2020-07-30 RX ORDER — FUROSEMIDE 40 MG/1
TABLET ORAL
Qty: 90 TABLET | Refills: 3 | Status: SHIPPED | OUTPATIENT
Start: 2020-07-30 | End: 2021-01-01 | Stop reason: SDUPTHER

## 2020-08-07 NOTE — PROGRESS NOTES
[facility] - Office Note  Deb Harrison         [address] [place] [address]  1955   [phone] (home)      LOCATION:  [facility] office.  Visit Type: Follow Up.    PCP:  Siri Bain APRN  Primary cardiologist: Dr. Lucero    08/11/20   Deb Harrison is a 65 y.o.  female  retired.      Chief Complaint: FU on AT, VHDz, Pulm HTN    Problem List/PMHx:   1.  Persistent Atrial Flutter/ Paroxysmal Atrial Tachycardia   A.  EP study, November 2003, at OSH with no RFA performed.              B. Sotalol initiated, November 2003.              C. Recurrent, persistent atrial flutter with rapid ventricular rate and 1-to-1 AV conduction at 240 BPM.              D. EP study, 04/22/2004, with radiofrequency ablation of atrial flutter around the ASD patch, radiofrequency ablation of isthmus dependent counter clockwise atrial flutter, radiofrequency ablation of right atrial incisional scar flutter and a fourth atrial flutter which  was possibly left atrial in origin and was not mapped nor ablated.              E.  Recurrent atrial flutter, June 2004. Repeat EP study with successful radiofrequency ablation of a right atrial scar related flutter along the anterolateral wall. Sinus node dysfunction was also demonstrated during that study while she was on both on IV Diltiazem  and Digoxin.              G.  Echocardiogram, 10/03/2007, with EF 55% to 60%, severe TR, mitral valve area of 1.8 cm² consistent with mild mitral stenosis, LA 4.5.             H. Implantation of a dual chamber permanent pacemaker, 02/25/2008 (Medtronic device).             I. EP study 7/31/2008 with radiofrequency ablation of atrial tachycardia from the high anterolateral atrial wall with conduction of left atrial flutter, not mapped or ablated.              J. Rythmol increased, 07/31/2008             K. Hospitalization, 11/13/2014 at [facility] for recurrent atrial  tachycardia/flutter treated with cardioversion. Continuation of Norvasc and titration of diltiazem as well as addition of Eliquis therapy.             I. Readmission to [facility] in [place], [place] emergency room secondary to palpitations and syncope on 12/24/2014 with device interrogation suggesting possible SVT versus ventricular tachycardia.              M. EPS study with RFA for atrial tachycardia, 01/16/2015.              N. Tikosyn initiated 01/16/2015             O. RFA AV Node 4/10/16 and RFA of AT and RA scar flutter  2.  Pulmonary Hypertension/TR             A. DELIA, 04/20/2004 with moderate tricuspid regurgitation, mild holosystolic mitral valve prolapse, marked right atrial enlargement at approximately 7-8 cm, normal LV size and function with LVEF (72%), right enlarged ventricular size and diminished systolic  function.   B.  Echocardiogram, 02/01/2006: EF (70%), moderately dilated RV, mild TR, mild pulmonary hypertension.   C.  Echocardiogram in October 2007: Mild MS, moderate RV enlargement, moderate to severe tricuspid regurgitation, normal LV size and function, RVSP of 45 mmHg.            D. Right heart cath, 08/01/2008 with pulmonary artery pressure of 48/22, RV 53/20, cardiac output 5, cardiac index 2/8, wedge 26.   E. Echocardiogram, April 2009: LVEF (50% to 55%), severe right ventricular enlargement,  moderate to severe tricuspid insufficiency, mild to moderate MR, RVSP estimated to be 40-50 mmHg.   F.  Echocardiogram, 02/23/2012, with ejection fraction of 50% to 55%, LAE at 4 cm, mild MS with mild restriction of the mitral valve leaflet, prolapse of the posterior leaflet, mean gradient of 3.5 mmHg, mild TR with an RVSP of 31.  G  Echocardiogram, 04/23/2014, revealing EF 45% to 50% with diastolic dysfunction, mild MR, Mild TR. RVSP 42.   H. Echo 2/29/16: LVEF NL, mild TR, mild -mod MR, RV severely dilated   I.  Echocardiogram 3/1/18: EF 66-70%, moderate TR             J.  DELIA  3/22/18: NL LVEF, Moderate MR, Mod TR, severe RV enlargement             K.  R/l HEART CATH 8/18 normal coronary arteries, normal LVEF with mild elevated LV pressure, normal pulmonary artery pressure, moderately elevated right atrial pressure, no evidence of MS or MR; felt previous ASD responsible for progressive RV failure.  3.  ASD (Atrial Septal Defect), s/p repair age 6.    4.  AVB   A.  S/P MDT dual chamber PPM 2/25/2008   B.  PM generator change out 3/2/17         Allergies   Allergen Reactions   • Adenosine Nausea Only   • Codeine Nausea Only and Dizziness         Current Outpatient Medications:   •  apixaban (ELIQUIS) 5 MG tablet tablet, Take 1 tablet by mouth 2 (Two) Times a Day., Disp: 180 tablet, Rfl: 3  •  Cholecalciferol (VITAMIN D3) 50 MCG (2000 UT) capsule, Take 2,000 Units by mouth Daily., Disp: , Rfl:   •  dofetilide (TIKOSYN) 500 MCG capsule, TAKE 1 CAPSULE TWICE A DAY, Disp: 180 capsule, Rfl: 4  •  furosemide (LASIX) 40 MG tablet, TAKE 1 TABLET DAILY, Disp: 90 tablet, Rfl: 3  •  isavuconazonium (Cresemba) 186 MG capsule capsule, Take 372 mg by mouth., Disp: , Rfl:   •  levothyroxine (SYNTHROID, LEVOTHROID) 75 MCG tablet, Take 75 mcg by mouth Daily., Disp: , Rfl: 5  •  linaclotide (LINZESS) 145 MCG capsule capsule, Take 145 mcg by mouth Every Morning Before Breakfast., Disp: , Rfl:   •  metoprolol tartrate (LOPRESSOR) 25 MG tablet, Take 1 tablet by mouth Daily., Disp: 90 tablet, Rfl: 4  •  rosuvastatin (CRESTOR) 20 MG tablet, Take 20 mg by mouth Every Night., Disp: , Rfl:   •  spironolactone (ALDACTONE) 50 MG tablet, TAKE 1 TABLET BY MOUTH EVERY DAY, Disp: 90 tablet, Rfl: 3  •  temazepam (RESTORIL) 30 MG capsule, Take 30 mg by mouth at night as needed for sleep., Disp: , Rfl:   •  umeclidinium-vilanterol (ANORO ELLIPTA) 62.5-25 MCG/INH aerosol powder  inhaler, Inhale 1 puff Daily., Disp: , Rfl:     HPI            The following portions of the patient's history were reviewed in the chart and updated  "as appropriate: allergies, current medications, past family history, past medical history, past social history, past surgical history and problem list.    Review of Systems   Constitution: Negative for fever and malaise/fatigue.   Cardiovascular: Positive for irregular heartbeat and palpitations. Negative for chest pain, dyspnea on exertion and leg swelling.   Respiratory: Positive for shortness of breath. Negative for cough, hemoptysis and wheezing.    Hematologic/Lymphatic: Negative for bleeding problem. Does not bruise/bleed easily.   Musculoskeletal: Positive for arthritis.   All other systems reviewed and are negative.            height is 162.6 cm (64\") and weight is 81.6 kg (180 lb). Her blood pressure is 112/76 and her pulse is 81.   Physical Exam   Constitutional: Vital signs are normal. She appears well-developed and well-nourished.   Cardiovascular: Normal rate, S1 normal, S2 normal, normal heart sounds, intact distal pulses and normal pulses. An irregular rhythm present.   Pulmonary/Chest: Effort normal and breath sounds normal. She has no wheezes. She has no rhonchi. She has no rales.   Abdominal: Soft. Normal appearance and bowel sounds are normal. There is no hepatosplenomegaly.   Neurological: She is alert.           ECG 12 Lead  Date/Time: 8/27/2020 1:52 PM  Performed by: Siri Rosado PA  Authorized by: Siri Rosado PA   Comparison: compared with previous ECG from 1/14/2020  Similar to previous ECG  Rhythm: atrial fibrillation  Rate: normal  QRS axis: normal    Clinical impression: abnormal EKG           Medtronic dual-chamber pacemaker, programmed DDDR base rate 70.  Atrial pacing 51.9%, P wave 1, threshold 0.62, impedance 468 ohms.  RV pacing 99% with no R wave at 30 bpm.  Threshold 0.75, impedance 457 ohms.  Battery voltage 2.79 which is approximately 7-1/2 years.  She is 1% A. fib burden with the longest episode being 1 hour 26 minutes.  She did have one high ventricular rate episode " longest 6 seconds on May 5, 2020.  She has a new home monitor but has not set it up with our office quite yet  Assessment/ Plan     Atrial tachycardia (CMS/HCC): EKG performed and reviewed today.  Underlying A. fib with AV pacing.  Heart rate 81 bpm.  By device interrogation, 1% A. tach A. fib burden.  Continue to monitor and continue with metoprolol.  Refills will be provided.    AV block: Pacemaker interrogation performed today and noted above.  Interrogation is essentially within normal limits.  Return to clinic 6 months with repeat Medtronic interrogation or sooner PRN.    Pulmonary hypertension (CMS/HCC): She has a pulmonologist with whom she follows routinely.  She now has a general cardiologist, Dr. Lucero, who works in conjunction with her pulmonologist.  They have started her on Cresemba which seems to be helping some.  Continue to monitor.    Atrial flutter, unspecified type (CMS/HCC): Remote flutter ablation 2004.  No atrial flutter noted by device interrogation.  EKG performed and reviewed today.  By EKG and physical exam she does appear to be in A. fib currently.  Continue to monitor.              Siri Rosado PA-C functioning independently  8/11/2020 11:10

## 2020-08-11 ENCOUNTER — OFFICE VISIT (OUTPATIENT)
Dept: CARDIOLOGY | Facility: CLINIC | Age: 65
End: 2020-08-11

## 2020-08-11 VITALS
DIASTOLIC BLOOD PRESSURE: 76 MMHG | SYSTOLIC BLOOD PRESSURE: 112 MMHG | BODY MASS INDEX: 30.73 KG/M2 | WEIGHT: 180 LBS | HEART RATE: 81 BPM | HEIGHT: 64 IN

## 2020-08-11 DIAGNOSIS — I47.1 ATRIAL TACHYCARDIA (HCC): Primary | ICD-10-CM

## 2020-08-11 DIAGNOSIS — I44.30 AV BLOCK: ICD-10-CM

## 2020-08-11 DIAGNOSIS — I27.20 PULMONARY HYPERTENSION (HCC): ICD-10-CM

## 2020-08-11 DIAGNOSIS — I48.92 ATRIAL FLUTTER, UNSPECIFIED TYPE (HCC): ICD-10-CM

## 2020-08-11 PROCEDURE — 99214 OFFICE O/P EST MOD 30 MIN: CPT | Performed by: PHYSICIAN ASSISTANT

## 2020-08-11 PROCEDURE — 93000 ELECTROCARDIOGRAM COMPLETE: CPT | Performed by: PHYSICIAN ASSISTANT

## 2020-08-11 PROCEDURE — 93280 PM DEVICE PROGR EVAL DUAL: CPT | Performed by: PHYSICIAN ASSISTANT

## 2020-08-11 RX ORDER — ACETAMINOPHEN 160 MG
2000 TABLET,DISINTEGRATING ORAL DAILY
COMMUNITY

## 2020-08-12 ENCOUNTER — OFFICE VISIT CONVERTED (OUTPATIENT)
Dept: PULMONOLOGY | Facility: CLINIC | Age: 65
End: 2020-08-12
Attending: INTERNAL MEDICINE

## 2020-08-25 ENCOUNTER — HOSPITAL ENCOUNTER (OUTPATIENT)
Dept: FAMILY MEDICINE CLINIC | Facility: CLINIC | Age: 65
Discharge: HOME | End: 2020-08-25
Attending: NURSE PRACTITIONER

## 2020-08-25 ENCOUNTER — OFFICE VISIT CONVERTED (OUTPATIENT)
Dept: FAMILY MEDICINE CLINIC | Facility: CLINIC | Age: 65
End: 2020-08-25
Attending: NURSE PRACTITIONER

## 2020-08-25 LAB
CALCIUM SERPL-MCNC: 10.4 MG/DL (ref 8.7–10.4)
TSH SERPL-ACNC: 2.35 M[IU]/L (ref 0.27–4.2)

## 2020-08-28 LAB — PTH-INTACT SERPL-MCNC: 49.2 PG/ML (ref 11.1–79.5)

## 2020-10-01 ENCOUNTER — OFFICE VISIT CONVERTED (OUTPATIENT)
Dept: FAMILY MEDICINE CLINIC | Facility: CLINIC | Age: 65
End: 2020-10-01
Attending: NURSE PRACTITIONER

## 2020-10-23 ENCOUNTER — HOSPITAL ENCOUNTER (OUTPATIENT)
Dept: OTHER | Facility: HOSPITAL | Age: 65
Discharge: HOME | End: 2020-10-23
Attending: NURSE PRACTITIONER

## 2020-10-23 LAB
ALBUMIN SERPL-MCNC: 4.6 G/DL (ref 3.5–5)
ALBUMIN/GLOB SERPL: 1.6 {RATIO} (ref 1.4–2.6)
ALP SERPL-CCNC: 71 U/L (ref 43–160)
ALT SERPL-CCNC: 15 U/L (ref 10–40)
ANION GAP SERPL CALC-SCNC: 17 MMOL/L (ref 8–19)
AST SERPL-CCNC: 22 U/L (ref 15–50)
BILIRUB SERPL-MCNC: 0.43 MG/DL (ref 0.2–1.3)
BUN SERPL-MCNC: 14 MG/DL (ref 5–25)
BUN/CREAT SERPL: 15 {RATIO} (ref 6–20)
CALCIUM SERPL-MCNC: 10.1 MG/DL (ref 8.7–10.4)
CHLORIDE SERPL-SCNC: 104 MMOL/L (ref 99–111)
CONV CO2: 24 MMOL/L (ref 22–32)
CONV TOTAL PROTEIN: 7.5 G/DL (ref 6.3–8.2)
CREAT UR-MCNC: 0.94 MG/DL (ref 0.5–0.9)
GFR SERPLBLD BASED ON 1.73 SQ M-ARVRAT: >60 ML/MIN/{1.73_M2}
GLOBULIN UR ELPH-MCNC: 2.9 G/DL (ref 2–3.5)
GLUCOSE SERPL-MCNC: 90 MG/DL (ref 65–99)
OSMOLALITY SERPL CALC.SUM OF ELEC: 292 MOSM/KG (ref 273–304)
POTASSIUM SERPL-SCNC: 4.2 MMOL/L (ref 3.5–5.3)
SODIUM SERPL-SCNC: 141 MMOL/L (ref 135–147)

## 2020-10-29 ENCOUNTER — OFFICE VISIT CONVERTED (OUTPATIENT)
Dept: PULMONOLOGY | Facility: CLINIC | Age: 65
End: 2020-10-29
Attending: NURSE PRACTITIONER

## 2020-11-09 RX ORDER — SPIRONOLACTONE 50 MG/1
TABLET, FILM COATED ORAL
Qty: 90 TABLET | Refills: 3 | Status: SHIPPED | OUTPATIENT
Start: 2020-11-09 | End: 2021-01-01

## 2020-11-18 ENCOUNTER — OFFICE VISIT CONVERTED (OUTPATIENT)
Dept: FAMILY MEDICINE CLINIC | Facility: CLINIC | Age: 65
End: 2020-11-18
Attending: NURSE PRACTITIONER

## 2020-11-18 ENCOUNTER — HOSPITAL ENCOUNTER (OUTPATIENT)
Dept: FAMILY MEDICINE CLINIC | Facility: CLINIC | Age: 65
Discharge: HOME | End: 2020-11-18
Attending: NURSE PRACTITIONER

## 2020-11-22 LAB — SARS-COV-2 RNA SPEC QL NAA+PROBE: DETECTED

## 2020-12-10 ENCOUNTER — OFFICE VISIT CONVERTED (OUTPATIENT)
Dept: FAMILY MEDICINE CLINIC | Facility: CLINIC | Age: 65
End: 2020-12-10
Attending: NURSE PRACTITIONER

## 2020-12-10 ENCOUNTER — HOSPITAL ENCOUNTER (OUTPATIENT)
Dept: FAMILY MEDICINE CLINIC | Facility: CLINIC | Age: 65
Discharge: HOME | End: 2020-12-10
Attending: NURSE PRACTITIONER

## 2020-12-16 ENCOUNTER — TELEPHONE (OUTPATIENT)
Dept: CARDIOLOGY | Facility: CLINIC | Age: 65
End: 2020-12-16

## 2020-12-17 RX ORDER — APIXABAN 5 MG/1
TABLET, FILM COATED ORAL
Qty: 180 TABLET | Refills: 3 | Status: SHIPPED | OUTPATIENT
Start: 2020-12-17 | End: 2021-01-01 | Stop reason: SDUPTHER

## 2020-12-21 ENCOUNTER — HOSPITAL ENCOUNTER (OUTPATIENT)
Dept: FAMILY MEDICINE CLINIC | Facility: CLINIC | Age: 65
Discharge: HOME | End: 2020-12-21
Attending: NURSE PRACTITIONER

## 2020-12-21 LAB
ALBUMIN SERPL-MCNC: 4.2 G/DL (ref 3.5–5)
ALBUMIN/GLOB SERPL: 1.4 {RATIO} (ref 1.4–2.6)
ALP SERPL-CCNC: 74 U/L (ref 43–160)
ALT SERPL-CCNC: 20 U/L (ref 10–40)
ANION GAP SERPL CALC-SCNC: 13 MMOL/L (ref 8–19)
AST SERPL-CCNC: 21 U/L (ref 15–50)
BILIRUB SERPL-MCNC: 0.53 MG/DL (ref 0.2–1.3)
BUN SERPL-MCNC: 16 MG/DL (ref 5–25)
BUN/CREAT SERPL: 18 {RATIO} (ref 6–20)
CALCIUM SERPL-MCNC: 9.5 MG/DL (ref 8.7–10.4)
CHLORIDE SERPL-SCNC: 103 MMOL/L (ref 99–111)
CONV CO2: 28 MMOL/L (ref 22–32)
CONV TOTAL PROTEIN: 7.2 G/DL (ref 6.3–8.2)
CREAT UR-MCNC: 0.88 MG/DL (ref 0.5–0.9)
GFR SERPLBLD BASED ON 1.73 SQ M-ARVRAT: >60 ML/MIN/{1.73_M2}
GLOBULIN UR ELPH-MCNC: 3 G/DL (ref 2–3.5)
GLUCOSE SERPL-MCNC: 83 MG/DL (ref 65–99)
OSMOLALITY SERPL CALC.SUM OF ELEC: 290 MOSM/KG (ref 273–304)
POTASSIUM SERPL-SCNC: 4.2 MMOL/L (ref 3.5–5.3)
SODIUM SERPL-SCNC: 140 MMOL/L (ref 135–147)

## 2020-12-29 ENCOUNTER — OFFICE VISIT CONVERTED (OUTPATIENT)
Dept: FAMILY MEDICINE CLINIC | Facility: CLINIC | Age: 65
End: 2020-12-29
Attending: NURSE PRACTITIONER

## 2021-01-01 ENCOUNTER — OFFICE VISIT (OUTPATIENT)
Dept: FAMILY MEDICINE CLINIC | Facility: CLINIC | Age: 66
End: 2021-01-01

## 2021-01-01 ENCOUNTER — ANESTHESIA EVENT (OUTPATIENT)
Dept: PERIOP | Facility: HOSPITAL | Age: 66
End: 2021-01-01

## 2021-01-01 ENCOUNTER — PREP FOR SURGERY (OUTPATIENT)
Dept: OTHER | Facility: HOSPITAL | Age: 66
End: 2021-01-01

## 2021-01-01 ENCOUNTER — OFFICE VISIT CONVERTED (OUTPATIENT)
Dept: PULMONOLOGY | Facility: CLINIC | Age: 66
End: 2021-01-01
Attending: NURSE PRACTITIONER

## 2021-01-01 ENCOUNTER — TELEPHONE (OUTPATIENT)
Dept: CARDIOLOGY | Facility: CLINIC | Age: 66
End: 2021-01-01

## 2021-01-01 ENCOUNTER — CONVERSION ENCOUNTER (OUTPATIENT)
Dept: FAMILY MEDICINE CLINIC | Facility: CLINIC | Age: 66
End: 2021-01-01

## 2021-01-01 ENCOUNTER — HOSPITAL ENCOUNTER (EMERGENCY)
Facility: HOSPITAL | Age: 66
Discharge: HOME | End: 2021-12-06
Attending: EMERGENCY MEDICINE | Admitting: EMERGENCY MEDICINE

## 2021-01-01 ENCOUNTER — HOSPITAL ENCOUNTER (OUTPATIENT)
Dept: CARDIOLOGY | Facility: HOSPITAL | Age: 66
Discharge: HOME | End: 2021-12-21
Admitting: NURSE PRACTITIONER

## 2021-01-01 ENCOUNTER — HOSPITAL ENCOUNTER (EMERGENCY)
Facility: HOSPITAL | Age: 66
Discharge: HOME | End: 2021-12-04
Attending: EMERGENCY MEDICINE | Admitting: EMERGENCY MEDICINE

## 2021-01-01 ENCOUNTER — HOSPITAL ENCOUNTER (OUTPATIENT)
Dept: GENERAL RADIOLOGY | Facility: HOSPITAL | Age: 66
Discharge: HOME | End: 2021-09-10
Admitting: NURSE PRACTITIONER

## 2021-01-01 ENCOUNTER — PRE-ADMISSION TESTING (OUTPATIENT)
Dept: PREADMISSION TESTING | Facility: HOSPITAL | Age: 66
End: 2021-01-01

## 2021-01-01 ENCOUNTER — HOSPITAL ENCOUNTER (OUTPATIENT)
Facility: HOSPITAL | Age: 66
Discharge: HOME | End: 2021-11-27
Attending: ORTHOPAEDIC SURGERY | Admitting: ORTHOPAEDIC SURGERY

## 2021-01-01 ENCOUNTER — OFFICE VISIT CONVERTED (OUTPATIENT)
Dept: FAMILY MEDICINE CLINIC | Facility: CLINIC | Age: 66
End: 2021-01-01
Attending: NURSE PRACTITIONER

## 2021-01-01 ENCOUNTER — HOSPITAL ENCOUNTER (OUTPATIENT)
Dept: OTHER | Facility: HOSPITAL | Age: 66
Discharge: HOME | End: 2021-04-30
Attending: NURSE PRACTITIONER

## 2021-01-01 ENCOUNTER — OFFICE VISIT (OUTPATIENT)
Dept: CARDIOLOGY | Facility: CLINIC | Age: 66
End: 2021-01-01

## 2021-01-01 ENCOUNTER — OFFICE VISIT (OUTPATIENT)
Dept: PULMONOLOGY | Facility: CLINIC | Age: 66
End: 2021-01-01

## 2021-01-01 ENCOUNTER — OFFICE VISIT (OUTPATIENT)
Dept: NEUROSURGERY | Facility: CLINIC | Age: 66
End: 2021-01-01

## 2021-01-01 ENCOUNTER — HOSPITAL ENCOUNTER (OUTPATIENT)
Dept: CARDIOLOGY | Facility: HOSPITAL | Age: 66
Discharge: HOME | End: 2021-10-21
Admitting: INTERNAL MEDICINE

## 2021-01-01 ENCOUNTER — HOSPITAL ENCOUNTER (OUTPATIENT)
Dept: FAMILY MEDICINE CLINIC | Facility: CLINIC | Age: 66
Discharge: HOME | End: 2021-03-31
Attending: NURSE PRACTITIONER

## 2021-01-01 ENCOUNTER — HOSPITAL ENCOUNTER (OUTPATIENT)
Dept: OTHER | Facility: HOSPITAL | Age: 66
Discharge: HOME | End: 2021-02-03
Attending: NURSE PRACTITIONER

## 2021-01-01 ENCOUNTER — TELEPHONE (OUTPATIENT)
Dept: PULMONOLOGY | Facility: CLINIC | Age: 66
End: 2021-01-01

## 2021-01-01 ENCOUNTER — HOSPITAL ENCOUNTER (OUTPATIENT)
Dept: FAMILY MEDICINE CLINIC | Facility: CLINIC | Age: 66
Discharge: HOME | End: 2021-02-22
Attending: NURSE PRACTITIONER

## 2021-01-01 ENCOUNTER — OFFICE VISIT (OUTPATIENT)
Dept: ORTHOPEDIC SURGERY | Facility: CLINIC | Age: 66
End: 2021-01-01

## 2021-01-01 ENCOUNTER — TELEPHONE (OUTPATIENT)
Dept: ORTHOPEDIC SURGERY | Facility: CLINIC | Age: 66
End: 2021-01-01

## 2021-01-01 ENCOUNTER — APPOINTMENT (OUTPATIENT)
Dept: GENERAL RADIOLOGY | Facility: HOSPITAL | Age: 66
End: 2021-01-01

## 2021-01-01 ENCOUNTER — ANESTHESIA (OUTPATIENT)
Dept: PERIOP | Facility: HOSPITAL | Age: 66
End: 2021-01-01

## 2021-01-01 ENCOUNTER — HOSPITAL ENCOUNTER (OUTPATIENT)
Dept: FAMILY MEDICINE CLINIC | Facility: CLINIC | Age: 66
Discharge: HOME | End: 2021-05-26
Attending: NURSE PRACTITIONER

## 2021-01-01 ENCOUNTER — TRANSCRIBE ORDERS (OUTPATIENT)
Dept: PULMONOLOGY | Facility: CLINIC | Age: 66
End: 2021-01-01

## 2021-01-01 ENCOUNTER — HOSPITAL ENCOUNTER (OUTPATIENT)
Dept: OTHER | Facility: HOSPITAL | Age: 66
Discharge: HOME | End: 2021-02-24
Attending: NURSE PRACTITIONER

## 2021-01-01 ENCOUNTER — TELEPHONE (OUTPATIENT)
Dept: NEUROSURGERY | Facility: CLINIC | Age: 66
End: 2021-01-01

## 2021-01-01 ENCOUNTER — HOSPITAL ENCOUNTER (OUTPATIENT)
Dept: CT IMAGING | Facility: HOSPITAL | Age: 66
Discharge: HOME | End: 2021-06-21
Admitting: NURSE PRACTITIONER

## 2021-01-01 ENCOUNTER — OFFICE VISIT CONVERTED (OUTPATIENT)
Dept: CARDIOLOGY | Facility: CLINIC | Age: 66
End: 2021-01-01
Attending: INTERNAL MEDICINE

## 2021-01-01 ENCOUNTER — APPOINTMENT (OUTPATIENT)
Dept: CARDIOLOGY | Facility: HOSPITAL | Age: 66
End: 2021-01-01

## 2021-01-01 VITALS
BODY MASS INDEX: 30.22 KG/M2 | RESPIRATION RATE: 15 BRPM | OXYGEN SATURATION: 96 % | OXYGEN SATURATION: 96 % | SYSTOLIC BLOOD PRESSURE: 112 MMHG | RESPIRATION RATE: 15 BRPM | HEART RATE: 88 BPM | DIASTOLIC BLOOD PRESSURE: 70 MMHG | SYSTOLIC BLOOD PRESSURE: 100 MMHG | HEIGHT: 64 IN | TEMPERATURE: 97.6 F | DIASTOLIC BLOOD PRESSURE: 60 MMHG | HEIGHT: 64 IN | HEART RATE: 74 BPM | WEIGHT: 177 LBS | WEIGHT: 180 LBS | TEMPERATURE: 98 F | BODY MASS INDEX: 30.73 KG/M2

## 2021-01-01 VITALS
RESPIRATION RATE: 18 BRPM | BODY MASS INDEX: 30.39 KG/M2 | SYSTOLIC BLOOD PRESSURE: 118 MMHG | WEIGHT: 178 LBS | TEMPERATURE: 97.4 F | HEART RATE: 86 BPM | OXYGEN SATURATION: 92 % | DIASTOLIC BLOOD PRESSURE: 67 MMHG | HEIGHT: 64 IN

## 2021-01-01 VITALS
HEIGHT: 63 IN | SYSTOLIC BLOOD PRESSURE: 132 MMHG | DIASTOLIC BLOOD PRESSURE: 80 MMHG | HEART RATE: 91 BPM | OXYGEN SATURATION: 94 % | TEMPERATURE: 97.6 F | BODY MASS INDEX: 31.71 KG/M2 | WEIGHT: 179 LBS

## 2021-01-01 VITALS
BODY MASS INDEX: 31.25 KG/M2 | SYSTOLIC BLOOD PRESSURE: 128 MMHG | OXYGEN SATURATION: 94 % | TEMPERATURE: 96.9 F | HEART RATE: 88 BPM | DIASTOLIC BLOOD PRESSURE: 74 MMHG | WEIGHT: 176.37 LBS | HEIGHT: 63 IN

## 2021-01-01 VITALS
DIASTOLIC BLOOD PRESSURE: 70 MMHG | BODY MASS INDEX: 30.24 KG/M2 | SYSTOLIC BLOOD PRESSURE: 128 MMHG | HEIGHT: 64 IN | WEIGHT: 177.12 LBS | TEMPERATURE: 97 F | HEART RATE: 90 BPM | OXYGEN SATURATION: 94 %

## 2021-01-01 VITALS
DIASTOLIC BLOOD PRESSURE: 72 MMHG | OXYGEN SATURATION: 94 % | TEMPERATURE: 97.9 F | SYSTOLIC BLOOD PRESSURE: 130 MMHG | HEART RATE: 91 BPM | HEIGHT: 63 IN | WEIGHT: 179.06 LBS | BODY MASS INDEX: 31.73 KG/M2

## 2021-01-01 VITALS
OXYGEN SATURATION: 98 % | DIASTOLIC BLOOD PRESSURE: 78 MMHG | BODY MASS INDEX: 32.7 KG/M2 | SYSTOLIC BLOOD PRESSURE: 132 MMHG | WEIGHT: 184.56 LBS | TEMPERATURE: 98 F | HEART RATE: 93 BPM | HEIGHT: 63 IN

## 2021-01-01 VITALS
HEIGHT: 64 IN | HEART RATE: 88 BPM | DIASTOLIC BLOOD PRESSURE: 82 MMHG | OXYGEN SATURATION: 97 % | WEIGHT: 182.5 LBS | BODY MASS INDEX: 31.16 KG/M2 | TEMPERATURE: 97.7 F | SYSTOLIC BLOOD PRESSURE: 120 MMHG

## 2021-01-01 VITALS
HEART RATE: 78 BPM | DIASTOLIC BLOOD PRESSURE: 62 MMHG | HEIGHT: 64 IN | WEIGHT: 175 LBS | BODY MASS INDEX: 29.88 KG/M2 | TEMPERATURE: 98.3 F | SYSTOLIC BLOOD PRESSURE: 111 MMHG | OXYGEN SATURATION: 95 % | RESPIRATION RATE: 14 BRPM

## 2021-01-01 VITALS
OXYGEN SATURATION: 98 % | WEIGHT: 173.56 LBS | RESPIRATION RATE: 16 BRPM | TEMPERATURE: 97.9 F | DIASTOLIC BLOOD PRESSURE: 80 MMHG | BODY MASS INDEX: 30.6 KG/M2 | SYSTOLIC BLOOD PRESSURE: 134 MMHG | HEIGHT: 64 IN | HEART RATE: 49 BPM | SYSTOLIC BLOOD PRESSURE: 120 MMHG | HEIGHT: 64 IN | RESPIRATION RATE: 12 BRPM | TEMPERATURE: 98.1 F | HEIGHT: 64 IN | HEART RATE: 89 BPM | WEIGHT: 181.25 LBS | HEART RATE: 56 BPM | DIASTOLIC BLOOD PRESSURE: 81 MMHG | SYSTOLIC BLOOD PRESSURE: 102 MMHG | SYSTOLIC BLOOD PRESSURE: 124 MMHG | WEIGHT: 182.5 LBS | WEIGHT: 180.25 LBS | TEMPERATURE: 97.8 F | TEMPERATURE: 98.7 F | TEMPERATURE: 98 F | WEIGHT: 173.31 LBS | RESPIRATION RATE: 16 BRPM | BODY MASS INDEX: 30.94 KG/M2 | OXYGEN SATURATION: 94 % | HEIGHT: 64 IN | OXYGEN SATURATION: 96 % | TEMPERATURE: 98 F | TEMPERATURE: 97.9 F | OXYGEN SATURATION: 90 % | HEART RATE: 96 BPM | HEIGHT: 64 IN | DIASTOLIC BLOOD PRESSURE: 55 MMHG | BODY MASS INDEX: 31.16 KG/M2 | HEIGHT: 64 IN | WEIGHT: 180.56 LBS | HEART RATE: 93 BPM | BODY MASS INDEX: 30.83 KG/M2 | DIASTOLIC BLOOD PRESSURE: 56 MMHG | OXYGEN SATURATION: 96 % | DIASTOLIC BLOOD PRESSURE: 80 MMHG | RESPIRATION RATE: 16 BRPM | HEART RATE: 93 BPM | DIASTOLIC BLOOD PRESSURE: 73 MMHG | DIASTOLIC BLOOD PRESSURE: 60 MMHG | SYSTOLIC BLOOD PRESSURE: 132 MMHG | WEIGHT: 174.06 LBS | RESPIRATION RATE: 14 BRPM | OXYGEN SATURATION: 95 % | TEMPERATURE: 97 F | DIASTOLIC BLOOD PRESSURE: 45 MMHG | SYSTOLIC BLOOD PRESSURE: 122 MMHG | BODY MASS INDEX: 29.59 KG/M2 | HEIGHT: 64 IN | BODY MASS INDEX: 29.72 KG/M2 | OXYGEN SATURATION: 94 % | SYSTOLIC BLOOD PRESSURE: 122 MMHG | WEIGHT: 179.25 LBS | BODY MASS INDEX: 29.63 KG/M2 | HEART RATE: 100 BPM | HEART RATE: 89 BPM | HEIGHT: 64 IN | RESPIRATION RATE: 16 BRPM | RESPIRATION RATE: 12 BRPM | SYSTOLIC BLOOD PRESSURE: 110 MMHG | BODY MASS INDEX: 30.77 KG/M2 | OXYGEN SATURATION: 91 %

## 2021-01-01 VITALS
WEIGHT: 176 LBS | SYSTOLIC BLOOD PRESSURE: 110 MMHG | BODY MASS INDEX: 30.05 KG/M2 | DIASTOLIC BLOOD PRESSURE: 72 MMHG | HEIGHT: 64 IN | HEART RATE: 72 BPM

## 2021-01-01 VITALS
SYSTOLIC BLOOD PRESSURE: 140 MMHG | BODY MASS INDEX: 30.05 KG/M2 | HEART RATE: 72 BPM | WEIGHT: 180.12 LBS | BODY MASS INDEX: 30.92 KG/M2 | WEIGHT: 176 LBS | DIASTOLIC BLOOD PRESSURE: 80 MMHG | HEIGHT: 64 IN | DIASTOLIC BLOOD PRESSURE: 60 MMHG | HEART RATE: 82 BPM | OXYGEN SATURATION: 94 % | SYSTOLIC BLOOD PRESSURE: 126 MMHG | TEMPERATURE: 97.2 F

## 2021-01-01 VITALS
HEART RATE: 87 BPM | TEMPERATURE: 96.9 F | OXYGEN SATURATION: 95 % | BODY MASS INDEX: 31.07 KG/M2 | SYSTOLIC BLOOD PRESSURE: 116 MMHG | WEIGHT: 182 LBS | DIASTOLIC BLOOD PRESSURE: 90 MMHG | DIASTOLIC BLOOD PRESSURE: 57 MMHG | HEIGHT: 64 IN | BODY MASS INDEX: 29.63 KG/M2 | HEART RATE: 80 BPM | WEIGHT: 172.6 LBS | SYSTOLIC BLOOD PRESSURE: 150 MMHG

## 2021-01-01 VITALS
TEMPERATURE: 96.4 F | SYSTOLIC BLOOD PRESSURE: 120 MMHG | HEART RATE: 84 BPM | BODY MASS INDEX: 29.87 KG/M2 | DIASTOLIC BLOOD PRESSURE: 80 MMHG | WEIGHT: 174 LBS | OXYGEN SATURATION: 91 %

## 2021-01-01 VITALS
DIASTOLIC BLOOD PRESSURE: 64 MMHG | SYSTOLIC BLOOD PRESSURE: 112 MMHG | HEIGHT: 64 IN | HEART RATE: 94 BPM | BODY MASS INDEX: 30.73 KG/M2 | WEIGHT: 180 LBS

## 2021-01-01 VITALS
HEIGHT: 64 IN | OXYGEN SATURATION: 94 % | SYSTOLIC BLOOD PRESSURE: 124 MMHG | HEART RATE: 91 BPM | BODY MASS INDEX: 30.92 KG/M2 | WEIGHT: 181.12 LBS | DIASTOLIC BLOOD PRESSURE: 70 MMHG | TEMPERATURE: 97.2 F

## 2021-01-01 VITALS
HEART RATE: 78 BPM | SYSTOLIC BLOOD PRESSURE: 142 MMHG | OXYGEN SATURATION: 92 % | HEIGHT: 63 IN | TEMPERATURE: 96.9 F | BODY MASS INDEX: 30.65 KG/M2 | WEIGHT: 173 LBS | DIASTOLIC BLOOD PRESSURE: 80 MMHG

## 2021-01-01 VITALS
DIASTOLIC BLOOD PRESSURE: 70 MMHG | SYSTOLIC BLOOD PRESSURE: 110 MMHG | BODY MASS INDEX: 30.91 KG/M2 | WEIGHT: 180.06 LBS | HEART RATE: 88 BPM | OXYGEN SATURATION: 92 % | TEMPERATURE: 96.2 F

## 2021-01-01 VITALS
HEART RATE: 91 BPM | SYSTOLIC BLOOD PRESSURE: 122 MMHG | WEIGHT: 179.5 LBS | OXYGEN SATURATION: 92 % | DIASTOLIC BLOOD PRESSURE: 74 MMHG | BODY MASS INDEX: 30.64 KG/M2 | TEMPERATURE: 97.6 F | HEIGHT: 64 IN

## 2021-01-01 VITALS
WEIGHT: 180.06 LBS | HEART RATE: 112 BPM | DIASTOLIC BLOOD PRESSURE: 78 MMHG | SYSTOLIC BLOOD PRESSURE: 122 MMHG | HEIGHT: 64 IN | BODY MASS INDEX: 30.74 KG/M2 | TEMPERATURE: 97.3 F | OXYGEN SATURATION: 95 %

## 2021-01-01 VITALS
WEIGHT: 179 LBS | DIASTOLIC BLOOD PRESSURE: 43 MMHG | BODY MASS INDEX: 31.71 KG/M2 | SYSTOLIC BLOOD PRESSURE: 108 MMHG | HEIGHT: 63 IN | HEART RATE: 83 BPM

## 2021-01-01 VITALS
SYSTOLIC BLOOD PRESSURE: 130 MMHG | OXYGEN SATURATION: 91 % | DIASTOLIC BLOOD PRESSURE: 80 MMHG | HEART RATE: 93 BPM | WEIGHT: 175.25 LBS | TEMPERATURE: 97.6 F | BODY MASS INDEX: 30.08 KG/M2

## 2021-01-01 VITALS
DIASTOLIC BLOOD PRESSURE: 64 MMHG | HEART RATE: 104 BPM | HEIGHT: 64 IN | WEIGHT: 178 LBS | BODY MASS INDEX: 30.39 KG/M2 | OXYGEN SATURATION: 95 % | SYSTOLIC BLOOD PRESSURE: 118 MMHG

## 2021-01-01 VITALS
HEART RATE: 76 BPM | HEIGHT: 63 IN | BODY MASS INDEX: 29.92 KG/M2 | SYSTOLIC BLOOD PRESSURE: 98 MMHG | DIASTOLIC BLOOD PRESSURE: 60 MMHG | WEIGHT: 168.87 LBS | RESPIRATION RATE: 18 BRPM | TEMPERATURE: 98.1 F | OXYGEN SATURATION: 93 %

## 2021-01-01 VITALS
HEART RATE: 93 BPM | TEMPERATURE: 97 F | WEIGHT: 174 LBS | DIASTOLIC BLOOD PRESSURE: 70 MMHG | BODY MASS INDEX: 29.71 KG/M2 | OXYGEN SATURATION: 97 % | HEIGHT: 64 IN | SYSTOLIC BLOOD PRESSURE: 116 MMHG

## 2021-01-01 VITALS
WEIGHT: 181 LBS | TEMPERATURE: 97 F | BODY MASS INDEX: 31.07 KG/M2 | DIASTOLIC BLOOD PRESSURE: 70 MMHG | HEART RATE: 92 BPM | RESPIRATION RATE: 18 BRPM | OXYGEN SATURATION: 95 % | SYSTOLIC BLOOD PRESSURE: 105 MMHG

## 2021-01-01 VITALS
WEIGHT: 178.25 LBS | DIASTOLIC BLOOD PRESSURE: 72 MMHG | BODY MASS INDEX: 31.58 KG/M2 | HEART RATE: 90 BPM | TEMPERATURE: 97.8 F | SYSTOLIC BLOOD PRESSURE: 136 MMHG | HEIGHT: 63 IN | OXYGEN SATURATION: 93 %

## 2021-01-01 VITALS
BODY MASS INDEX: 30.12 KG/M2 | DIASTOLIC BLOOD PRESSURE: 93 MMHG | OXYGEN SATURATION: 94 % | WEIGHT: 170 LBS | RESPIRATION RATE: 14 BRPM | HEART RATE: 95 BPM | SYSTOLIC BLOOD PRESSURE: 123 MMHG | HEIGHT: 63 IN

## 2021-01-01 VITALS
SYSTOLIC BLOOD PRESSURE: 134 MMHG | OXYGEN SATURATION: 94 % | HEIGHT: 63 IN | TEMPERATURE: 96.4 F | BODY MASS INDEX: 31.54 KG/M2 | WEIGHT: 178 LBS | HEART RATE: 90 BPM | DIASTOLIC BLOOD PRESSURE: 80 MMHG

## 2021-01-01 VITALS
OXYGEN SATURATION: 96 % | HEART RATE: 91 BPM | WEIGHT: 179.5 LBS | DIASTOLIC BLOOD PRESSURE: 79 MMHG | SYSTOLIC BLOOD PRESSURE: 137 MMHG | HEIGHT: 63 IN | BODY MASS INDEX: 31.8 KG/M2 | TEMPERATURE: 97.3 F

## 2021-01-01 VITALS
HEART RATE: 99 BPM | BODY MASS INDEX: 31.88 KG/M2 | OXYGEN SATURATION: 92 % | HEIGHT: 63 IN | DIASTOLIC BLOOD PRESSURE: 75 MMHG | TEMPERATURE: 98.9 F | WEIGHT: 179.9 LBS | RESPIRATION RATE: 26 BRPM | SYSTOLIC BLOOD PRESSURE: 119 MMHG

## 2021-01-01 VITALS — TEMPERATURE: 97.7 F | OXYGEN SATURATION: 91 % | HEART RATE: 67 BPM

## 2021-01-01 VITALS
BODY MASS INDEX: 31.43 KG/M2 | TEMPERATURE: 98.7 F | HEIGHT: 63 IN | OXYGEN SATURATION: 94 % | DIASTOLIC BLOOD PRESSURE: 72 MMHG | HEART RATE: 97 BPM | WEIGHT: 177.37 LBS | SYSTOLIC BLOOD PRESSURE: 124 MMHG

## 2021-01-01 VITALS — OXYGEN SATURATION: 94 % | BODY MASS INDEX: 30.65 KG/M2 | HEART RATE: 80 BPM | HEIGHT: 63 IN | WEIGHT: 173 LBS

## 2021-01-01 VITALS — HEART RATE: 97 BPM | TEMPERATURE: 98.1 F | OXYGEN SATURATION: 89 %

## 2021-01-01 VITALS
DIASTOLIC BLOOD PRESSURE: 90 MMHG | OXYGEN SATURATION: 99 % | HEIGHT: 63 IN | TEMPERATURE: 97.3 F | BODY MASS INDEX: 32.6 KG/M2 | HEART RATE: 85 BPM | SYSTOLIC BLOOD PRESSURE: 125 MMHG | WEIGHT: 184 LBS

## 2021-01-01 VITALS
HEART RATE: 95 BPM | OXYGEN SATURATION: 93 % | SYSTOLIC BLOOD PRESSURE: 124 MMHG | TEMPERATURE: 97.8 F | DIASTOLIC BLOOD PRESSURE: 78 MMHG | BODY MASS INDEX: 30.98 KG/M2 | WEIGHT: 180.5 LBS

## 2021-01-01 VITALS
WEIGHT: 174 LBS | SYSTOLIC BLOOD PRESSURE: 122 MMHG | DIASTOLIC BLOOD PRESSURE: 53 MMHG | BODY MASS INDEX: 30.83 KG/M2 | HEIGHT: 63 IN | OXYGEN SATURATION: 92 % | RESPIRATION RATE: 15 BRPM | HEART RATE: 95 BPM | TEMPERATURE: 98.8 F

## 2021-01-01 VITALS
WEIGHT: 176 LBS | RESPIRATION RATE: 16 BRPM | HEART RATE: 80 BPM | HEIGHT: 63 IN | TEMPERATURE: 97 F | SYSTOLIC BLOOD PRESSURE: 84 MMHG | OXYGEN SATURATION: 95 % | BODY MASS INDEX: 31.18 KG/M2 | DIASTOLIC BLOOD PRESSURE: 63 MMHG

## 2021-01-01 VITALS — BODY MASS INDEX: 30.83 KG/M2 | OXYGEN SATURATION: 95 % | HEART RATE: 118 BPM | WEIGHT: 174 LBS | HEIGHT: 63 IN

## 2021-01-01 VITALS
WEIGHT: 177 LBS | HEIGHT: 63 IN | HEART RATE: 100 BPM | OXYGEN SATURATION: 92 % | TEMPERATURE: 98 F | DIASTOLIC BLOOD PRESSURE: 70 MMHG | SYSTOLIC BLOOD PRESSURE: 105 MMHG | BODY MASS INDEX: 31.36 KG/M2

## 2021-01-01 VITALS
HEIGHT: 63 IN | TEMPERATURE: 97.5 F | BODY MASS INDEX: 30.48 KG/M2 | HEART RATE: 77 BPM | DIASTOLIC BLOOD PRESSURE: 72 MMHG | WEIGHT: 172 LBS | OXYGEN SATURATION: 94 % | SYSTOLIC BLOOD PRESSURE: 110 MMHG

## 2021-01-01 VITALS
HEIGHT: 64 IN | DIASTOLIC BLOOD PRESSURE: 64 MMHG | WEIGHT: 183 LBS | BODY MASS INDEX: 31.24 KG/M2 | SYSTOLIC BLOOD PRESSURE: 124 MMHG | HEART RATE: 76 BPM

## 2021-01-01 VITALS
DIASTOLIC BLOOD PRESSURE: 80 MMHG | SYSTOLIC BLOOD PRESSURE: 126 MMHG | WEIGHT: 174 LBS | OXYGEN SATURATION: 93 % | TEMPERATURE: 97.5 F | BODY MASS INDEX: 25.7 KG/M2 | HEART RATE: 91 BPM

## 2021-01-01 VITALS
TEMPERATURE: 98.1 F | HEART RATE: 83 BPM | WEIGHT: 179 LBS | SYSTOLIC BLOOD PRESSURE: 120 MMHG | HEIGHT: 63 IN | DIASTOLIC BLOOD PRESSURE: 68 MMHG | OXYGEN SATURATION: 96 % | BODY MASS INDEX: 31.71 KG/M2

## 2021-01-01 VITALS
DIASTOLIC BLOOD PRESSURE: 78 MMHG | WEIGHT: 178 LBS | BODY MASS INDEX: 30.55 KG/M2 | HEART RATE: 91 BPM | TEMPERATURE: 98.3 F | OXYGEN SATURATION: 92 % | SYSTOLIC BLOOD PRESSURE: 136 MMHG

## 2021-01-01 VITALS
DIASTOLIC BLOOD PRESSURE: 70 MMHG | SYSTOLIC BLOOD PRESSURE: 122 MMHG | HEART RATE: 90 BPM | BODY MASS INDEX: 31.68 KG/M2 | OXYGEN SATURATION: 94 % | WEIGHT: 185.56 LBS | TEMPERATURE: 98 F | HEIGHT: 64 IN

## 2021-01-01 VITALS
WEIGHT: 176.5 LBS | HEIGHT: 64 IN | DIASTOLIC BLOOD PRESSURE: 72 MMHG | TEMPERATURE: 97.8 F | HEART RATE: 91 BPM | OXYGEN SATURATION: 94 % | SYSTOLIC BLOOD PRESSURE: 124 MMHG | BODY MASS INDEX: 30.13 KG/M2

## 2021-01-01 VITALS
HEIGHT: 63 IN | SYSTOLIC BLOOD PRESSURE: 128 MMHG | DIASTOLIC BLOOD PRESSURE: 64 MMHG | BODY MASS INDEX: 31.76 KG/M2 | HEART RATE: 77 BPM | OXYGEN SATURATION: 96 % | TEMPERATURE: 97.1 F | WEIGHT: 179.25 LBS

## 2021-01-01 VITALS
TEMPERATURE: 97 F | BODY MASS INDEX: 31.79 KG/M2 | HEART RATE: 115 BPM | WEIGHT: 185.19 LBS | SYSTOLIC BLOOD PRESSURE: 130 MMHG | DIASTOLIC BLOOD PRESSURE: 84 MMHG | OXYGEN SATURATION: 95 %

## 2021-01-01 VITALS
WEIGHT: 170.64 LBS | SYSTOLIC BLOOD PRESSURE: 110 MMHG | TEMPERATURE: 99 F | HEIGHT: 63 IN | BODY MASS INDEX: 30.23 KG/M2 | DIASTOLIC BLOOD PRESSURE: 66 MMHG | HEART RATE: 77 BPM | OXYGEN SATURATION: 92 %

## 2021-01-01 VITALS
TEMPERATURE: 97.4 F | OXYGEN SATURATION: 94 % | SYSTOLIC BLOOD PRESSURE: 112 MMHG | HEART RATE: 94 BPM | BODY MASS INDEX: 30.12 KG/M2 | WEIGHT: 175.5 LBS | DIASTOLIC BLOOD PRESSURE: 72 MMHG

## 2021-01-01 VITALS — TEMPERATURE: 97.2 F | OXYGEN SATURATION: 93 % | HEART RATE: 81 BPM

## 2021-01-01 VITALS — OXYGEN SATURATION: 97 % | WEIGHT: 185.2 LBS | HEIGHT: 64 IN | BODY MASS INDEX: 31.62 KG/M2 | HEART RATE: 76 BPM

## 2021-01-01 VITALS
WEIGHT: 180 LBS | BODY MASS INDEX: 30.9 KG/M2 | DIASTOLIC BLOOD PRESSURE: 80 MMHG | SYSTOLIC BLOOD PRESSURE: 120 MMHG | OXYGEN SATURATION: 96 % | HEART RATE: 107 BPM

## 2021-01-01 DIAGNOSIS — M79.89 SWELLING OF LOWER EXTREMITY: ICD-10-CM

## 2021-01-01 DIAGNOSIS — G47.33 OSA (OBSTRUCTIVE SLEEP APNEA): Primary | ICD-10-CM

## 2021-01-01 DIAGNOSIS — R60.0 EDEMA OF RIGHT LOWER EXTREMITY: Primary | ICD-10-CM

## 2021-01-01 DIAGNOSIS — R93.89 ABNORMAL FINDING ON IMAGING: ICD-10-CM

## 2021-01-01 DIAGNOSIS — G47.09 OTHER INSOMNIA: ICD-10-CM

## 2021-01-01 DIAGNOSIS — R26.2 DIFFICULTY IN WALKING: ICD-10-CM

## 2021-01-01 DIAGNOSIS — R06.02 SOB (SHORTNESS OF BREATH): ICD-10-CM

## 2021-01-01 DIAGNOSIS — E83.52 HYPERCALCEMIA: Primary | ICD-10-CM

## 2021-01-01 DIAGNOSIS — M47.27 OSTEOARTHRITIS OF SPINE WITH RADICULOPATHY, LUMBOSACRAL REGION: Primary | ICD-10-CM

## 2021-01-01 DIAGNOSIS — R89.9 ELEVATED LABORATORY TEST RESULT: ICD-10-CM

## 2021-01-01 DIAGNOSIS — R91.1 LUNG NODULE: Primary | ICD-10-CM

## 2021-01-01 DIAGNOSIS — L03.116 CELLULITIS OF LEFT LOWER EXTREMITY: Primary | ICD-10-CM

## 2021-01-01 DIAGNOSIS — M16.12 ARTHRITIS OF LEFT HIP: ICD-10-CM

## 2021-01-01 DIAGNOSIS — M54.16 LUMBAR RADICULOPATHY: ICD-10-CM

## 2021-01-01 DIAGNOSIS — E03.9 HYPOTHYROIDISM, UNSPECIFIED TYPE: ICD-10-CM

## 2021-01-01 DIAGNOSIS — R05.9 COUGH: Primary | ICD-10-CM

## 2021-01-01 DIAGNOSIS — F17.201 TOBACCO ABUSE, IN REMISSION: ICD-10-CM

## 2021-01-01 DIAGNOSIS — J43.2 CENTRILOBULAR EMPHYSEMA (HCC): ICD-10-CM

## 2021-01-01 DIAGNOSIS — Z47.1 AFTERCARE FOLLOWING LEFT HIP JOINT REPLACEMENT SURGERY: Primary | ICD-10-CM

## 2021-01-01 DIAGNOSIS — I48.4 ATYPICAL ATRIAL FLUTTER (HCC): ICD-10-CM

## 2021-01-01 DIAGNOSIS — R00.0 TACHYARRHYTHMIA: ICD-10-CM

## 2021-01-01 DIAGNOSIS — E03.9 HYPOTHYROIDISM, UNSPECIFIED TYPE: Primary | ICD-10-CM

## 2021-01-01 DIAGNOSIS — F41.0 COMPLAINT OF PANIC ATTACK: ICD-10-CM

## 2021-01-01 DIAGNOSIS — I38 VALVULAR HEART DISEASE: ICD-10-CM

## 2021-01-01 DIAGNOSIS — I47.1 ATRIAL TACHYCARDIA (HCC): Primary | ICD-10-CM

## 2021-01-01 DIAGNOSIS — Z23 NEED FOR IMMUNIZATION AGAINST INFLUENZA: ICD-10-CM

## 2021-01-01 DIAGNOSIS — M54.16 LUMBAR RADICULOPATHY: Primary | ICD-10-CM

## 2021-01-01 DIAGNOSIS — M16.12 PRIMARY OSTEOARTHRITIS OF LEFT HIP: Primary | ICD-10-CM

## 2021-01-01 DIAGNOSIS — M79.89 LEFT LEG SWELLING: ICD-10-CM

## 2021-01-01 DIAGNOSIS — M16.12 PRIMARY OSTEOARTHRITIS OF LEFT HIP: ICD-10-CM

## 2021-01-01 DIAGNOSIS — M25.552 LEFT HIP PAIN: Primary | ICD-10-CM

## 2021-01-01 DIAGNOSIS — R60.0 BILATERAL LOWER EXTREMITY EDEMA: ICD-10-CM

## 2021-01-01 DIAGNOSIS — R06.09 CHRONIC DYSPNEA: ICD-10-CM

## 2021-01-01 DIAGNOSIS — M79.605 LEG PAIN, DIFFUSE, LEFT: ICD-10-CM

## 2021-01-01 DIAGNOSIS — I47.1 ATRIAL TACHYCARDIA, PAROXYSMAL (HCC): ICD-10-CM

## 2021-01-01 DIAGNOSIS — R91.1 SOLITARY PULMONARY NODULE: ICD-10-CM

## 2021-01-01 DIAGNOSIS — J40 BRONCHITIS: ICD-10-CM

## 2021-01-01 DIAGNOSIS — K59.00 CONSTIPATION, UNSPECIFIED CONSTIPATION TYPE: ICD-10-CM

## 2021-01-01 DIAGNOSIS — M25.552 LEFT HIP PAIN: ICD-10-CM

## 2021-01-01 DIAGNOSIS — Z96.642 HISTORY OF TOTAL HIP ARTHROPLASTY, LEFT: ICD-10-CM

## 2021-01-01 DIAGNOSIS — I47.1 ATRIAL TACHYCARDIA (HCC): ICD-10-CM

## 2021-01-01 DIAGNOSIS — R06.02 SOBOE (SHORTNESS OF BREATH ON EXERTION): ICD-10-CM

## 2021-01-01 DIAGNOSIS — I27.20 PULMONARY HYPERTENSION (HCC): ICD-10-CM

## 2021-01-01 DIAGNOSIS — R06.83 SNORING: ICD-10-CM

## 2021-01-01 DIAGNOSIS — Q21.10 ATRIAL SEPTAL DEFECT: ICD-10-CM

## 2021-01-01 DIAGNOSIS — Z47.89 AFTERCARE FOLLOWING SURGERY OF THE MUSCULOSKELETAL SYSTEM: Primary | ICD-10-CM

## 2021-01-01 DIAGNOSIS — I44.30 AV BLOCK: ICD-10-CM

## 2021-01-01 DIAGNOSIS — Z87.74 HISTORY OF REPAIR OF CONGENITAL ATRIAL SEPTAL DEFECT (ASD): ICD-10-CM

## 2021-01-01 DIAGNOSIS — G47.19 EXCESSIVE DAYTIME SLEEPINESS: ICD-10-CM

## 2021-01-01 DIAGNOSIS — Z96.642 S/P HIP REPLACEMENT, LEFT: ICD-10-CM

## 2021-01-01 DIAGNOSIS — Z20.822 SUSPECTED COVID-19 VIRUS INFECTION: ICD-10-CM

## 2021-01-01 DIAGNOSIS — R53.83 FATIGUE, UNSPECIFIED TYPE: Primary | ICD-10-CM

## 2021-01-01 DIAGNOSIS — Z00.00 MEDICARE ANNUAL WELLNESS VISIT, SUBSEQUENT: Primary | ICD-10-CM

## 2021-01-01 DIAGNOSIS — E11.9 DIET-CONTROLLED DIABETES MELLITUS (HCC): ICD-10-CM

## 2021-01-01 DIAGNOSIS — Z96.642 AFTERCARE FOLLOWING LEFT HIP JOINT REPLACEMENT SURGERY: Primary | ICD-10-CM

## 2021-01-01 DIAGNOSIS — Z47.89 AFTERCARE FOLLOWING SURGERY OF THE MUSCULOSKELETAL SYSTEM: ICD-10-CM

## 2021-01-01 DIAGNOSIS — J44.9 CHRONIC OBSTRUCTIVE PULMONARY DISEASE, UNSPECIFIED COPD TYPE (HCC): ICD-10-CM

## 2021-01-01 DIAGNOSIS — Z71.85 VACCINE COUNSELING: ICD-10-CM

## 2021-01-01 DIAGNOSIS — R50.9 FEVER, UNSPECIFIED FEVER CAUSE: Primary | ICD-10-CM

## 2021-01-01 DIAGNOSIS — R26.2 DIFFICULTY IN WALKING: Primary | ICD-10-CM

## 2021-01-01 DIAGNOSIS — Z95.0 PRESENCE OF CARDIAC PACEMAKER: ICD-10-CM

## 2021-01-01 DIAGNOSIS — M25.531 WRIST PAIN, ACUTE, RIGHT: Primary | ICD-10-CM

## 2021-01-01 DIAGNOSIS — M43.07 LUMBOSACRAL SPONDYLOLYSIS: ICD-10-CM

## 2021-01-01 DIAGNOSIS — R60.0 EDEMA OF RIGHT LOWER EXTREMITY: ICD-10-CM

## 2021-01-01 DIAGNOSIS — I44.30 AV BLOCK: Primary | ICD-10-CM

## 2021-01-01 DIAGNOSIS — E83.52 HYPERCALCEMIA: ICD-10-CM

## 2021-01-01 DIAGNOSIS — Z79.01 CHRONIC ANTICOAGULATION: ICD-10-CM

## 2021-01-01 DIAGNOSIS — Z78.9 DECREASED ACTIVITIES OF DAILY LIVING (ADL): ICD-10-CM

## 2021-01-01 DIAGNOSIS — R93.89 ABNORMAL FINDING ON IMAGING: Primary | ICD-10-CM

## 2021-01-01 DIAGNOSIS — M43.07 LUMBOSACRAL SPONDYLOLYSIS: Primary | ICD-10-CM

## 2021-01-01 LAB
25(OH)D3 SERPL-MCNC: 41.3 NG/ML (ref 30–100)
ALBUMIN SERPL ELPH-MCNC: 4.1 G/DL (ref 2.9–4.4)
ALBUMIN SERPL-MCNC: 3.8 G/DL (ref 3.5–5.2)
ALBUMIN SERPL-MCNC: 3.9 G/DL (ref 3.5–5.2)
ALBUMIN SERPL-MCNC: 4.1 G/DL (ref 3.5–5.2)
ALBUMIN SERPL-MCNC: 4.5 G/DL (ref 3.5–5.2)
ALBUMIN SERPL-MCNC: 4.7 G/DL (ref 3.5–5)
ALBUMIN SERPL-MCNC: 4.8 G/DL (ref 3.5–5)
ALBUMIN/GLOB SERPL: 1.1 G/DL
ALBUMIN/GLOB SERPL: 1.3 G/DL
ALBUMIN/GLOB SERPL: 1.3 {RATIO} (ref 1.4–2.6)
ALBUMIN/GLOB SERPL: 1.4 G/DL
ALBUMIN/GLOB SERPL: 1.4 {RATIO} (ref 0.7–1.7)
ALBUMIN/GLOB SERPL: 1.6 G/DL
ALBUMIN/GLOB SERPL: 1.8 {RATIO} (ref 1.4–2.6)
ALP SERPL-CCNC: 57 U/L (ref 43–160)
ALP SERPL-CCNC: 57 U/L (ref 43–160)
ALP SERPL-CCNC: 84 U/L (ref 39–117)
ALP SERPL-CCNC: 87 U/L (ref 39–117)
ALP SERPL-CCNC: 88 U/L (ref 39–117)
ALP SERPL-CCNC: 98 U/L (ref 39–117)
ALPHA1 GLOB SERPL ELPH-MCNC: 0.3 G/DL (ref 0–0.4)
ALPHA2 GLOB SERPL ELPH-MCNC: 0.7 G/DL (ref 0.4–1)
ALT SERPL W P-5'-P-CCNC: 10 U/L (ref 1–33)
ALT SERPL W P-5'-P-CCNC: 11 U/L (ref 1–33)
ALT SERPL W P-5'-P-CCNC: 12 U/L (ref 1–33)
ALT SERPL W P-5'-P-CCNC: 31 U/L (ref 1–33)
ALT SERPL-CCNC: 13 U/L (ref 10–40)
ALT SERPL-CCNC: 18 U/L (ref 10–40)
AMPHETAMINES UR QL SCN: NEGATIVE
ANION GAP SERPL CALC-SCNC: 16 MMOL/L (ref 8–19)
ANION GAP SERPL CALC-SCNC: 17 MMOL/L (ref 8–19)
ANION GAP SERPL CALCULATED.3IONS-SCNC: 11 MMOL/L (ref 5–15)
ANION GAP SERPL CALCULATED.3IONS-SCNC: 13 MMOL/L (ref 5–15)
ANION GAP SERPL CALCULATED.3IONS-SCNC: 8.7 MMOL/L (ref 5–15)
ANION GAP SERPL CALCULATED.3IONS-SCNC: 9.8 MMOL/L (ref 5–15)
ANION GAP SERPL CALCULATED.3IONS-SCNC: 9.9 MMOL/L (ref 5–15)
AST SERPL-CCNC: 16 U/L (ref 1–32)
AST SERPL-CCNC: 17 U/L (ref 15–50)
AST SERPL-CCNC: 21 U/L (ref 15–50)
AST SERPL-CCNC: 21 U/L (ref 1–32)
AST SERPL-CCNC: 22 U/L (ref 1–32)
AST SERPL-CCNC: 29 U/L (ref 1–32)
B-GLOBULIN SERPL ELPH-MCNC: 1.1 G/DL (ref 0.7–1.3)
BACTERIA SPEC AEROBE CULT: NORMAL
BACTERIA SPEC AEROBE CULT: NORMAL
BARBITURATES UR QL SCN: NEGATIVE
BASOPHILS # BLD AUTO: 0.02 10*3/MM3 (ref 0–0.2)
BASOPHILS # BLD AUTO: 0.02 10*3/UL (ref 0–0.2)
BASOPHILS # BLD AUTO: 0.03 10*3/MM3 (ref 0–0.2)
BASOPHILS # BLD AUTO: 0.03 10*3/MM3 (ref 0–0.2)
BASOPHILS # BLD AUTO: 0.05 10*3/MM3 (ref 0–0.2)
BASOPHILS NFR BLD AUTO: 0.2 % (ref 0–1.5)
BASOPHILS NFR BLD AUTO: 0.3 % (ref 0–1.5)
BASOPHILS NFR BLD AUTO: 0.3 % (ref 0–3)
BASOPHILS NFR BLD AUTO: 0.4 % (ref 0–1.5)
BASOPHILS NFR BLD AUTO: 0.6 % (ref 0–1.5)
BENZODIAZ UR QL SCN: NEGATIVE
BH CV LOW VAS RIGHT GSV DIST CALF VESSEL: 1
BH CV LOWER VAS RIGHT GSV DIST THIGH COMPRESSIBILTY: NORMAL
BH CV LOWER VAS RIGHT GSV MID CALF COMPRESSIBILTY: NORMAL
BH CV LOWER VASCULAR LEFT COMMON FEMORAL AUGMENT: NORMAL
BH CV LOWER VASCULAR LEFT COMMON FEMORAL COMPETENT: NORMAL
BH CV LOWER VASCULAR LEFT COMMON FEMORAL COMPETENT: NORMAL
BH CV LOWER VASCULAR LEFT COMMON FEMORAL COMPRESS: NORMAL
BH CV LOWER VASCULAR LEFT COMMON FEMORAL PHASIC: NORMAL
BH CV LOWER VASCULAR LEFT COMMON FEMORAL SPONT: NORMAL
BH CV LOWER VASCULAR LEFT DISTAL FEMORAL AUGMENT: NORMAL
BH CV LOWER VASCULAR LEFT DISTAL FEMORAL COMPETENT: NORMAL
BH CV LOWER VASCULAR LEFT DISTAL FEMORAL COMPRESS: NORMAL
BH CV LOWER VASCULAR LEFT DISTAL FEMORAL COMPRESS: NORMAL
BH CV LOWER VASCULAR LEFT DISTAL FEMORAL PHASIC: NORMAL
BH CV LOWER VASCULAR LEFT DISTAL FEMORAL SPONT: NORMAL
BH CV LOWER VASCULAR LEFT GREATER SAPH AK COMPRESS: NORMAL
BH CV LOWER VASCULAR LEFT GREATER SAPH AK COMPRESS: NORMAL
BH CV LOWER VASCULAR LEFT MID FEMORAL AUGMENT: NORMAL
BH CV LOWER VASCULAR LEFT MID FEMORAL COMPETENT: NORMAL
BH CV LOWER VASCULAR LEFT MID FEMORAL COMPRESS: NORMAL
BH CV LOWER VASCULAR LEFT MID FEMORAL COMPRESS: NORMAL
BH CV LOWER VASCULAR LEFT MID FEMORAL PHASIC: NORMAL
BH CV LOWER VASCULAR LEFT MID FEMORAL SPONT: NORMAL
BH CV LOWER VASCULAR LEFT PERONEAL COMPRESS: NORMAL
BH CV LOWER VASCULAR LEFT PERONEAL COMPRESS: NORMAL
BH CV LOWER VASCULAR LEFT POPLITEAL AUGMENT: NORMAL
BH CV LOWER VASCULAR LEFT POPLITEAL AUGMENT: NORMAL
BH CV LOWER VASCULAR LEFT POPLITEAL COMPETENT: NORMAL
BH CV LOWER VASCULAR LEFT POPLITEAL COMPETENT: NORMAL
BH CV LOWER VASCULAR LEFT POPLITEAL COMPRESS: NORMAL
BH CV LOWER VASCULAR LEFT POPLITEAL COMPRESS: NORMAL
BH CV LOWER VASCULAR LEFT POPLITEAL PHASIC: NORMAL
BH CV LOWER VASCULAR LEFT POPLITEAL PHASIC: NORMAL
BH CV LOWER VASCULAR LEFT POPLITEAL SPONT: NORMAL
BH CV LOWER VASCULAR LEFT POPLITEAL SPONT: NORMAL
BH CV LOWER VASCULAR LEFT POSTERIOR TIBIAL AUGMENT: NORMAL
BH CV LOWER VASCULAR LEFT POSTERIOR TIBIAL COMPRESS: NORMAL
BH CV LOWER VASCULAR LEFT POSTERIOR TIBIAL COMPRESS: NORMAL
BH CV LOWER VASCULAR LEFT PROXIMAL FEMORAL COMPRESS: NORMAL
BH CV LOWER VASCULAR LEFT PROXIMAL FEMORAL COMPRESS: NORMAL
BH CV LOWER VASCULAR RIGHT COMMON FEMORAL AUGMENT: NORMAL
BH CV LOWER VASCULAR RIGHT COMMON FEMORAL COMPETENT: NORMAL
BH CV LOWER VASCULAR RIGHT COMMON FEMORAL COMPETENT: NORMAL
BH CV LOWER VASCULAR RIGHT COMMON FEMORAL COMPRESS: NORMAL
BH CV LOWER VASCULAR RIGHT COMMON FEMORAL PHASIC: NORMAL
BH CV LOWER VASCULAR RIGHT COMMON FEMORAL SPONT: NORMAL
BH CV LOWER VASCULAR RIGHT DISTAL FEMORAL COMPRESS: NORMAL
BH CV LOWER VASCULAR RIGHT GREATER SAPH AK COMPETENT: NORMAL
BH CV LOWER VASCULAR RIGHT GSV DIST THIGH COMPETENT: NORMAL
BH CV LOWER VASCULAR RIGHT GSV MID CALF COMPETENT: NORMAL
BH CV LOWER VASCULAR RIGHT LESSER SAPH COMPETENT: NORMAL
BH CV LOWER VASCULAR RIGHT LESSER SAPH COMPRESS: NORMAL
BH CV LOWER VASCULAR RIGHT MID FEMORAL AUGMENT: NORMAL
BH CV LOWER VASCULAR RIGHT MID FEMORAL COMPETENT: NORMAL
BH CV LOWER VASCULAR RIGHT MID FEMORAL COMPRESS: NORMAL
BH CV LOWER VASCULAR RIGHT MID FEMORAL PHASIC: NORMAL
BH CV LOWER VASCULAR RIGHT MID FEMORAL SPONT: NORMAL
BH CV LOWER VASCULAR RIGHT PERONEAL COMPRESS: NORMAL
BH CV LOWER VASCULAR RIGHT POPLITEAL AUGMENT: NORMAL
BH CV LOWER VASCULAR RIGHT POPLITEAL COMPETENT: NORMAL
BH CV LOWER VASCULAR RIGHT POPLITEAL COMPRESS: NORMAL
BH CV LOWER VASCULAR RIGHT POPLITEAL PHASIC: NORMAL
BH CV LOWER VASCULAR RIGHT POPLITEAL SPONT: NORMAL
BH CV LOWER VASCULAR RIGHT POSTERIOR TIBIAL COMPRESS: NORMAL
BH CV LOWER VASCULAR RIGHT PROXIMAL FEMORAL COMPRESS: NORMAL
BH CV LOWER VASCULAR RIGHT SAPHENOFEMORAL JUNCTION AUGMENT: NORMAL
BH CV LOWER VASCULAR RIGHT SAPHENOFEMORAL JUNCTION COMPETENT: NORMAL
BH CV LOWER VASCULAR RIGHT SAPHENOFEMORAL JUNCTION COMPRESS: NORMAL
BH CV LOWER VASCULAR RIGHT SAPHENOFEMORAL JUNCTION PHASIC: NORMAL
BH CV LOWER VASCULAR RIGHT SAPHENOFEMORAL JUNCTION SPONT: NORMAL
BH CV RIGHT LOWER VAS SAPHENOFEM JUNCTION REFLUX TIME: 2230 MSEC
BH CV VAS RIGHT GSV PROXIMAL HIDDEN LRR COMPRESSIBILTY: NORMAL
BILIRUB SERPL-MCNC: 0.78 MG/DL (ref 0.2–1.3)
BILIRUB SERPL-MCNC: 0.8 MG/DL (ref 0–1.2)
BILIRUB SERPL-MCNC: 0.9 MG/DL (ref 0–1.2)
BILIRUB SERPL-MCNC: 0.9 MG/DL (ref 0–1.2)
BILIRUB SERPL-MCNC: 0.94 MG/DL (ref 0.2–1.3)
BILIRUB SERPL-MCNC: 1.3 MG/DL (ref 0–1.2)
BILIRUB UR QL STRIP: NEGATIVE
BUN SERPL-MCNC: 12 MG/DL (ref 5–25)
BUN SERPL-MCNC: 17 MG/DL (ref 8–23)
BUN SERPL-MCNC: 7 MG/DL (ref 8–23)
BUN SERPL-MCNC: 8 MG/DL (ref 8–23)
BUN SERPL-MCNC: 8 MG/DL (ref 8–23)
BUN SERPL-MCNC: 9 MG/DL (ref 5–25)
BUN SERPL-MCNC: 9 MG/DL (ref 8–23)
BUN/CREAT SERPL: 10 {RATIO} (ref 6–20)
BUN/CREAT SERPL: 10.7 (ref 7–25)
BUN/CREAT SERPL: 11 (ref 7–25)
BUN/CREAT SERPL: 12.9 (ref 7–25)
BUN/CREAT SERPL: 14 {RATIO} (ref 6–20)
BUN/CREAT SERPL: 21 (ref 7–25)
BUN/CREAT SERPL: 9.6 (ref 7–25)
CA-I BLD-MCNC: 5.5 MG/DL (ref 4.6–5.4)
CA-I SERPL ISE-MCNC: 1.38 MMOL/L (ref 1.15–1.35)
CALCIUM SERPL-MCNC: 10.5 MG/DL (ref 8.7–10.4)
CALCIUM SERPL-MCNC: 9.8 MG/DL (ref 8.7–10.4)
CALCIUM SPEC-SCNC: 10 MG/DL (ref 8.6–10.5)
CALCIUM SPEC-SCNC: 10.3 MG/DL (ref 8.6–10.5)
CALCIUM SPEC-SCNC: 9.2 MG/DL (ref 8.6–10.5)
CALCIUM SPEC-SCNC: 9.6 MG/DL (ref 8.6–10.5)
CALCIUM SPEC-SCNC: 9.6 MG/DL (ref 8.6–10.5)
CALCIUM SPEC-SCNC: 9.7 MG/DL (ref 8.6–10.5)
CHLORIDE SERPL-SCNC: 100 MMOL/L (ref 98–107)
CHLORIDE SERPL-SCNC: 101 MMOL/L (ref 98–107)
CHLORIDE SERPL-SCNC: 102 MMOL/L (ref 98–107)
CHLORIDE SERPL-SCNC: 102 MMOL/L (ref 99–111)
CHLORIDE SERPL-SCNC: 103 MMOL/L (ref 99–111)
CHLORIDE SERPL-SCNC: 99 MMOL/L (ref 98–107)
CHLORIDE SERPL-SCNC: 99 MMOL/L (ref 98–107)
CHOLEST SERPL-MCNC: 104 MG/DL (ref 107–200)
CHOLEST SERPL-MCNC: 108 MG/DL (ref 107–200)
CHOLEST/HDLC SERPL: 2.5 {RATIO} (ref 3–6)
CHOLEST/HDLC SERPL: 2.7 {RATIO} (ref 3–6)
CLARITY UR: CLEAR
CO2 SERPL-SCNC: 25.2 MMOL/L (ref 22–29)
CO2 SERPL-SCNC: 27 MMOL/L (ref 22–29)
CO2 SERPL-SCNC: 27 MMOL/L (ref 22–29)
CO2 SERPL-SCNC: 27.3 MMOL/L (ref 22–29)
CO2 SERPL-SCNC: 29.1 MMOL/L (ref 22–29)
COLOR UR: YELLOW
CONV ABS IMM GRAN: 0.03 10*3/UL (ref 0–0.2)
CONV CO2: 28 MMOL/L (ref 22–32)
CONV CO2: 29 MMOL/L (ref 22–32)
CONV COCAINE, UR: NEGATIVE
CONV CREATININE URINE, RANDOM: 21.4 MG/DL (ref 10–300)
CONV IMMATURE GRAN: 0.4 % (ref 0–1.8)
CONV MICROALBUM.,U,RANDOM: <12 MG/L (ref 0–20)
CONV TOTAL PROTEIN: 7.5 G/DL (ref 6.3–8.2)
CONV TOTAL PROTEIN: 8.2 G/DL (ref 6.3–8.2)
CREAT SERPL-MCNC: 0.7 MG/DL (ref 0.57–1)
CREAT SERPL-MCNC: 0.73 MG/DL (ref 0.57–1)
CREAT SERPL-MCNC: 0.73 MG/DL (ref 0.57–1)
CREAT SERPL-MCNC: 0.75 MG/DL (ref 0.57–1)
CREAT SERPL-MCNC: 0.81 MG/DL (ref 0.57–1)
CREAT UR-MCNC: 0.86 MG/DL (ref 0.5–0.9)
CREAT UR-MCNC: 0.86 MG/DL (ref 0.5–0.9)
D DIMER PPP FEU-MCNC: 1.94 MG/L (FEU) (ref 0–0.59)
D-LACTATE SERPL-SCNC: 1.2 MMOL/L (ref 0.5–2)
DEPRECATED RDW RBC AUTO: 43.3 FL (ref 36.4–46.3)
DEPRECATED RDW RBC AUTO: 45.6 FL (ref 37–54)
DEPRECATED RDW RBC AUTO: 48.5 FL (ref 37–54)
DEPRECATED RDW RBC AUTO: 52.6 FL (ref 37–54)
DEPRECATED RDW RBC AUTO: 54.3 FL (ref 37–54)
EBV NA IGG SER IA-ACNC: 524 U/ML (ref 0–17.9)
EBV VCA IGG SER IA-ACNC: >600 U/ML (ref 0–17.9)
EBV VCA IGM SER IA-ACNC: <36 U/ML (ref 0–35.9)
EOSINOPHIL # BLD AUTO: 0.11 10*3/MM3 (ref 0–0.4)
EOSINOPHIL # BLD AUTO: 0.13 10*3/MM3 (ref 0–0.4)
EOSINOPHIL # BLD AUTO: 0.14 10*3/UL (ref 0–0.7)
EOSINOPHIL # BLD AUTO: 0.15 10*3/MM3 (ref 0–0.4)
EOSINOPHIL # BLD AUTO: 0.52 10*3/MM3 (ref 0–0.4)
EOSINOPHIL # BLD AUTO: 1.9 % (ref 0–7)
EOSINOPHIL NFR BLD AUTO: 1.4 % (ref 0.3–6.2)
EOSINOPHIL NFR BLD AUTO: 1.6 % (ref 0.3–6.2)
EOSINOPHIL NFR BLD AUTO: 1.9 % (ref 0.3–6.2)
EOSINOPHIL NFR BLD AUTO: 5.6 % (ref 0.3–6.2)
ERYTHROCYTE [DISTWIDTH] IN BLOOD BY AUTOMATED COUNT: 12.7 % (ref 11.7–14.4)
ERYTHROCYTE [DISTWIDTH] IN BLOOD BY AUTOMATED COUNT: 14.4 % (ref 12.3–15.4)
ERYTHROCYTE [DISTWIDTH] IN BLOOD BY AUTOMATED COUNT: 14.8 % (ref 12.3–15.4)
ERYTHROCYTE [DISTWIDTH] IN BLOOD BY AUTOMATED COUNT: 16.2 % (ref 12.3–15.4)
ERYTHROCYTE [DISTWIDTH] IN BLOOD BY AUTOMATED COUNT: 16.5 % (ref 12.3–15.4)
EXPIRATION DATE: NORMAL
FOLATE SERPL-MCNC: 10.6 NG/ML (ref 4.78–24.2)
GAMMA GLOB SERPL ELPH-MCNC: 1 G/DL (ref 0.4–1.8)
GFR SERPL CREATININE-BSD FRML MDRD: 70 ML/MIN/1.73
GFR SERPL CREATININE-BSD FRML MDRD: 77 ML/MIN/1.73
GFR SERPL CREATININE-BSD FRML MDRD: 79 ML/MIN/1.73
GFR SERPL CREATININE-BSD FRML MDRD: 79 ML/MIN/1.73
GFR SERPL CREATININE-BSD FRML MDRD: 83 ML/MIN/1.73
GFR SERPLBLD BASED ON 1.73 SQ M-ARVRAT: >60 ML/MIN/{1.73_M2}
GFR SERPLBLD BASED ON 1.73 SQ M-ARVRAT: >60 ML/MIN/{1.73_M2}
GLOBULIN SER CALC-MCNC: 3 G/DL (ref 2.2–3.9)
GLOBULIN UR ELPH-MCNC: 2.7 G/DL (ref 2–3.5)
GLOBULIN UR ELPH-MCNC: 2.8 GM/DL
GLOBULIN UR ELPH-MCNC: 2.9 GM/DL
GLOBULIN UR ELPH-MCNC: 3 GM/DL
GLOBULIN UR ELPH-MCNC: 3.4 GM/DL
GLOBULIN UR ELPH-MCNC: 3.5 G/DL (ref 2–3.5)
GLUCOSE BLDC GLUCOMTR-MCNC: 106 MG/DL (ref 70–99)
GLUCOSE BLDC GLUCOMTR-MCNC: 107 MG/DL (ref 70–99)
GLUCOSE BLDC GLUCOMTR-MCNC: 117 MG/DL (ref 70–99)
GLUCOSE BLDC GLUCOMTR-MCNC: 82 MG/DL (ref 70–99)
GLUCOSE SERPL-MCNC: 104 MG/DL (ref 65–99)
GLUCOSE SERPL-MCNC: 119 MG/DL (ref 65–99)
GLUCOSE SERPL-MCNC: 83 MG/DL (ref 65–99)
GLUCOSE SERPL-MCNC: 84 MG/DL (ref 65–99)
GLUCOSE SERPL-MCNC: 84 MG/DL (ref 65–99)
GLUCOSE SERPL-MCNC: 94 MG/DL (ref 65–99)
GLUCOSE SERPL-MCNC: 95 MG/DL (ref 65–99)
GLUCOSE UR STRIP-MCNC: NEGATIVE MG/DL
HBA1C MFR BLD: 6.8 % (ref 4.8–5.6)
HCT VFR BLD AUTO: 36.7 % (ref 34–46.6)
HCT VFR BLD AUTO: 37.1 % (ref 34–46.6)
HCT VFR BLD AUTO: 38.8 % (ref 34–46.6)
HCT VFR BLD AUTO: 40 % (ref 34–46.6)
HCT VFR BLD AUTO: 43.1 % (ref 37–47)
HCT VFR BLD AUTO: 47.8 % (ref 34–46.6)
HDLC SERPL-MCNC: 38 MG/DL (ref 40–60)
HDLC SERPL-MCNC: 44 MG/DL (ref 40–60)
HGB BLD-MCNC: 12 G/DL (ref 12–15.9)
HGB BLD-MCNC: 12.5 G/DL (ref 12–15.9)
HGB BLD-MCNC: 12.8 G/DL (ref 12–15.9)
HGB BLD-MCNC: 13 G/DL (ref 12–15.9)
HGB BLD-MCNC: 14.6 G/DL (ref 12–16)
HGB BLD-MCNC: 16.1 G/DL (ref 12–15.9)
HGB UR QL STRIP.AUTO: NEGATIVE
HOLD SPECIMEN: NORMAL
IMM GRANULOCYTES # BLD AUTO: 0.02 10*3/MM3 (ref 0–0.05)
IMM GRANULOCYTES # BLD AUTO: 0.04 10*3/MM3 (ref 0–0.05)
IMM GRANULOCYTES NFR BLD AUTO: 0.2 % (ref 0–0.5)
IMM GRANULOCYTES NFR BLD AUTO: 0.3 % (ref 0–0.5)
IMM GRANULOCYTES NFR BLD AUTO: 0.3 % (ref 0–0.5)
IMM GRANULOCYTES NFR BLD AUTO: 0.4 % (ref 0–0.5)
INR PPP: 1.17 (ref 2–3)
INTERNAL CONTROL: NORMAL
KETONES UR QL STRIP: NEGATIVE
LABORATORY COMMENT REPORT: NORMAL
LDLC SERPL CALC-MCNC: 37 MG/DL (ref 70–100)
LDLC SERPL CALC-MCNC: 38 MG/DL (ref 70–100)
LEUKOCYTE ESTERASE UR QL STRIP.AUTO: NEGATIVE
LYMPHOCYTES # BLD AUTO: 2.04 10*3/MM3 (ref 0.7–3.1)
LYMPHOCYTES # BLD AUTO: 2.11 10*3/MM3 (ref 0.7–3.1)
LYMPHOCYTES # BLD AUTO: 2.24 10*3/MM3 (ref 0.7–3.1)
LYMPHOCYTES # BLD AUTO: 2.28 10*3/MM3 (ref 0.7–3.1)
LYMPHOCYTES # BLD AUTO: 2.63 10*3/UL (ref 1–5)
LYMPHOCYTES NFR BLD AUTO: 22 % (ref 19.6–45.3)
LYMPHOCYTES NFR BLD AUTO: 26.6 % (ref 19.6–45.3)
LYMPHOCYTES NFR BLD AUTO: 27.5 % (ref 19.6–45.3)
LYMPHOCYTES NFR BLD AUTO: 28.5 % (ref 19.6–45.3)
LYMPHOCYTES NFR BLD AUTO: 35.3 % (ref 20–45)
Lab: NORMAL
M PROTEIN SERPL ELPH-MCNC: NORMAL G/DL
MAGNESIUM SERPL-MCNC: 2 MG/DL (ref 1.6–2.4)
MAGNESIUM SERPL-MCNC: 2.2 MG/DL (ref 1.6–2.4)
MAXIMAL PREDICTED HEART RATE: 152 BPM
MCH RBC QN AUTO: 29.2 PG (ref 26.6–33)
MCH RBC QN AUTO: 29.2 PG (ref 26.6–33)
MCH RBC QN AUTO: 29.8 PG (ref 26.6–33)
MCH RBC QN AUTO: 30.3 PG (ref 26.6–33)
MCH RBC QN AUTO: 31.9 PG (ref 27–31)
MCHC RBC AUTO-ENTMCNC: 32.5 G/DL (ref 31.5–35.7)
MCHC RBC AUTO-ENTMCNC: 33 G/DL (ref 31.5–35.7)
MCHC RBC AUTO-ENTMCNC: 33.7 G/DL (ref 31.5–35.7)
MCHC RBC AUTO-ENTMCNC: 33.7 G/DL (ref 31.5–35.7)
MCHC RBC AUTO-ENTMCNC: 33.9 G/DL (ref 33–37)
MCV RBC AUTO: 86.8 FL (ref 79–97)
MCV RBC AUTO: 89.8 FL (ref 79–97)
MCV RBC AUTO: 89.9 FL (ref 79–97)
MCV RBC AUTO: 90.2 FL (ref 79–97)
MCV RBC AUTO: 94.3 FL (ref 81–99)
METHADONE UR QL SCN: NEGATIVE
MICROALBUMIN/CREAT UR: 56.1 MG/G{CRE} (ref 0–35)
MONOCYTES # BLD AUTO: 0.72 10*3/UL (ref 0.2–1.2)
MONOCYTES # BLD AUTO: 0.81 10*3/MM3 (ref 0.1–0.9)
MONOCYTES # BLD AUTO: 0.85 10*3/MM3 (ref 0.1–0.9)
MONOCYTES # BLD AUTO: 1.03 10*3/MM3 (ref 0.1–0.9)
MONOCYTES # BLD AUTO: 1.25 10*3/MM3 (ref 0.1–0.9)
MONOCYTES NFR BLD AUTO: 10.6 % (ref 5–12)
MONOCYTES NFR BLD AUTO: 13 % (ref 5–12)
MONOCYTES NFR BLD AUTO: 13.5 % (ref 5–12)
MONOCYTES NFR BLD AUTO: 9.7 % (ref 3–10)
MONOCYTES NFR BLD AUTO: 9.9 % (ref 5–12)
NEUTROPHILS # BLD AUTO: 3.92 10*3/UL (ref 2–8)
NEUTROPHILS NFR BLD AUTO: 4.63 10*3/MM3 (ref 1.7–7)
NEUTROPHILS NFR BLD AUTO: 4.65 10*3/MM3 (ref 1.7–7)
NEUTROPHILS NFR BLD AUTO: 4.93 10*3/MM3 (ref 1.7–7)
NEUTROPHILS NFR BLD AUTO: 5.41 10*3/MM3 (ref 1.7–7)
NEUTROPHILS NFR BLD AUTO: 52.4 % (ref 30–85)
NEUTROPHILS NFR BLD AUTO: 58.1 % (ref 42.7–76)
NEUTROPHILS NFR BLD AUTO: 58.2 % (ref 42.7–76)
NEUTROPHILS NFR BLD AUTO: 58.3 % (ref 42.7–76)
NEUTROPHILS NFR BLD AUTO: 60.6 % (ref 42.7–76)
NITRITE UR QL STRIP: NEGATIVE
NRBC BLD AUTO-RTO: 0 /100 WBC (ref 0–0.2)
NRBC CBCN: 0 % (ref 0–0.7)
NT-PROBNP SERPL-MCNC: 772.3 PG/ML (ref 0–900)
OPIATES TESTED UR SCN: NEGATIVE
OSMOLALITY SERPL CALC.SUM OF ELEC: 295 MOSM/KG (ref 273–304)
OSMOLALITY SERPL CALC.SUM OF ELEC: 296 MOSM/KG (ref 273–304)
OXYCODONE UR QL SCN: NEGATIVE
PCP UR QL: NEGATIVE
PH UR STRIP.AUTO: 7 [PH] (ref 5–8)
PHOSPHATE SERPL-MCNC: 3.2 MG/DL (ref 2.5–4.5)
PLATELET # BLD AUTO: 181 10*3/UL (ref 130–400)
PLATELET # BLD AUTO: 187 10*3/MM3 (ref 140–450)
PLATELET # BLD AUTO: 215 10*3/MM3 (ref 140–450)
PLATELET # BLD AUTO: 220 10*3/MM3 (ref 140–450)
PLATELET # BLD AUTO: 249 10*3/MM3 (ref 140–450)
PMV BLD AUTO: 10.4 FL (ref 6–12)
PMV BLD AUTO: 10.5 FL (ref 9.4–12.3)
PMV BLD AUTO: 9.1 FL (ref 6–12)
PMV BLD AUTO: 9.3 FL (ref 6–12)
PMV BLD AUTO: 9.9 FL (ref 6–12)
POTASSIUM SERPL-SCNC: 3.3 MMOL/L (ref 3.5–5.2)
POTASSIUM SERPL-SCNC: 3.6 MMOL/L (ref 3.5–5.2)
POTASSIUM SERPL-SCNC: 3.7 MMOL/L (ref 3.5–5.2)
POTASSIUM SERPL-SCNC: 3.7 MMOL/L (ref 3.5–5.2)
POTASSIUM SERPL-SCNC: 3.8 MMOL/L (ref 3.5–5.3)
POTASSIUM SERPL-SCNC: 4.2 MMOL/L (ref 3.5–5.2)
POTASSIUM SERPL-SCNC: 4.3 MMOL/L (ref 3.5–5.3)
PROT SERPL-MCNC: 6.7 G/DL (ref 6–8.5)
PROT SERPL-MCNC: 7.1 G/DL (ref 6–8.5)
PROT SERPL-MCNC: 7.1 G/DL (ref 6–8.5)
PROT SERPL-MCNC: 7.3 G/DL (ref 6–8.5)
PROT SERPL-MCNC: 7.3 G/DL (ref 6–8.5)
PROT UR QL STRIP: NEGATIVE
PROTHROMBIN TIME: 12 SECONDS (ref 9.4–12)
PTH RELATED PROT SERPL-SCNC: <2 PMOL/L
PTH-INTACT SERPL-MCNC: 30.1 PG/ML (ref 15–65)
QT INTERVAL: 471 MS
RBC # BLD AUTO: 4.13 10*6/MM3 (ref 3.77–5.28)
RBC # BLD AUTO: 4.3 10*6/MM3 (ref 3.77–5.28)
RBC # BLD AUTO: 4.45 10*6/MM3 (ref 3.77–5.28)
RBC # BLD AUTO: 4.57 10*6/UL (ref 4.2–5.4)
RBC # BLD AUTO: 5.51 10*6/MM3 (ref 3.77–5.28)
SARS-COV-2 AG UPPER RESP QL IA.RAPID: NOT DETECTED
SARS-COV-2 N GENE RESP QL NAA+PROBE: NOT DETECTED
SARS-COV-2 N GENE RESP QL NAA+PROBE: NOT DETECTED
SERVICE CMNT-IMP: ABNORMAL
SODIUM SERPL-SCNC: 135 MMOL/L (ref 136–145)
SODIUM SERPL-SCNC: 135 MMOL/L (ref 136–145)
SODIUM SERPL-SCNC: 139 MMOL/L (ref 136–145)
SODIUM SERPL-SCNC: 139 MMOL/L (ref 136–145)
SODIUM SERPL-SCNC: 141 MMOL/L (ref 136–145)
SODIUM SERPL-SCNC: 143 MMOL/L (ref 135–147)
SODIUM SERPL-SCNC: 144 MMOL/L (ref 135–147)
SP GR UR STRIP: <=1.005 (ref 1–1.03)
STRESS TARGET HR: 129 BPM
T4 FREE SERPL-MCNC: 1.39 NG/DL (ref 0.93–1.7)
T4 FREE SERPL-MCNC: 1.6 NG/DL (ref 0.9–1.8)
T4 FREE SERPL-MCNC: 1.64 NG/DL (ref 0.93–1.7)
T4 FREE SERPL-MCNC: 2 NG/DL (ref 0.9–1.8)
THC SERPLBLD CFM-MCNC: NEGATIVE NG/ML
TRIGL SERPL-MCNC: 136 MG/DL (ref 40–150)
TRIGL SERPL-MCNC: 140 MG/DL (ref 40–150)
TSH SERPL DL<=0.05 MIU/L-ACNC: 0.1 UIU/ML (ref 0.27–4.2)
TSH SERPL DL<=0.05 MIU/L-ACNC: 2.83 UIU/ML (ref 0.27–4.2)
TSH SERPL-ACNC: 0.13 M[IU]/L (ref 0.27–4.2)
TSH SERPL-ACNC: 1.26 M[IU]/L (ref 0.27–4.2)
UROBILINOGEN UR QL STRIP: NORMAL
VIT B12 BLD-MCNC: 417 PG/ML (ref 211–946)
VLDLC SERPL-MCNC: 27 MG/DL (ref 5–37)
VLDLC SERPL-MCNC: 28 MG/DL (ref 5–37)
WBC # BLD AUTO: 7.46 10*3/UL (ref 4.8–10.8)
WBC # BLD AUTO: 9.29 10*3/MM3 (ref 3.4–10.8)
WBC NRBC COR # BLD: 7.93 10*3/MM3 (ref 3.4–10.8)
WBC NRBC COR # BLD: 8 10*3/MM3 (ref 3.4–10.8)
WBC NRBC COR # BLD: 8.15 10*3/MM3 (ref 3.4–10.8)
WHOLE BLOOD HOLD SPECIMEN: NORMAL

## 2021-01-01 PROCEDURE — 25010000002 KETOROLAC TROMETHAMINE PER 15 MG: Performed by: ORTHOPAEDIC SURGERY

## 2021-01-01 PROCEDURE — 99283 EMERGENCY DEPT VISIT LOW MDM: CPT

## 2021-01-01 PROCEDURE — 87040 BLOOD CULTURE FOR BACTERIA: CPT | Performed by: EMERGENCY MEDICINE

## 2021-01-01 PROCEDURE — 86665 EPSTEIN-BARR CAPSID VCA: CPT | Performed by: FAMILY MEDICINE

## 2021-01-01 PROCEDURE — 94761 N-INVAS EAR/PLS OXIMETRY MLT: CPT

## 2021-01-01 PROCEDURE — 97116 GAIT TRAINING THERAPY: CPT

## 2021-01-01 PROCEDURE — 99203 OFFICE O/P NEW LOW 30 MIN: CPT | Performed by: NURSE PRACTITIONER

## 2021-01-01 PROCEDURE — 99214 OFFICE O/P EST MOD 30 MIN: CPT | Performed by: INTERNAL MEDICINE

## 2021-01-01 PROCEDURE — 25010000002 EPINEPHRINE 1 MG/ML SOLUTION: Performed by: ORTHOPAEDIC SURGERY

## 2021-01-01 PROCEDURE — 25010000002 MIDAZOLAM PER 1MG: Performed by: ANESTHESIOLOGY

## 2021-01-01 PROCEDURE — 93971 EXTREMITY STUDY: CPT | Performed by: SURGERY

## 2021-01-01 PROCEDURE — 93280 PM DEVICE PROGR EVAL DUAL: CPT | Performed by: NURSE PRACTITIONER

## 2021-01-01 PROCEDURE — 25010000002 HYDROMORPHONE PER 4 MG: Performed by: NURSE ANESTHETIST, CERTIFIED REGISTERED

## 2021-01-01 PROCEDURE — 87635 SARS-COV-2 COVID-19 AMP PRB: CPT | Performed by: FAMILY MEDICINE

## 2021-01-01 PROCEDURE — 80053 COMPREHEN METABOLIC PANEL: CPT | Performed by: NURSE PRACTITIONER

## 2021-01-01 PROCEDURE — 82746 ASSAY OF FOLIC ACID SERUM: CPT | Performed by: FAMILY MEDICINE

## 2021-01-01 PROCEDURE — 99213 OFFICE O/P EST LOW 20 MIN: CPT | Performed by: FAMILY MEDICINE

## 2021-01-01 PROCEDURE — 85014 HEMATOCRIT: CPT | Performed by: ORTHOPAEDIC SURGERY

## 2021-01-01 PROCEDURE — 99212 OFFICE O/P EST SF 10 MIN: CPT | Performed by: NURSE PRACTITIONER

## 2021-01-01 PROCEDURE — 1125F AMNT PAIN NOTED PAIN PRSNT: CPT | Performed by: NURSE PRACTITIONER

## 2021-01-01 PROCEDURE — 93280 PM DEVICE PROGR EVAL DUAL: CPT | Performed by: INTERNAL MEDICINE

## 2021-01-01 PROCEDURE — 99213 OFFICE O/P EST LOW 20 MIN: CPT | Performed by: INTERNAL MEDICINE

## 2021-01-01 PROCEDURE — 99024 POSTOP FOLLOW-UP VISIT: CPT | Performed by: PHYSICIAN ASSISTANT

## 2021-01-01 PROCEDURE — 84155 ASSAY OF PROTEIN SERUM: CPT | Performed by: NURSE PRACTITIONER

## 2021-01-01 PROCEDURE — 84165 PROTEIN E-PHORESIS SERUM: CPT | Performed by: NURSE PRACTITIONER

## 2021-01-01 PROCEDURE — 80053 COMPREHEN METABOLIC PANEL: CPT | Performed by: FAMILY MEDICINE

## 2021-01-01 PROCEDURE — 99214 OFFICE O/P EST MOD 30 MIN: CPT | Performed by: NURSE PRACTITIONER

## 2021-01-01 PROCEDURE — 97165 OT EVAL LOW COMPLEX 30 MIN: CPT

## 2021-01-01 PROCEDURE — 85610 PROTHROMBIN TIME: CPT

## 2021-01-01 PROCEDURE — 83970 ASSAY OF PARATHORMONE: CPT | Performed by: NURSE PRACTITIONER

## 2021-01-01 PROCEDURE — 97161 PT EVAL LOW COMPLEX 20 MIN: CPT

## 2021-01-01 PROCEDURE — 85018 HEMOGLOBIN: CPT | Performed by: ORTHOPAEDIC SURGERY

## 2021-01-01 PROCEDURE — 36415 COLL VENOUS BLD VENIPUNCTURE: CPT | Performed by: FAMILY MEDICINE

## 2021-01-01 PROCEDURE — 81003 URINALYSIS AUTO W/O SCOPE: CPT | Performed by: EMERGENCY MEDICINE

## 2021-01-01 PROCEDURE — 80053 COMPREHEN METABOLIC PANEL: CPT

## 2021-01-01 PROCEDURE — 85025 COMPLETE CBC W/AUTO DIFF WBC: CPT

## 2021-01-01 PROCEDURE — 0 CEFAZOLIN IN DEXTROSE 2-4 GM/100ML-% SOLUTION: Performed by: ORTHOPAEDIC SURGERY

## 2021-01-01 PROCEDURE — 94799 UNLISTED PULMONARY SVC/PX: CPT

## 2021-01-01 PROCEDURE — 71250 CT THORAX DX C-: CPT

## 2021-01-01 PROCEDURE — 96365 THER/PROPH/DIAG IV INF INIT: CPT

## 2021-01-01 PROCEDURE — 96160 PT-FOCUSED HLTH RISK ASSMT: CPT | Performed by: NURSE PRACTITIONER

## 2021-01-01 PROCEDURE — 25010000002 HYDROMORPHONE 1 MG/ML SOLUTION: Performed by: NURSE ANESTHETIST, CERTIFIED REGISTERED

## 2021-01-01 PROCEDURE — 83036 HEMOGLOBIN GLYCOSYLATED A1C: CPT

## 2021-01-01 PROCEDURE — 82962 GLUCOSE BLOOD TEST: CPT

## 2021-01-01 PROCEDURE — 76000 FLUOROSCOPY <1 HR PHYS/QHP: CPT

## 2021-01-01 PROCEDURE — 1170F FXNL STATUS ASSESSED: CPT | Performed by: NURSE PRACTITIONER

## 2021-01-01 PROCEDURE — 86664 EPSTEIN-BARR NUCLEAR ANTIGEN: CPT | Performed by: FAMILY MEDICINE

## 2021-01-01 PROCEDURE — 83880 ASSAY OF NATRIURETIC PEPTIDE: CPT | Performed by: NURSE PRACTITIONER

## 2021-01-01 PROCEDURE — 87635 SARS-COV-2 COVID-19 AMP PRB: CPT | Performed by: EMERGENCY MEDICINE

## 2021-01-01 PROCEDURE — 84443 ASSAY THYROID STIM HORMONE: CPT | Performed by: NURSE PRACTITIONER

## 2021-01-01 PROCEDURE — 82397 CHEMILUMINESCENT ASSAY: CPT | Performed by: NURSE PRACTITIONER

## 2021-01-01 PROCEDURE — G0439 PPPS, SUBSEQ VISIT: HCPCS | Performed by: NURSE PRACTITIONER

## 2021-01-01 PROCEDURE — 83735 ASSAY OF MAGNESIUM: CPT | Performed by: FAMILY MEDICINE

## 2021-01-01 PROCEDURE — 85025 COMPLETE CBC W/AUTO DIFF WBC: CPT | Performed by: FAMILY MEDICINE

## 2021-01-01 PROCEDURE — 82607 VITAMIN B-12: CPT | Performed by: FAMILY MEDICINE

## 2021-01-01 PROCEDURE — 93294 REM INTERROG EVL PM/LDLS PM: CPT | Performed by: INTERNAL MEDICINE

## 2021-01-01 PROCEDURE — 73502 X-RAY EXAM HIP UNI 2-3 VIEWS: CPT

## 2021-01-01 PROCEDURE — 93005 ELECTROCARDIOGRAM TRACING: CPT | Performed by: ANESTHESIOLOGY

## 2021-01-01 PROCEDURE — C1776 JOINT DEVICE (IMPLANTABLE): HCPCS | Performed by: ORTHOPAEDIC SURGERY

## 2021-01-01 PROCEDURE — 25010000002 KETOROLAC TROMETHAMINE PER 15 MG

## 2021-01-01 PROCEDURE — 36415 COLL VENOUS BLD VENIPUNCTURE: CPT

## 2021-01-01 PROCEDURE — 25010000002 PROPOFOL 10 MG/ML EMULSION: Performed by: NURSE ANESTHETIST, CERTIFIED REGISTERED

## 2021-01-01 PROCEDURE — 25010000002 ONDANSETRON PER 1 MG: Performed by: ORTHOPAEDIC SURGERY

## 2021-01-01 PROCEDURE — 93296 REM INTERROG EVL PM/IDS: CPT | Performed by: INTERNAL MEDICINE

## 2021-01-01 PROCEDURE — 82310 ASSAY OF CALCIUM: CPT | Performed by: NURSE PRACTITIONER

## 2021-01-01 PROCEDURE — 85379 FIBRIN DEGRADATION QUANT: CPT | Performed by: NURSE PRACTITIONER

## 2021-01-01 PROCEDURE — 97535 SELF CARE MNGMENT TRAINING: CPT

## 2021-01-01 PROCEDURE — 25010000002 FENTANYL CITRATE (PF) 50 MCG/ML SOLUTION: Performed by: NURSE ANESTHETIST, CERTIFIED REGISTERED

## 2021-01-01 PROCEDURE — 93010 ELECTROCARDIOGRAM REPORT: CPT | Performed by: INTERNAL MEDICINE

## 2021-01-01 PROCEDURE — 87426 SARSCOV CORONAVIRUS AG IA: CPT | Performed by: FAMILY MEDICINE

## 2021-01-01 PROCEDURE — 99204 OFFICE O/P NEW MOD 45 MIN: CPT | Performed by: ORTHOPAEDIC SURGERY

## 2021-01-01 PROCEDURE — 99213 OFFICE O/P EST LOW 20 MIN: CPT | Performed by: NURSE PRACTITIONER

## 2021-01-01 PROCEDURE — 93000 ELECTROCARDIOGRAM COMPLETE: CPT | Performed by: NURSE PRACTITIONER

## 2021-01-01 PROCEDURE — 71045 X-RAY EXAM CHEST 1 VIEW: CPT

## 2021-01-01 PROCEDURE — 25010000002 ROPIVACAINE PER 1 MG

## 2021-01-01 PROCEDURE — 84443 ASSAY THYROID STIM HORMONE: CPT | Performed by: FAMILY MEDICINE

## 2021-01-01 PROCEDURE — 97530 THERAPEUTIC ACTIVITIES: CPT

## 2021-01-01 PROCEDURE — 99024 POSTOP FOLLOW-UP VISIT: CPT | Performed by: ORTHOPAEDIC SURGERY

## 2021-01-01 PROCEDURE — 25010000002 MORPHINE (PF) 10 MG/ML SOLUTION

## 2021-01-01 PROCEDURE — 25010000002 CEFAZOLIN 1-4 GM/50ML-% SOLUTION: Performed by: NURSE PRACTITIONER

## 2021-01-01 PROCEDURE — 25010000002 ENOXAPARIN PER 10 MG: Performed by: ORTHOPAEDIC SURGERY

## 2021-01-01 PROCEDURE — 83605 ASSAY OF LACTIC ACID: CPT | Performed by: EMERGENCY MEDICINE

## 2021-01-01 PROCEDURE — 84439 ASSAY OF FREE THYROXINE: CPT | Performed by: NURSE PRACTITIONER

## 2021-01-01 PROCEDURE — 85025 COMPLETE CBC W/AUTO DIFF WBC: CPT | Performed by: NURSE PRACTITIONER

## 2021-01-01 PROCEDURE — 27130 TOTAL HIP ARTHROPLASTY: CPT | Performed by: ORTHOPAEDIC SURGERY

## 2021-01-01 PROCEDURE — 94640 AIRWAY INHALATION TREATMENT: CPT

## 2021-01-01 PROCEDURE — 93971 EXTREMITY STUDY: CPT

## 2021-01-01 PROCEDURE — 83735 ASSAY OF MAGNESIUM: CPT | Performed by: NURSE PRACTITIONER

## 2021-01-01 PROCEDURE — 96366 THER/PROPH/DIAG IV INF ADDON: CPT

## 2021-01-01 PROCEDURE — 80053 COMPREHEN METABOLIC PANEL: CPT | Performed by: EMERGENCY MEDICINE

## 2021-01-01 PROCEDURE — 84100 ASSAY OF PHOSPHORUS: CPT | Performed by: NURSE PRACTITIONER

## 2021-01-01 PROCEDURE — 87040 BLOOD CULTURE FOR BACTERIA: CPT

## 2021-01-01 PROCEDURE — 80048 BASIC METABOLIC PNL TOTAL CA: CPT | Performed by: ORTHOPAEDIC SURGERY

## 2021-01-01 PROCEDURE — 84439 ASSAY OF FREE THYROXINE: CPT | Performed by: FAMILY MEDICINE

## 2021-01-01 PROCEDURE — 82330 ASSAY OF CALCIUM: CPT | Performed by: NURSE PRACTITIONER

## 2021-01-01 DEVICE — LINER ACET G7 VIVACIT/E NTRL HXPE SZD 36MM: Type: IMPLANTABLE DEVICE | Site: HIP | Status: FUNCTIONAL

## 2021-01-01 DEVICE — ADAPT HIP BIOLOX OPTN TYPE1 TPR MIN 3: Type: IMPLANTABLE DEVICE | Site: HIP | Status: FUNCTIONAL

## 2021-01-01 DEVICE — HD FEM/HIP G7 BIOLOX/DELTA OPTN 36MM: Type: IMPLANTABLE DEVICE | Site: HIP | Status: FUNCTIONAL

## 2021-01-01 DEVICE — TOTAL HIP PRIMARY: Type: IMPLANTABLE DEVICE | Site: HIP | Status: FUNCTIONAL

## 2021-01-01 DEVICE — SCRW ACET CORT TRILOGY S/TAP 6.5X25: Type: IMPLANTABLE DEVICE | Site: HIP | Status: FUNCTIONAL

## 2021-01-01 DEVICE — SHLL ACET G7 PPS LTD/HL TI SZC 50MM: Type: IMPLANTABLE DEVICE | Site: HIP | Status: FUNCTIONAL

## 2021-01-01 RX ORDER — BUDESONIDE 0.5 MG/2ML
2 INHALANT ORAL
COMMUNITY

## 2021-01-01 RX ORDER — HYDROCODONE BITARTRATE AND ACETAMINOPHEN 7.5; 325 MG/1; MG/1
1-2 TABLET ORAL EVERY 4 HOURS PRN
Qty: 40 TABLET | Refills: 0 | Status: SHIPPED | OUTPATIENT
Start: 2021-01-01 | End: 2021-01-01 | Stop reason: SDUPTHER

## 2021-01-01 RX ORDER — CEFDINIR 300 MG/1
300 CAPSULE ORAL 2 TIMES DAILY
Qty: 14 CAPSULE | Refills: 0 | Status: SHIPPED | OUTPATIENT
Start: 2021-01-01 | End: 2021-01-01

## 2021-01-01 RX ORDER — PLECANATIDE 3 MG/1
3 TABLET ORAL DAILY
Qty: 90 TABLET | Refills: 1 | Status: SHIPPED | OUTPATIENT
Start: 2021-01-01 | End: 2021-01-01

## 2021-01-01 RX ORDER — PROMETHAZINE HYDROCHLORIDE 12.5 MG/1
12.5 TABLET ORAL EVERY 6 HOURS PRN
Status: DISCONTINUED | OUTPATIENT
Start: 2021-01-01 | End: 2021-01-01 | Stop reason: HOSPADM

## 2021-01-01 RX ORDER — LEVOFLOXACIN 500 MG/1
500 TABLET, FILM COATED ORAL DAILY
Qty: 10 TABLET | Refills: 0 | Status: SHIPPED | OUTPATIENT
Start: 2021-01-01 | End: 2022-01-01

## 2021-01-01 RX ORDER — ISAVUCONAZONIUM SULFATE 186 MG/1
CAPSULE ORAL
COMMUNITY
End: 2021-01-01

## 2021-01-01 RX ORDER — ROCURONIUM BROMIDE 10 MG/ML
INJECTION, SOLUTION INTRAVENOUS AS NEEDED
Status: DISCONTINUED | OUTPATIENT
Start: 2021-01-01 | End: 2021-01-01 | Stop reason: SURG

## 2021-01-01 RX ORDER — TRANEXAMIC ACID 10 MG/ML
1000 INJECTION, SOLUTION INTRAVENOUS ONCE
Status: CANCELLED | OUTPATIENT
Start: 2021-01-01 | End: 2021-01-01

## 2021-01-01 RX ORDER — LEVOTHYROXINE SODIUM 0.05 MG/1
TABLET ORAL
Qty: 90 TABLET | Refills: 0 | Status: SHIPPED | OUTPATIENT
Start: 2021-01-01 | End: 2021-01-01 | Stop reason: SDUPTHER

## 2021-01-01 RX ORDER — TEMAZEPAM 15 MG/1
30 CAPSULE ORAL NIGHTLY PRN
Status: DISCONTINUED | OUTPATIENT
Start: 2021-01-01 | End: 2021-01-01 | Stop reason: HOSPADM

## 2021-01-01 RX ORDER — KETOROLAC TROMETHAMINE 30 MG/ML
15 INJECTION, SOLUTION INTRAMUSCULAR; INTRAVENOUS EVERY 6 HOURS
Status: COMPLETED | OUTPATIENT
Start: 2021-01-01 | End: 2021-01-01

## 2021-01-01 RX ORDER — FAMOTIDINE 20 MG/1
40 TABLET, FILM COATED ORAL DAILY
Status: DISCONTINUED | OUTPATIENT
Start: 2021-01-01 | End: 2021-01-01 | Stop reason: HOSPADM

## 2021-01-01 RX ORDER — BACLOFEN 10 MG/1
10 TABLET ORAL 3 TIMES DAILY PRN
COMMUNITY
Start: 2021-01-01 | End: 2021-01-01

## 2021-01-01 RX ORDER — TEMAZEPAM 30 MG/1
30 CAPSULE ORAL NIGHTLY PRN
Qty: 30 CAPSULE | Refills: 2 | Status: SHIPPED | OUTPATIENT
Start: 2021-01-01

## 2021-01-01 RX ORDER — MIDAZOLAM HYDROCHLORIDE 2 MG/2ML
2 INJECTION, SOLUTION INTRAMUSCULAR; INTRAVENOUS ONCE
Status: COMPLETED | OUTPATIENT
Start: 2021-01-01 | End: 2021-01-01

## 2021-01-01 RX ORDER — METHYLPREDNISOLONE 4 MG/1
TABLET ORAL
COMMUNITY
Start: 2021-01-01 | End: 2021-01-01

## 2021-01-01 RX ORDER — FUROSEMIDE 40 MG/1
TABLET ORAL
Qty: 90 TABLET | Refills: 1 | Status: SHIPPED | OUTPATIENT
Start: 2021-01-01 | End: 2022-01-01

## 2021-01-01 RX ORDER — DOFETILIDE 0.5 MG/1
CAPSULE ORAL
COMMUNITY
End: 2021-01-01

## 2021-01-01 RX ORDER — CEFAZOLIN SODIUM 2 G/100ML
2 INJECTION, SOLUTION INTRAVENOUS ONCE
Status: CANCELLED | OUTPATIENT
Start: 2021-01-01 | End: 2021-01-01

## 2021-01-01 RX ORDER — PROMETHAZINE HYDROCHLORIDE 12.5 MG/1
12.5 SUPPOSITORY RECTAL EVERY 6 HOURS PRN
Status: DISCONTINUED | OUTPATIENT
Start: 2021-01-01 | End: 2021-01-01 | Stop reason: HOSPADM

## 2021-01-01 RX ORDER — ROSUVASTATIN CALCIUM 20 MG/1
TABLET, COATED ORAL
Qty: 90 TABLET | Refills: 3 | Status: SHIPPED | OUTPATIENT
Start: 2021-01-01

## 2021-01-01 RX ORDER — ACETAMINOPHEN 500 MG
1000 TABLET ORAL EVERY 8 HOURS
Status: DISCONTINUED | OUTPATIENT
Start: 2021-01-01 | End: 2021-01-01 | Stop reason: HOSPADM

## 2021-01-01 RX ORDER — SODIUM CHLORIDE 0.9 % (FLUSH) 0.9 %
10 SYRINGE (ML) INJECTION AS NEEDED
Status: DISCONTINUED | OUTPATIENT
Start: 2021-01-01 | End: 2021-01-01 | Stop reason: HOSPADM

## 2021-01-01 RX ORDER — MEPERIDINE HYDROCHLORIDE 25 MG/ML
12.5 INJECTION INTRAMUSCULAR; INTRAVENOUS; SUBCUTANEOUS
Status: DISCONTINUED | OUTPATIENT
Start: 2021-01-01 | End: 2021-01-01

## 2021-01-01 RX ORDER — SODIUM CHLORIDE 0.9 % (FLUSH) 0.9 %
10 SYRINGE (ML) INJECTION EVERY 12 HOURS SCHEDULED
Status: DISCONTINUED | OUTPATIENT
Start: 2021-01-01 | End: 2021-01-01 | Stop reason: HOSPADM

## 2021-01-01 RX ORDER — HYDROMORPHONE HCL 110MG/55ML
PATIENT CONTROLLED ANALGESIA SYRINGE INTRAVENOUS AS NEEDED
Status: DISCONTINUED | OUTPATIENT
Start: 2021-01-01 | End: 2021-01-01 | Stop reason: SURG

## 2021-01-01 RX ORDER — PROPOFOL 10 MG/ML
VIAL (ML) INTRAVENOUS AS NEEDED
Status: DISCONTINUED | OUTPATIENT
Start: 2021-01-01 | End: 2021-01-01 | Stop reason: SURG

## 2021-01-01 RX ORDER — ACETAMINOPHEN 500 MG
1000 TABLET ORAL ONCE
Status: COMPLETED | OUTPATIENT
Start: 2021-01-01 | End: 2021-01-01

## 2021-01-01 RX ORDER — ONDANSETRON 2 MG/ML
4 INJECTION INTRAMUSCULAR; INTRAVENOUS EVERY 6 HOURS PRN
Status: DISCONTINUED | OUTPATIENT
Start: 2021-01-01 | End: 2021-01-01 | Stop reason: HOSPADM

## 2021-01-01 RX ORDER — APIXABAN 5 MG/1
TABLET, FILM COATED ORAL
Qty: 180 TABLET | Refills: 3 | Status: SHIPPED | OUTPATIENT
Start: 2021-01-01

## 2021-01-01 RX ORDER — CEPHALEXIN 500 MG/1
500 CAPSULE ORAL 4 TIMES DAILY
Qty: 40 CAPSULE | Refills: 0 | Status: SHIPPED | OUTPATIENT
Start: 2021-01-01

## 2021-01-01 RX ORDER — PHENYLEPHRINE HCL IN 0.9% NACL 1 MG/10 ML
SYRINGE (ML) INTRAVENOUS AS NEEDED
Status: DISCONTINUED | OUTPATIENT
Start: 2021-01-01 | End: 2021-01-01 | Stop reason: SURG

## 2021-01-01 RX ORDER — OXYCODONE HYDROCHLORIDE 5 MG/1
5 TABLET ORAL
Status: DISCONTINUED | OUTPATIENT
Start: 2021-01-01 | End: 2021-01-01

## 2021-01-01 RX ORDER — AMOXICILLIN 250 MG
2 CAPSULE ORAL 2 TIMES DAILY
Status: DISCONTINUED | OUTPATIENT
Start: 2021-01-01 | End: 2021-01-01 | Stop reason: HOSPADM

## 2021-01-01 RX ORDER — BUDESONIDE 0.5 MG/2ML
0.5 INHALANT ORAL
Status: DISCONTINUED | OUTPATIENT
Start: 2021-01-01 | End: 2021-01-01 | Stop reason: HOSPADM

## 2021-01-01 RX ORDER — DOFETILIDE 0.12 MG/1
500 CAPSULE ORAL 2 TIMES DAILY
Status: DISCONTINUED | OUTPATIENT
Start: 2021-01-01 | End: 2021-01-01 | Stop reason: HOSPADM

## 2021-01-01 RX ORDER — GABAPENTIN 600 MG/1
600 TABLET ORAL 3 TIMES DAILY
Qty: 90 TABLET | Refills: 1 | Status: SHIPPED | OUTPATIENT
Start: 2021-01-01 | End: 2021-01-01

## 2021-01-01 RX ORDER — CEFAZOLIN SODIUM 2 G/100ML
2 INJECTION, SOLUTION INTRAVENOUS ONCE
Status: COMPLETED | OUTPATIENT
Start: 2021-01-01 | End: 2021-01-01

## 2021-01-01 RX ORDER — DEXTROSE MONOHYDRATE 100 MG/ML
25 INJECTION, SOLUTION INTRAVENOUS
Status: DISCONTINUED | OUTPATIENT
Start: 2021-01-01 | End: 2021-01-01 | Stop reason: HOSPADM

## 2021-01-01 RX ORDER — CEFAZOLIN SODIUM 2 G/100ML
2 INJECTION, SOLUTION INTRAVENOUS EVERY 8 HOURS
Status: COMPLETED | OUTPATIENT
Start: 2021-01-01 | End: 2021-01-01

## 2021-01-01 RX ORDER — FENTANYL CITRATE 50 UG/ML
INJECTION, SOLUTION INTRAMUSCULAR; INTRAVENOUS AS NEEDED
Status: DISCONTINUED | OUTPATIENT
Start: 2021-01-01 | End: 2021-01-01 | Stop reason: SURG

## 2021-01-01 RX ORDER — PREDNISONE 20 MG/1
60 TABLET ORAL DAILY
Qty: 15 TABLET | Refills: 0 | Status: SHIPPED | OUTPATIENT
Start: 2021-01-01 | End: 2021-01-01

## 2021-01-01 RX ORDER — NALOXONE HCL 0.4 MG/ML
0.4 VIAL (ML) INJECTION
Status: DISCONTINUED | OUTPATIENT
Start: 2021-01-01 | End: 2021-01-01

## 2021-01-01 RX ORDER — MONTELUKAST SODIUM 10 MG/1
TABLET ORAL
COMMUNITY
End: 2021-01-01

## 2021-01-01 RX ORDER — HYDROCODONE BITARTRATE AND ACETAMINOPHEN 7.5; 325 MG/1; MG/1
TABLET ORAL
Qty: 40 TABLET | Refills: 0 | Status: SHIPPED | OUTPATIENT
Start: 2021-01-01 | End: 2022-01-01 | Stop reason: SDUPTHER

## 2021-01-01 RX ORDER — SODIUM CHLORIDE, SODIUM LACTATE, POTASSIUM CHLORIDE, CALCIUM CHLORIDE 600; 310; 30; 20 MG/100ML; MG/100ML; MG/100ML; MG/100ML
9 INJECTION, SOLUTION INTRAVENOUS CONTINUOUS PRN
Status: DISCONTINUED | OUTPATIENT
Start: 2021-01-01 | End: 2021-01-01 | Stop reason: HOSPADM

## 2021-01-01 RX ORDER — ARFORMOTEROL TARTRATE 15 UG/2ML
15 SOLUTION RESPIRATORY (INHALATION)
COMMUNITY

## 2021-01-01 RX ORDER — DOXYCYCLINE HYCLATE 100 MG/1
100 CAPSULE ORAL 2 TIMES DAILY
Qty: 20 CAPSULE | Refills: 0 | Status: SHIPPED | OUTPATIENT
Start: 2021-01-01

## 2021-01-01 RX ORDER — ARFORMOTEROL TARTRATE 15 UG/2ML
15 SOLUTION RESPIRATORY (INHALATION)
Status: DISCONTINUED | OUTPATIENT
Start: 2021-01-01 | End: 2021-01-01 | Stop reason: HOSPADM

## 2021-01-01 RX ORDER — DOFETILIDE 0.5 MG/1
CAPSULE ORAL
Qty: 180 CAPSULE | Refills: 3 | Status: SHIPPED | OUTPATIENT
Start: 2021-01-01

## 2021-01-01 RX ORDER — POLYETHYLENE GLYCOL 3350 17 G/17G
17 POWDER, FOR SOLUTION ORAL
COMMUNITY
End: 2021-01-01

## 2021-01-01 RX ORDER — BISACODYL 10 MG
10 SUPPOSITORY, RECTAL RECTAL DAILY PRN
Status: DISCONTINUED | OUTPATIENT
Start: 2021-01-01 | End: 2021-01-01 | Stop reason: HOSPADM

## 2021-01-01 RX ORDER — ACETAMINOPHEN 325 MG/1
325 TABLET ORAL EVERY 4 HOURS PRN
Status: DISCONTINUED | OUTPATIENT
Start: 2021-01-01 | End: 2021-01-01 | Stop reason: HOSPADM

## 2021-01-01 RX ORDER — LEVOTHYROXINE SODIUM 0.05 MG/1
50 TABLET ORAL
Qty: 90 TABLET | Refills: 1 | Status: SHIPPED | OUTPATIENT
Start: 2021-01-01

## 2021-01-01 RX ORDER — ALBUTEROL SULFATE 90 UG/1
2 AEROSOL, METERED RESPIRATORY (INHALATION) EVERY 6 HOURS PRN
Qty: 18 G | Refills: 1 | Status: SHIPPED | OUTPATIENT
Start: 2021-01-01

## 2021-01-01 RX ORDER — TIOTROPIUM BROMIDE INHALATION SPRAY 3.12 UG/1
2 SPRAY, METERED RESPIRATORY (INHALATION)
COMMUNITY

## 2021-01-01 RX ORDER — LEVOTHYROXINE SODIUM 0.05 MG/1
TABLET ORAL
Qty: 90 TABLET | Refills: 0 | Status: SHIPPED | OUTPATIENT
Start: 2021-01-01 | End: 2021-01-01

## 2021-01-01 RX ORDER — TRIAMCINOLONE ACETONIDE 1 MG/G
CREAM TOPICAL
COMMUNITY
End: 2021-01-01

## 2021-01-01 RX ORDER — NICOTINE POLACRILEX 4 MG
15 LOZENGE BUCCAL
Status: DISCONTINUED | OUTPATIENT
Start: 2021-01-01 | End: 2021-01-01 | Stop reason: HOSPADM

## 2021-01-01 RX ORDER — TEMAZEPAM 30 MG/1
30 CAPSULE ORAL NIGHTLY PRN
Qty: 30 CAPSULE | Refills: 2 | Status: SHIPPED | OUTPATIENT
Start: 2021-01-01 | End: 2021-01-01 | Stop reason: SDUPTHER

## 2021-01-01 RX ORDER — PROMETHAZINE HYDROCHLORIDE 25 MG/1
25 SUPPOSITORY RECTAL ONCE AS NEEDED
Status: DISCONTINUED | OUTPATIENT
Start: 2021-01-01 | End: 2021-01-01

## 2021-01-01 RX ORDER — CLOBETASOL PROPIONATE 0.5 MG/G
CREAM TOPICAL
COMMUNITY
End: 2021-01-01

## 2021-01-01 RX ORDER — ALBUTEROL SULFATE 2.5 MG/3ML
2.5 SOLUTION RESPIRATORY (INHALATION) EVERY 4 HOURS PRN
COMMUNITY

## 2021-01-01 RX ORDER — ROSUVASTATIN CALCIUM 20 MG/1
20 TABLET, COATED ORAL NIGHTLY
Status: DISCONTINUED | OUTPATIENT
Start: 2021-01-01 | End: 2021-01-01 | Stop reason: HOSPADM

## 2021-01-01 RX ORDER — ALBUTEROL SULFATE 2.5 MG/3ML
2.5 SOLUTION RESPIRATORY (INHALATION) EVERY 6 HOURS PRN
Status: DISCONTINUED | OUTPATIENT
Start: 2021-01-01 | End: 2021-01-01 | Stop reason: HOSPADM

## 2021-01-01 RX ORDER — BUDESONIDE 0.5 MG/2ML
0.5 INHALANT ORAL
COMMUNITY
End: 2021-01-01 | Stop reason: SDUPTHER

## 2021-01-01 RX ORDER — TIOTROPIUM BROMIDE INHALATION SPRAY 3.12 UG/1
SPRAY, METERED RESPIRATORY (INHALATION)
COMMUNITY
Start: 2021-01-01 | End: 2021-01-01 | Stop reason: SDUPTHER

## 2021-01-01 RX ORDER — PREDNISONE 20 MG/1
TABLET ORAL
COMMUNITY
End: 2021-01-01

## 2021-01-01 RX ORDER — HYDROCODONE BITARTRATE AND ACETAMINOPHEN 7.5; 325 MG/1; MG/1
1-2 TABLET ORAL EVERY 4 HOURS PRN
Qty: 40 TABLET | Refills: 0 | Status: SHIPPED | OUTPATIENT
Start: 2021-01-01 | End: 2021-01-01

## 2021-01-01 RX ORDER — CEFAZOLIN SODIUM 1 G/50ML
1 INJECTION, SOLUTION INTRAVENOUS ONCE
Status: COMPLETED | OUTPATIENT
Start: 2021-01-01 | End: 2021-01-01

## 2021-01-01 RX ORDER — DOFETILIDE 0.5 MG/1
CAPSULE ORAL
Qty: 180 CAPSULE | Refills: 1 | Status: SHIPPED | OUTPATIENT
Start: 2021-01-01 | End: 2021-01-01 | Stop reason: SDUPTHER

## 2021-01-01 RX ORDER — FUROSEMIDE 40 MG/1
TABLET ORAL
COMMUNITY
End: 2021-01-01

## 2021-01-01 RX ORDER — MAGNESIUM HYDROXIDE 1200 MG/15ML
LIQUID ORAL AS NEEDED
Status: DISCONTINUED | OUTPATIENT
Start: 2021-01-01 | End: 2021-01-01 | Stop reason: HOSPADM

## 2021-01-01 RX ORDER — ONDANSETRON 2 MG/ML
4 INJECTION INTRAMUSCULAR; INTRAVENOUS ONCE AS NEEDED
Status: DISCONTINUED | OUTPATIENT
Start: 2021-01-01 | End: 2021-01-01

## 2021-01-01 RX ORDER — CEFAZOLIN SODIUM IN 0.9 % NACL 3 G/100 ML
3 INTRAVENOUS SOLUTION, PIGGYBACK (ML) INTRAVENOUS ONCE
Status: CANCELLED | OUTPATIENT
Start: 2021-01-01 | End: 2021-01-01

## 2021-01-01 RX ORDER — ONDANSETRON 4 MG/1
4 TABLET, FILM COATED ORAL EVERY 6 HOURS PRN
Status: DISCONTINUED | OUTPATIENT
Start: 2021-01-01 | End: 2021-01-01 | Stop reason: HOSPADM

## 2021-01-01 RX ORDER — LIDOCAINE HYDROCHLORIDE 20 MG/ML
INJECTION, SOLUTION INFILTRATION; PERINEURAL AS NEEDED
Status: DISCONTINUED | OUTPATIENT
Start: 2021-01-01 | End: 2021-01-01 | Stop reason: SURG

## 2021-01-01 RX ORDER — IPRATROPIUM BROMIDE AND ALBUTEROL SULFATE 2.5; .5 MG/3ML; MG/3ML
3 SOLUTION RESPIRATORY (INHALATION)
COMMUNITY
End: 2021-01-01 | Stop reason: SDUPTHER

## 2021-01-01 RX ORDER — SODIUM CHLORIDE, SODIUM LACTATE, POTASSIUM CHLORIDE, CALCIUM CHLORIDE 600; 310; 30; 20 MG/100ML; MG/100ML; MG/100ML; MG/100ML
100 INJECTION, SOLUTION INTRAVENOUS CONTINUOUS
Status: DISCONTINUED | OUTPATIENT
Start: 2021-01-01 | End: 2021-01-01

## 2021-01-01 RX ORDER — SACCHAROMYCES BOULARDII 250 MG
250 CAPSULE ORAL 2 TIMES DAILY
Qty: 20 CAPSULE | Refills: 0 | Status: SHIPPED | OUTPATIENT
Start: 2021-01-01 | End: 2021-01-01

## 2021-01-01 RX ORDER — HYDROCODONE BITARTRATE AND ACETAMINOPHEN 7.5; 325 MG/1; MG/1
1 TABLET ORAL EVERY 4 HOURS PRN
Status: DISCONTINUED | OUTPATIENT
Start: 2021-01-01 | End: 2021-01-01 | Stop reason: HOSPADM

## 2021-01-01 RX ORDER — ACETAMINOPHEN 325 MG/1
975 TABLET ORAL ONCE
Status: COMPLETED | OUTPATIENT
Start: 2021-01-01 | End: 2021-01-01

## 2021-01-01 RX ORDER — GABAPENTIN 300 MG/1
300 CAPSULE ORAL 3 TIMES DAILY
Qty: 90 CAPSULE | Refills: 1 | Status: SHIPPED | OUTPATIENT
Start: 2021-01-01 | End: 2021-01-01

## 2021-01-01 RX ORDER — TRAMADOL HYDROCHLORIDE 50 MG/1
TABLET ORAL
COMMUNITY
Start: 2021-01-01 | End: 2021-01-01

## 2021-01-01 RX ORDER — CEFAZOLIN SODIUM IN 0.9 % NACL 3 G/100 ML
3 INTRAVENOUS SOLUTION, PIGGYBACK (ML) INTRAVENOUS ONCE
Status: DISCONTINUED | OUTPATIENT
Start: 2021-01-01 | End: 2021-01-01

## 2021-01-01 RX ORDER — GABAPENTIN 600 MG/1
600 TABLET ORAL ONCE
Status: COMPLETED | OUTPATIENT
Start: 2021-01-01 | End: 2021-01-01

## 2021-01-01 RX ORDER — ARFORMOTEROL TARTRATE 15 UG/2ML
2 SOLUTION RESPIRATORY (INHALATION)
COMMUNITY
End: 2021-01-01 | Stop reason: SDUPTHER

## 2021-01-01 RX ORDER — SPIRONOLACTONE 50 MG/1
TABLET, FILM COATED ORAL
Qty: 90 TABLET | Refills: 0 | Status: SHIPPED | OUTPATIENT
Start: 2021-01-01

## 2021-01-01 RX ORDER — PROMETHAZINE HYDROCHLORIDE 12.5 MG/1
25 TABLET ORAL ONCE AS NEEDED
Status: DISCONTINUED | OUTPATIENT
Start: 2021-01-01 | End: 2021-01-01

## 2021-01-01 RX ORDER — HYDROCODONE BITARTRATE AND ACETAMINOPHEN 7.5; 325 MG/1; MG/1
2 TABLET ORAL EVERY 4 HOURS PRN
Status: DISCONTINUED | OUTPATIENT
Start: 2021-01-01 | End: 2021-01-01 | Stop reason: HOSPADM

## 2021-01-01 RX ADMIN — SODIUM CHLORIDE, POTASSIUM CHLORIDE, SODIUM LACTATE AND CALCIUM CHLORIDE 100 ML/HR: 600; 310; 30; 20 INJECTION, SOLUTION INTRAVENOUS at 13:06

## 2021-01-01 RX ADMIN — HYDROCODONE BITARTRATE AND ACETAMINOPHEN 1 TABLET: 7.5; 325 TABLET ORAL at 11:00

## 2021-01-01 RX ADMIN — SENNOSIDES AND DOCUSATE SODIUM 2 TABLET: 50; 8.6 TABLET ORAL at 21:49

## 2021-01-01 RX ADMIN — HYDROCODONE BITARTRATE AND ACETAMINOPHEN 1 TABLET: 7.5; 325 TABLET ORAL at 04:55

## 2021-01-01 RX ADMIN — ROCURONIUM BROMIDE 20 MG: 10 INJECTION INTRAVENOUS at 09:46

## 2021-01-01 RX ADMIN — KETOROLAC TROMETHAMINE 15 MG: 30 INJECTION, SOLUTION INTRAMUSCULAR; INTRAVENOUS at 08:29

## 2021-01-01 RX ADMIN — ROCURONIUM BROMIDE 10 MG: 10 INJECTION INTRAVENOUS at 10:10

## 2021-01-01 RX ADMIN — SODIUM CHLORIDE, PRESERVATIVE FREE 10 ML: 5 INJECTION INTRAVENOUS at 08:34

## 2021-01-01 RX ADMIN — ONDANSETRON 4 MG: 2 INJECTION INTRAMUSCULAR; INTRAVENOUS at 13:40

## 2021-01-01 RX ADMIN — TRANEXAMIC ACID 1000 MG: 100 INJECTION, SOLUTION INTRAVENOUS at 10:25

## 2021-01-01 RX ADMIN — BUDESONIDE 0.5 MG: 0.5 SUSPENSION RESPIRATORY (INHALATION) at 09:47

## 2021-01-01 RX ADMIN — ARFORMOTEROL TARTRATE 15 MCG: 15 SOLUTION RESPIRATORY (INHALATION) at 09:47

## 2021-01-01 RX ADMIN — HYDROMORPHONE HYDROCHLORIDE 0.5 MG: 2 INJECTION, SOLUTION INTRAMUSCULAR; INTRAVENOUS; SUBCUTANEOUS at 10:52

## 2021-01-01 RX ADMIN — ACETAMINOPHEN 1000 MG: 500 TABLET ORAL at 00:33

## 2021-01-01 RX ADMIN — KETOROLAC TROMETHAMINE 15 MG: 30 INJECTION, SOLUTION INTRAMUSCULAR; INTRAVENOUS at 13:04

## 2021-01-01 RX ADMIN — FAMOTIDINE 40 MG: 20 TABLET, FILM COATED ORAL at 08:30

## 2021-01-01 RX ADMIN — FENTANYL CITRATE 50 MCG: 50 INJECTION, SOLUTION INTRAMUSCULAR; INTRAVENOUS at 09:25

## 2021-01-01 RX ADMIN — ACETAMINOPHEN 1000 MG: 500 TABLET ORAL at 08:35

## 2021-01-01 RX ADMIN — GABAPENTIN 600 MG: 600 TABLET, FILM COATED ORAL at 08:35

## 2021-01-01 RX ADMIN — DOFETILIDE 500 MCG: 0.12 CAPSULE ORAL at 21:49

## 2021-01-01 RX ADMIN — ARFORMOTEROL TARTRATE 15 MCG: 15 SOLUTION RESPIRATORY (INHALATION) at 21:38

## 2021-01-01 RX ADMIN — HYDROMORPHONE HYDROCHLORIDE 0.5 MG: 1 INJECTION, SOLUTION INTRAMUSCULAR; INTRAVENOUS; SUBCUTANEOUS at 11:08

## 2021-01-01 RX ADMIN — SODIUM CHLORIDE, PRESERVATIVE FREE 10 ML: 5 INJECTION INTRAVENOUS at 21:50

## 2021-01-01 RX ADMIN — SODIUM CHLORIDE, POTASSIUM CHLORIDE, SODIUM LACTATE AND CALCIUM CHLORIDE 9 ML/HR: 600; 310; 30; 20 INJECTION, SOLUTION INTRAVENOUS at 08:38

## 2021-01-01 RX ADMIN — CEFAZOLIN SODIUM 2 G: 2 INJECTION, SOLUTION INTRAVENOUS at 00:33

## 2021-01-01 RX ADMIN — KETOROLAC TROMETHAMINE 15 MG: 30 INJECTION, SOLUTION INTRAMUSCULAR; INTRAVENOUS at 02:20

## 2021-01-01 RX ADMIN — ROCURONIUM BROMIDE 50 MG: 10 INJECTION INTRAVENOUS at 09:25

## 2021-01-01 RX ADMIN — SODIUM CHLORIDE, POTASSIUM CHLORIDE, SODIUM LACTATE AND CALCIUM CHLORIDE 9 ML/HR: 600; 310; 30; 20 INJECTION, SOLUTION INTRAVENOUS at 14:04

## 2021-01-01 RX ADMIN — DOFETILIDE 500 MCG: 0.12 CAPSULE ORAL at 08:33

## 2021-01-01 RX ADMIN — CEFAZOLIN SODIUM 1 G: 1 INJECTION, SOLUTION INTRAVENOUS at 17:13

## 2021-01-01 RX ADMIN — CEFAZOLIN SODIUM 2 G: 2 INJECTION, SOLUTION INTRAVENOUS at 09:29

## 2021-01-01 RX ADMIN — HYDROCODONE BITARTRATE AND ACETAMINOPHEN 2 TABLET: 7.5; 325 TABLET ORAL at 15:52

## 2021-01-01 RX ADMIN — CEFAZOLIN SODIUM 2 G: 2 INJECTION, SOLUTION INTRAVENOUS at 16:53

## 2021-01-01 RX ADMIN — METOPROLOL TARTRATE 25 MG: 25 TABLET, FILM COATED ORAL at 21:49

## 2021-01-01 RX ADMIN — MIDAZOLAM HYDROCHLORIDE 2 MG: 1 INJECTION, SOLUTION INTRAMUSCULAR; INTRAVENOUS at 08:36

## 2021-01-01 RX ADMIN — LIDOCAINE HYDROCHLORIDE 100 MG: 20 INJECTION, SOLUTION INFILTRATION; PERINEURAL at 09:25

## 2021-01-01 RX ADMIN — ACETAMINOPHEN 975 MG: 325 TABLET ORAL at 23:08

## 2021-01-01 RX ADMIN — ROSUVASTATIN 20 MG: 20 TABLET, FILM COATED ORAL at 21:49

## 2021-01-01 RX ADMIN — Medication 100 MCG: at 09:49

## 2021-01-01 RX ADMIN — SODIUM CHLORIDE, POTASSIUM CHLORIDE, SODIUM LACTATE AND CALCIUM CHLORIDE 100 ML/HR: 600; 310; 30; 20 INJECTION, SOLUTION INTRAVENOUS at 00:36

## 2021-01-01 RX ADMIN — PROPOFOL 100 MG: 10 INJECTION, EMULSION INTRAVENOUS at 09:25

## 2021-01-01 RX ADMIN — SENNOSIDES AND DOCUSATE SODIUM 2 TABLET: 50; 8.6 TABLET ORAL at 08:33

## 2021-01-01 RX ADMIN — KETOROLAC TROMETHAMINE 15 MG: 30 INJECTION, SOLUTION INTRAMUSCULAR; INTRAVENOUS at 21:49

## 2021-01-01 RX ADMIN — SODIUM CHLORIDE, PRESERVATIVE FREE 10 ML: 5 INJECTION INTRAVENOUS at 13:05

## 2021-01-01 RX ADMIN — SENNOSIDES AND DOCUSATE SODIUM 2 TABLET: 50; 8.6 TABLET ORAL at 13:05

## 2021-01-01 RX ADMIN — ENOXAPARIN SODIUM 40 MG: 40 INJECTION SUBCUTANEOUS at 08:29

## 2021-01-01 RX ADMIN — FAMOTIDINE 40 MG: 20 TABLET, FILM COATED ORAL at 13:03

## 2021-01-01 RX ADMIN — TRANEXAMIC ACID 1000 MG: 100 INJECTION, SOLUTION INTRAVENOUS at 08:37

## 2021-01-01 ASSESSMENT — HOOS JR
HOOS JR SCORE: 15
HOOS JR SCORE: 43.335

## 2021-01-20 ENCOUNTER — OFFICE VISIT CONVERTED (OUTPATIENT)
Dept: PULMONOLOGY | Facility: CLINIC | Age: 66
End: 2021-01-20
Attending: INTERNAL MEDICINE

## 2021-03-09 NOTE — PROGRESS NOTES
Deb Harrison  1955  [phone]      03/09/2021      [facility]     Siri Bain, APRN  [address] [place] [address]    Chief Complaint   Patient presents with   • Atrial tachycardia (CMS/HCC)       Problem List/PMHx:   1.  Persistent Atrial Flutter/ Paroxysmal Atrial Tachycardia              A.  EP study, November 2003, at OSH with no RFA performed.              B. Recurrent, persistent atrial flutter with rapid ventricular rate and 1-to-1 AV conduction at 240  BPM.              C. EP study, 04/22/2004, with radiofrequency ablation of atrial flutter around the ASD patch,  radiofrequency ablation of isthmus dependent counter clockwise atrial flutter, radiofrequency ablation  of right atrial incisional scar flutter and a fourth atrial flutter which  left atrial in origin and was not mapped  nor ablated.              D.  Recurrent atrial flutter, June 2004. Repeat EP study with successful radiofrequency ablation  of a right  atrial scar related flutter along the anterolateral wall.               E.  Echocardiogram, 10/03/2007, with EF 55% to 60%, severe TR, mitral valve area of 1.8 cm²  consistent with mild mitral stenosis, LA 4.5.             F. Implantation of a dual chamber permanent pacemaker, 02/25/2008 (Medtronic device).             G. EP study 7/31/2008 with radiofrequency ablation of atrial tachycardia from the high anterolateral  atrial wall with  left atrial flutter, not mapped or ablated.              H. Rythmol increased, 07/31/2008             I. EPS study with RFA for atrial tachycardia, 01/16/2015.              DARRYL Nielsen initiated 01/16/2015             K. RFA AV Node 4/10/16 and RFA of AT and RA scar flutter  2.  Pulmonary Hypertension/TR             A. DELIA, 04/20/2004 with moderate tricuspid regurgitation, mild holosystolic mitral valve prolapse,  marked right atrial enlargement at approximately 7-8 cm, normal LV size and function with LVEF   (72%),  right enlarged ventricular size and diminished systolic function.   B.  Echocardiogram, 02/01/2006: EF (70%), moderately dilated RV, mild TR, mild pulmonary hypertension.   C.  Echocardiogram in October 2007: Mild MS, moderate RV enlargement, moderate to severe tricuspid regurgitation, normal LV size and function, RVSP of 45 mmHg.            D. Right heart cath, 08/01/2008 with pulmonary artery pressure of 48/22, RV 53/20, cardiac output 5,  cardiac index 2/8, wedge 26.   E. Echocardiogram, April 2009: LVEF (50% to 55%), severe right ventricular enlargement,  moderate to severe tricuspid insufficiency, mild to moderate MR, RVSP estimated to be 40-50 mmHg.   F.  Echocardiogram, 02/23/2012, with ejection fraction of 50% to 55%, LAE at 4 cm, mild MS with mild restriction of the mitral valve leaflet, prolapse of the posterior leaflet, mean gradient of 3.5 mmHg, mild TR with an RVSP of 31.  G  Echocardiogram, 04/23/2014, revealing EF 45% to 50% with diastolic dysfunction, mild MR, Mild TR. RVSP 42.   H. Echo 2/29/16: LVEF NL, mild TR, mild -mod MR, RV severely dilated   I.  Echocardiogram 3/1/18: EF 66-70%, moderate TR             J.  DELIA 3/22/18: NL LVEF, Moderate MR, Mod TR, severe RV  enlargement             K.  R/l HEART CATH 8/18 normal coronary arteries, normal  LVEF with mild elevated LV  pressure, normal pulmonary artery  pressure, moderately elevated right atrial pressure, no  evidence of MS or MR; felt previous ASD responsible for  progressive RV failure.  3.  ASD (Atrial Septal Defect), s/p repair age 6.    4.  AVB              A.  S/P MDT dual chamber PPM 2/25/2008              B.  PM generator change out 3/2/17      Allergies  Allergies   Allergen Reactions   • Adenosine Nausea Only   • Codeine Nausea Only and Dizziness       Current Medications    Current Outpatient Medications:   •  budesonide (PULMICORT) 0.5 MG/2ML nebulizer solution, Take 0.5 mg by nebulization Daily., Disp: , Rfl:   •   Cholecalciferol (VITAMIN D3) 50 MCG (2000 UT) capsule, Take 2,000 Units by mouth Daily., Disp: , Rfl:   •  dofetilide (TIKOSYN) 500 MCG capsule, TAKE 1 CAPSULE TWICE A DAY, Disp: 180 capsule, Rfl: 4  •  Eliquis 5 MG tablet tablet, TAKE 1 TABLET TWICE A DAY, Disp: 180 tablet, Rfl: 3  •  furosemide (LASIX) 40 MG tablet, TAKE 1 TABLET DAILY, Disp: 90 tablet, Rfl: 3  •  levothyroxine (SYNTHROID, LEVOTHROID) 75 MCG tablet, Take 75 mcg by mouth Daily., Disp: , Rfl: 5  •  linaclotide (LINZESS) 145 MCG capsule capsule, Take 145 mcg by mouth Every Morning Before Breakfast., Disp: , Rfl:   •  metoprolol tartrate (LOPRESSOR) 25 MG tablet, Take 1 tablet by mouth Daily., Disp: 90 tablet, Rfl: 4  •  rosuvastatin (CRESTOR) 20 MG tablet, Take 20 mg by mouth Every Night., Disp: , Rfl:   •  spironolactone (ALDACTONE) 50 MG tablet, TAKE 1 TABLET BY MOUTH EVERY DAY, Disp: 90 tablet, Rfl: 3  •  temazepam (RESTORIL) 30 MG capsule, Take 30 mg by mouth at night as needed for sleep., Disp: , Rfl:   •  umeclidinium-vilanterol (ANORO ELLIPTA) 62.5-25 MCG/INH aerosol powder  inhaler, Inhale 1 puff Daily., Disp: , Rfl:     History of Present Illness     Pt presents for follow up of AF/AFL/AVB/VHD/Pulm HTN. Since the pt has seen us in clinic last, pt denies any syncope, CP, LH, and dizziness. Denies any hospitalizations, ER visits, bleeding, or TIA/CVA symptoms. Overall feels well.  No significant tachypalpitations.  No significant atrial fibrillation.  Was diagnosed with Covid in November and has had a difficult 3-month course.  Recent received her Covid vaccination.  Pressures been well controlled at home.    ROS:  General:  + fatigue, No weight gain or loss  Cardiovascular:  Denies CP, PND, syncope, near syncope, edema or palpitations.  Pulmonary:  + Mild and chronic BUTT, No cough, or wheezing    Vitals:    03/09/21 1128   BP: 110/72   BP Location: Left arm   Patient Position: Sitting   Pulse: 72   Weight: 79.8 kg (176 lb)   Height: 162.6 cm  "(64\")       PE:  General: NAD  Neck: no JVD, no carotid bruits, no TM  Heart: RRR, NL S1, S2, S4 present, no rubs, + TR murmur  Lungs: CTA, no wheezes, rhonchi, or rales  Abd: soft, non-tender, NL BS  Ext: No musculoskeletal deformities, no edema, cyanosis, or clubbing  Psych: normal mood and affect    Diagnostic Data:    ECG 12 Lead    Date/Time: 3/9/2021 11:50 AM  Performed by: Manuel Tavarez MD  Authorized by: Manuel Tavarez MD   Comparison: compared with previous ECG from 8/11/2020  Similar to previous ECG  Rhythm: sinus rhythm and paced  BPM: 70                1. Atrial tachycardia (CMS/HCC)    2. Atypical atrial flutter (CMS/HCC)    3. AV block    4. Pulmonary hypertension (CMS/HCC)    5. Valvular heart disease        PM Interrogation: NL PM fxn, Nl battery fxn, 1.8% AF, 54% right atrial paced, 99% RV paced.  6.5 years left on battery    Plan:  1) Atrial tach/atrial fibrillation; well-controlled on Tikosyn.  QTc stable.  Continue present medications.   2) AV block: Normal pacemaker function.    3) Anticoagulation  Continue NOAC  4) pulmonary HTN; follows up with her pulmonologist.  5) valvular heart disease: Needs repeat echocardiogram next visit    F/up in 6 months      "

## 2021-05-10 NOTE — PROCEDURES
"   Procedure Note      Patient Name: Deb Harrison   Patient ID: [id number]   Sex: Female   YOB: 1955    Primary Care Provider: Siri Bain [facility]    Visit Date: June 19, 2020    Provider: Phuc Lucero MD   Location: [facility]   Location Address: [address] [place] [address], [address]   MICHAEL Costello  [id number]   Location Phone: [phone]          FINAL REPORT   TRANSTHORACIC ECHOCARDIOGRAM REPORT    Diagnosis: Dyspnea   Height: 5'4\" Weight: 183 B/P: 124/64 BSA: 1.9   Tech: JTW   MEASUREMENTS:  RVID (Diastole) : RVID. (NORMAL: 0.7 to 2.4 cm max)   LVID (Systole): 2.6 cm (Diastole): 3.5 cm . (NORMAL: 3.7 - 5.4 cm)   Posterior Wall Thickness (Diastole): 1.0 cm. (NORMAL: 0.8 - 1.1 cm)   Septal Thickness (Diastole): 1.0 cm. (NORMAL: 0.7 - 1.2 cm)   LAID (Systole): 4.1 cm. (NORMAL: 1.9 - 3.8 cm)   Aortic Root Diameter (Diastole): 3.1 cm. (NORMAL: 2.0 - 3.7 cm)   COMMENTS:  The patient underwent 2-D, M-Mode, and Doppler examination, including pulse-wave, continuous-wave, and color-flow Doppler analysis; the study is technically adequate. The following findings were noted:   FINDINGS:  MITRAL VALVE: Moderately thickened mitral valve leaflets with moderate mitral annular calcification. Mild prolapse of the anterior mitral leaflet. Mild mitral stenosis. Mild mitral regurgitation.   AORTIC VALVE: Normal trileaflet aortic valve, opens well. No evidence of aortic stenosis or regurgitation on Doppler examination.   TRICUSPID VALVE: The tricuspid leaflets do not appear thickened. Moderate tricuspid regurgitation. Estimated right ventricular systolic pressure was 50 to 55 mmHg.   PULMONIC VALVE: Grossly normal. No evidence of pulmonic stenosis. Mild to moderate pulmonic regurgitation.   AORTIC ROOT: Normal in size with adequate motion.   LEFT ATRIUM: Moderately dilated. No intracavitary masses visualized.   LEFT VENTRICLE: Normal left ventricular size. Normal left ventricular wall " thickness. Low normal left ventricular systolic function with an estimated ejection fraction of 50%. There is a flat, D-shaped septum in systole and diastole consistent with right ventricular pressure overload. Elevated left ventricular filling pressure.   RIGHT VENTRICLE: Severely dilated. Mildly reduced right ventricular systolic function. A pacemaker wire is observed in the right ventricle.   RIGHT ATRIUM: Severely dilated right atrium.   PERICARDIUM: No evidence of pericardial effusion.   INFERIOR VENA CAVA: The IVC is dilated with absent respirophasic changes, consistent with significantly elevated central venous pressures.   DOPPLER: DT= 237 msec. IVRT is 95 msec. E/E' is 11.   Faxed: 06/23/2020      CONCLUSION:  1.  Low normal left ventricular systolic function.  A flat, D-shaped septum in systole and diastole consistent with        right ventricular pressure overload.  2.  Severely dilated right ventricle with mildly reduced right ventricular systolic function.    3.  Severely dilated right atrium.   4.  Moderately dilated left atrium.   5.  Moderately thickened mitral valve leaflets with moderate mitral annular calcification and mild mitral stenosis        and mitral regurgitation.   6.  Mild to moderate pulmonic regurgitation.      MD TOÑITO Simon/agapito      This note was transcribed by Janie Best.  [email or web address]  The above service was transcribed by Janie Best, and I attest to the accuracy of the note.  TOÑITO                 Electronically Signed by: Yana Best-, Other -Author on June 23, 2020 11:42:20 AM  Electronically Co-signed by: Phuc Lucero MD -Reviewer on June 23, 2020 12:45:43 PM

## 2021-05-14 NOTE — PROGRESS NOTES
Progress Note      Patient Name: Deb Harrison   Patient ID: [id number]   Sex: Female   YOB: 1955    Primary Care Provider: Siri CARLOS    Visit Date: April 9, 2021    Provider: Phuc Lucero MD   Location: [facility]   Location Address: [address] [place] [address], [facility]   [place]  [id number]   Location Phone: [phone]          Chief Complaint     Hospital followup.       History Of Present Illness  REFERRING CARE PROVIDER: Siri CARLOS   Deb Harrison is a 65 year old /White female who presents for hospital followup today following her recent admission at [facility] for recurrent episodes of dizziness. She had an extensive neurologic workup during that admission, which was all essential unremarkable. She also had a repeat echocardiogram at that time, which again showed low normal left ventricular systolic function with an estimated ejection fraction of 50% and a moderately dilated right ventricle with borderline reduced right ventricular systolic function. She has a complex cardiac history including surgical repair of a congenital atrial septal defect with past closure at age 6, as well as chronic right ventricular failure secondary to previous volume overload from the ASD. In addition, she follows with Dr. Tavarez regularly for a history of atypical atrial flutter, status post multiple radiofrequency ablations as well as paroxysmal atrial fibrillation. She has a permanent pacemaker and it is interrogated regularly by Dr. Tavarez's office. Her recent echocardiogram also revealed evidence of diastolic dysfunction with an E/e' ratio of 16. The patient has a history of mild pulmonary hypertension and her echocardiogram in 2020 showed an estimated PA systolic pressure of 50-55 mmHg. The Doppler signal envelope was incomplete on her recent echocardiogram, so PA pressures could not be accurately estimated. She continues to have stable  "moderate exertional dyspnea, which has been unchanged for at least a year. Her exercise tolerance remains at baseline. She does not report any bleeding problems on chronic anticoagulation with Eliquis. She remains on antiarrhythmic therapy with Tikosyn and her QTc levels were normal on EKGs obtained during her recent hospital admission. She does not report any episodes of chest pain/pressure or any palpitations, PND, peripheral edema, or fever/chills. She states her episodes of intermittent dizziness have improved significantly. During her admission, she actually described more of a vertigo-like sensation and she has not suffered any tommy syncopal episodes. She is scheduled to see ENT on an outpatient basis in the next few weeks for further assessment.   PAST MEDICAL HISTORY: Positive for atrial flutter/fibrillation; right ventricular failure; obstructive sleep apnea; hyperlipidemia; hypothyroidism; moderate mitral regurgitation. Surgical history is significant for a patch closure of an atrial septal defect at age 6 and permanent pacemaker placement for AV block with pulse generator change in March 2017 (followed by Dr. Tavarez).   PSYCHOSOCIAL HISTORY: Denies alcohol use. Previous use of tobacco, but quit.   CURRENT MEDICATIONS: Medication list was reviewed and is as documented. Please see the list in the chart.      ALLERGIES: Codeine.       Review of Systems  · HENT  o Admits  o : Head congestion  · Cardiovascular  o Admits  o : palpitations (fast, fluttering, or skipping beats), shortness of breath while walking or lying flat  o Denies  o : swelling (feet, ankles, hands), chest pain or angina pectoris   · Respiratory  o Admits  o : chronic or frequent cough      Vitals  Date Time BP Position Site L\R Cuff Size HR RR TEMP (F) WT  HT  BMI kg/m2 BSA m2 O2 Sat FR L/min FiO2 HC       04/09/2021 12:15 /60 Sitting    82 - R   175lbs 16oz 5'  4\" 30.21 1.9             Physical " Examination  · Constitutional  o Appearance  o : Well developed, well nourished, alert and oriented, in no acute distress.  · Neck  o Inspection/Palpation  o : Supple, no JVD, no carotid bruit, no masses.   · Respiratory  o Auscultation of Lungs  o : Clear to auscultation bilaterally. Mildly prolonged expiratory phase. No wheezing or rales. No tachypnea.   · Cardiovascular  o Heart  o : Regular rate and rhythm, S1 and S2 present, no S3 gallop. There is a 2/6 diastolic murmur at the apex with a mild systolic murmur at the right lower sternal border. No friction rub. PMI is mildly displaced.   · Gastrointestinal  o Abdominal Examination  o : Soft, obese, nondistended, nontender, normoactive bowel sounds in all quadrants, no masses.   · Skin and Subcutaneous Tissue  o Skin  o : Warm and dry, no rashes or lesions, no jaundice, normal skin turgor.   · Extremities  o Extremities  o : No cyanosis, clubbing, or edema, 2+ radial pulses bilaterally.           Assessment     1.  Intermittent dizziness/vertigo, improved since her recent hospital admission.   2.  Right ventricular enlargement with slightly reduced right ventricular systolic function; the patient has a        history of congenital ASD repair with patch closure at age 6.   3.  Interstitial lung disease.  4.  Status post permanent pacemaker placement.  Her device is followed by Dr. Tavarez.   5.  History of atypical atrial flutter/atrial tachycardia, status post multiple radiofrequency ablations in the past.         She also has a history of paroxysmal atrial fibrillation and remains on antiarrhythmic therapy with Tikosyn        as well as chronic anticoagulation with Eliquis.          Plan     1.  Her recent echocardiogram showed stable findings and she does not report any symptoms suggestive of        myocardial ischemia.  No new arrhythmias were observed on her telemetry monitor during her        hospitalization and her pacemaker appeared to be functioning  normally.  She states she saw Dr. Tavarez        several weeks ago and he interrogated her device at that time.  I do not believe her dizziness/vertigo is due        to a cardiac etiology and I would not recommend a tilt table study or further testing at this time.  She does        have outpatient followup arranged with ENT in the next few weeks and we will see what testing they order.   2.  Continue her chronic cardiac medications for now.    3.  If she does not develop any significant new problems, I will just plan to see her back in the office in 6-8        months.     Phuc Lucero MD  BP/rt                Electronically Signed by: Delphine Schwarz-, Other -Author on April 17, 2021 11:15:07 AM  Electronically Co-signed by: Phuc Lucero MD -Reviewer on May 5, 2021 10:26:21 AM

## 2021-05-28 NOTE — PROGRESS NOTES
Patient: MARY LAMA     Acct: [id number]     Report: #[id number]  UNIT #: [id number]     : 1955    Encounter Date:2021  PRIMARY CARE: SVETA MADSEN  ***Signed***  --------------------------------------------------------------------------------------------------------------------  Chief Complaint      Encounter Date      2021            Primary Care Provider      SVETA MADSEN            Referring Provider      SVETA MADSEN            Patient Complaint      Patient is complaining of      PT here today for F/U, discuss CT results, COPD            VITALS      Height 64 in / 162.56 cm      Weight 177 lbs  / 80.642263 kg      BSA 1.86 m2      BMI 30.4 kg/m2      Temperature 98.0 F / 36.67 C - Temporal      Pulse 74      Respirations 15      Blood Pressure 100/60 Sitting, Right Arm      Pulse Oximetry 96%, room air            HPI      The patient is a 65 year old female patient of Dr. Colmenares's with history of     aspergillus pneumonia that is now resolved treated with Cresemba,  history of     chronic obstructive pulmonary disease and history of nocturnal hypoxia on     nighttime oxygen 2 liters per minute via nasal cannula. The patient presents for    follow up today. The patient had a CT scan of the chest on 21 that showed     bibasilar atelectasis right greater than left, no change in 3 mm left upper lobe    pulmonary nodule and the previously demonstrated 3 mm nodule in the right lower     lobe was not visualized on this study and may be obscured by atelectasis. There     was no new or enlarging pulmonary nodule identified. It also showed some changed    of pulmonary arterial hypertension. The patient states she is under the care of     Dr. Lucero cardiologist and had an echocardiogram completed. The patient denies     any fever or chills, night sweats, hemoptysis,  purulent sputum production,     swollen glands in head and neck, unintentional weight loss, chest pain or chest      tightness, abdominal pain, nausea or vomiting or diarrhea. The patient denies      any headaches, myalgias, sore throat, changes in sense of taste and smell any     coronavirus or flu like symptoms. The patient denies any leg swelling, orthopnea    or paroxysmal nocturnal dyspnea.  The patient states at times she has difficulty    coughing up secretions and it is thick and gummy at times. The patient states     she is taking Brovana and Pulmicort and spiriva everyday as prescribed however     brovana is now too expensive through [email or web address] and she would like us to send her     medications through [facility]. The patient states she is a former     cigarettes smoker and quit smoking back in 2008. The patient states she is able     to perform her activities of daily living.             I reviewed the Review of Systems, medical, surgical and family history and agree    with those as entered.      Copies To:   WAGNER QUESADA      Constitutional:  Denies: Fatigue, Fever, Weight gain, Weight loss, Chills,     Insomnia, Other      Respiratory/Breathing:  Complains of: Shortness of air, Cough; Denies: Wheezing,    Hemoptysis, Pleuritic pain, Other      Endocrine:  Denies: Polydipsia, Polyuria, Heat/cold intolerance, Abnorml     menstrual pattern, Diabetes, Other      Eyes:  Denies: Blurred vision, Vision Changes, Other      Ears, nose, mouth, throat:  Denies: Congestion, Dysphagia, Hearing Changes, Nose    Bleeding, Nasal Discharge, Throat pain, Tinnitus, Other      Cardiovascular:  Denies: Chest Pain, Exertional dyspnea, Peripheral Edema,     Palpitations, Syncope, Wake up Gasping for air, Orthopnea, Tachycardia, Other      Gastrointestinal:  Denies: Abdominal pain/cramping, Bloody stools, Constipation,    Diarrhea, Melena, Nausea, Vomiting, Other      Genitourinary:  Denies: Dysuria, Urinary frequency, Incontinence, Hematuria,     Urgency, Other      Musculoskeletal:  Denies: Joint Pain, Joint Stiffness,  Joint Swelling, Myalgias,    Other      Hematologic/lymphatic:  DENIES: Lymphadenopathy, Bruising, Bleeding tendencies,     Other      Neurologic:  Denies: Headache, Numbness, Weakness, Seizures, Other      Psychiatric:  Denies: Anxiety, Appropriate Effect, Depression, Other      Sleep:  No: Excessive daytime sleep, Morning Headache?, Snoring, Insomnia?, Stop    breathing at sleep?, Other      Integumentary:  Denies: Rash, Dry skin, Skin Warm to Touch, Other            FAMILY/SOCIAL/MEDICAL HX      Surgical History:  Yes: Abdominal Surgery (HERNIA), Bowel Surgery (COLONOSCOPY),    Eye Surgery (cataract), Head Surgery (BILATERAL CATARACTS-), Hernia Surgery,     Oral Surgery (EGD), Orthopedic Surgery (RTH), Other Surgeries (open heart 5 to 6    heart ablations has pace maker ); No: AAA Repair, Adenoids, Angioplasty, Appen    dectomy, Back Surgery, Bladder Surgery, Breast Surgery, CABG, Carotid Stenosis,     Cholecystectomy, Ear Surgery, Kidney Surgery, Nose Surgery, Prostatectomy,     Rectal Surgery, Spinal Surgery, Testicular Surgery, Throat Surgery, Tonsils,     Valve Replacement, Vascular Surgery      Diabetes - Family Hx:  Brother, Sister      Cancer/Type - Family Hx:  Mother, Brother      Social History:  No Tobacco Use, No Alcohol Use, No Recreational Drug use      Smoking status:  Former smoker ((quit 2008 1ppd for 25 to 30- years ))       Section:  No      Tubal Ligation:  Yes (partial)      Hysterectomy:  Yes (Partial)      Anticoagulation Therapy:  Yes      Antibiotic Prophylaxis:  No      Medical History:  Yes: Arthritis, Asthma (INHALER), Chronic Bronchitis/COPD,     Congestive Heart Failu, Shortness Of Breath (COUGH), Miscellaneous Medical/oth     (pace-maker/ h/o VSD as a child s/p repair); No: Anemia, Blood Disease, Broken     Bones, Cataracts, Chemical Dependency, Chemotherapy/Cancer, Emphysema, Chronic     Liver Disease, Colon Trouble, Colitis, Diverticulitis, Deafness or Ringing Ears,     Depression, Anxiety, Bipolar Disorder, PTSD, Diabetes, Epilepsy, Seizures,     Glaucoma, Gall Stones, Gout, Head Injury, Heart Attack, Heart Murmur, GERD,     Hemorrhoids/Rectal Prob, Hepatitis, Hiatal Hernia, High Blood Pressure, HIV (Do     not ask - volu, Kidney or Bladder Disease, Kidney Stones, Migrane Headaches,     Mitral Valve Prolapse, Psychiatric Care, Reflux Disease, Rheumatic Fever,     Sexually Transmitted Dis, Sinus Trouble, Skin Disease/Psoriais/Ecz, Stroke,     Thyroid Problem, Tuberculosis or Pos TB Te      Psychiatric History      none            PREVENTION      Hx Influenza Vaccination:  Yes      Date Influenza Vaccine Given:  Sep 1, 2020      Influenza Vaccine Declined:  No      2 or More Falls in Past Year?:  No      Fall Past Year with Injury?:  No      Hx Pneumococcal Vaccination:  Yes      Encouraged to follow-up with:  PCP regarding preventative exams.      Chart initiated by      Tova Lopes CMA            ALLERGIES/MEDICATIONS      Allergies:        Coded Allergies:             ADENOSINE (Verified  Allergy, Severe, NAUSEA, 2/23/21)           CODEINE (Verified  Allergy, Severe, NAUSEA, 2/23/21)      Medications    Last Reconciled on 2/23/21 14:48 by Juan Alberto PRATT-NaCl 3% (Sodium Chloride 3% Neb) 4 Ml Vial.neb      4 ML INH RTBID for 90 Days, #180 NEB 3 Refills         Prov: TRISHA CALDERÓN [facility]         2/23/21       Albuterol/Ipratropium (Duoneb) 3 Ml Ampul.neb      3 ML INH Q4H PRN for SHORTNESS OF BREATH for 90 Days, #540 NEB 3 Refills         Prov: TRISHA CALDERÓN [facility]         2/23/21       Neb-Budesonide (Pulmicort) 0.5 Mg/2 Ml Ampul.neb      0.5 MG INH BID for 90 Days, #180 NEB 3 Refills         Prov: TRISHA CALDERÓN [facility]         2/23/21       Tiotropium Bromide (Spiriva Respimat 2.5 mcg/Puff) 4 Gm Mist.inhal      2 PUFFS INH RTQDAY for 90 Days, #3 MDI 3 Refills         Prov: TRISHA CALDERÓN [facility]         2/23/21       Arformoterol Tartrate (Brovana) 15 Mcg/2 Ml  Vial.neb      15 MCG INH RTBID for 90 Days, #180 NEB 3 Refills         Prov: TRISHA CALDERÓN [facility]         2/23/21       Baclofen (BACLOFEN) 10 Mg Tablet      10 MG PO TID, #90 TAB 0 Refills         Reported         2/23/21       predniSONE Dosepak (predniSONE Dosepak) 5 Mg Tab.ds.pk      5 MG PO ASDIR, #1 PACKET         Reported         2/23/21       Promethazine DM 6.25-15MG/5 ML (Promethazine DM 6.25-15MG/5 ML) 473 Ml Syrup      5 ML PO Q6H PRN for COUGH, #120 ML 0 Refills         Reported         2/20/20       Linaclotide (Linzess) 145 Mcg Capsule      145 MCG PO QDAY, CAP         Reported         2/20/20       Furosemide (Lasix) 40 Mg Tablet      40 MG PO QDAY, #30 TAB 0 Refills         Reported         10/30/18       Temazepam (Temazepam) 30 Mg Capsule      30 MG PO HS, CAP         Reported         1/24/18       Levothyroxine (Levothyroxine) 0.075 Mg Tablet      0.075 MG PO QDAY@07, #30 TAB 0 Refills         Reported         1/24/18       Spironolactone (Aldactone) 25 Mg Tablet      25 MG PO QDAY, #60 TAB 0 Refills         Reported         1/24/18       Rosuvastatin Calcium (Crestor*) 20 Mg Tablet      20 MG PO HS, #30 TAB 0 Refills         Reported         1/24/18       Metoprolol Tartrate (Metoprolol Tartrate) 25 Mg Tablet      25 MG PO HS, #90 TAB 0 Refills         Reported         1/24/18       Dofetilide (Tikosyn) 0.5 Mg Cap      0.5 MG PO BID, CAP         Reported         1/24/18       Apixaban (Eliquis) 5 Mg Tablet      5 MG PO BID for 30 Days, #60 TAB         Reported         1/24/18      Current Medications      Current Medications Reviewed 2/23/21            EXAM      Vital Signs Reviewed      Gen: WDWN, Alert, NAD.        HEENT:  PERRL, EOMI.  OP, nares clear, no sinus tenderness.      Neck:  Supple, no JVD, no thyromegaly.      Lymph: No axillary, cervical, supraclavicular lymphadenopathy noted bilaterally.      Chest:  Good aeration. Mildly decreased breath sounds throughout, no wheezes,      rhonchi or crackles, normal work of breathing noted.  The patient is able to     speak full sentences without difficulty.      CV:  RRR, no MGR, pulses 2+, equal.      Abd:  Soft, NT, ND, + BS, no HSM.      EXT:  No clubbing, no cyanosis, no edema, no joint tenderness.       Neuro:  A  Skin: No rashes or lesions.      Vitals      Vitals:             Height 64 in / 162.56 cm           Weight 177 lbs  / 80.627262 kg           BSA 1.86 m2           BMI 30.4 kg/m2           Temperature 98.0 F / 36.67 C - Temporal           Pulse 74           Respirations 15           Blood Pressure 100/60 Sitting, Right Arm           Pulse Oximetry 96%, room air            REVIEW      Results Reviewed      PCCS Results Reviewed?:  Yes Prev Lab Results, Yes Prev Radiology Results, Yes     Previous Mecial Records      Radiographic Results               [facility]                PACS RADIOLOGY REPORT            Patient: MARY LAMA   Acct: #[id number]   Report: #[id number]            UNIT #: [id number]    DOS: 21 1014      INSURANCE:[employer]   ORDER #:[id number]      LOCATION:[facility]     : 1955            PROVIDERS      ADMITTING:     ATTENDING: TRISHA CALDERÓN      FAMILY:  NONE,MD   ORDERING:  TRISHA CALDERÓN [facility]         OTHER:    DICTATING:  Forrest Colon MD            REQ #:[id number]   EXAM:[facility] - LOW DOSE CHEST CT SCREENING      REASON FOR EXAM:  FORMER SMOKER      REASON FOR VISIT:  FORMER SMOKER            *******Signed******         PROCEDURE:   CT LOW DOSE CHEST SCREENING             COMPARISON:   [facility], CT, CT LOW DOSE CHEST SCREENING,     2020, 12:45.             REASON FOR SCREENING:   Patient is 65 years of age, asymptomatic, and has a     smoking history of more       than 30 pack years.      SMOKING STATUS:   Former smoker.  Years since quittin                SCREENING  VISIT:   <>                  TECHNIQUE:   Axial unenhanced LDCT images from the apices through mid-kidney     were obtained.       Evaluation of solid organs and vascular structures is suboptimal due to the lack    of IV contrast.       Imaging was performed on a Siemens Emotion 16 CT scanner.             RADIATION:   CT Dose Index Vol (CTDIvol):    3.11  mGy         Dose Length Product (DLP):    116  mGy-cm             DIAGNOSTIC QUALITY:   Satisfactory             FINDINGS:  There is no mediastinal, hilar, or axillary adenopathy.  There is a     left subclavian       transvenous pacemaker which remains in place unchanged in position.  There is     marked cardiomegaly       similar prior exam.  There is marked enlargement of the proximal pulmonary     arteries including the       main pulmonary artery consistent with changes of pulmonary arterial     hypertension.  No pleural       effusion identified.  There is a stable irregular shaped 3 mm nodule within the     left upper lobe       axial image 40 calcified granuloma seen anteriorly within the left upper lobe.      There is bilateral       lower lobe atelectasis, right greater than left.  Previously demonstrated right     lower lobe       pulmonary nodule is not visualized and may be obscured by atelectasis on today's    study.  No new or       enlarging noncalcified pulmonary nodule seen within either lung.  Again seen is     a left adrenal       nodule measuring 2.4 cm in size containing fat and calcium consistent with a     myelolipoma.  Right       adrenal gland is within normal limits.  Other visualized portions of the upper     abdomen are       unremarkable.             There are degenerative changes of the spine.  No definite lytic or sclerotic     bony lesion       identified.                CONCLUSION:         1. No change in cardiomegaly and changes of pulmonary arterial hypertension.      2. Increasing bibasilar atelectasis, right greater than left       3. No change in 3 mm left upper lobe pulmonary nodule.  Previously demonstrated     3 mm nodule in the       right lower lobe was not visualized on today's study and may be obscured by     atelectasis.  There is       no new or enlarging pulmonary nodule identified.      4. Stable left adrenal myelolipoma             LUNG-RADS CLASSIFICATION:   2-benign appearance or behavior              Recommend:  Repeat low-dose screening chest CT in 12 months              AUGUSTA FAIRBANKS MD             Electronically Signed and Approved By: AUGUSTA FAIRBANKS MD on 2/03/2021 at 11:09                                  Until signed, this is an unconfirmed preliminary report that may contain      errors and is subject to change.                                              STEBA:      D:02/03/21 1109            Assessment      COPD (chronic obstructive pulmonary disease) - J44.9            Abnormal chest CT - R93.89            Lung nodule - R91.1            Notes      New Medications      * predniSONE Dosepak 5 MG TAB.DS.PK: 5 MG PO ASDIR #1         Instructions: Take dosepack as directed on package. Take all of first day's        tablets the first day.      * BACLOFEN 10 MG TABLET: 10 MG PO TID #90      * Albuterol/Ipratropium (Duoneb) 3 ML AMPUL.NEB: 3 ML INH Q4H PRN SHORTNESS OF       BREATH 90 Days #540         Instructions: J44.9         Dx: COPD (chronic obstructive pulmonary disease) - J44.9      * Neb-NaCl 3% (Sodium Chloride 3% Neb) 4 ML VIAL.NEB: 4 ML INH RTBID 90 Days       #180      * ARFORMOTEROL TARTRATE (Brovana) 15 MCG/2 ML VIAL.NEB          Sample - Qty 8      Renewed Medications      * ARFORMOTEROL TARTRATE (Brovana) 15 MCG/2 ML VIAL.NEB:         From: 15 MCG INH RTBID #60         To: 15 MCG INH RTBID 90 Days #180         Instructions: Dx: J44.9      * TIOTROPIUM BROMIDE (Spiriva Respimat 2.5 mcg/Puff) 4 GM MIST.INHAL:         From: 2 PUFFS INH RTQDAY #1         To: 2 PUFFS INH RTQDAY 90 Days #3      *  Neb-Budesonide (Pulmicort) 0.5 MG/2 ML AMPUL.NEB:         From: 0.5 MG INH BID 28 Days #60         To: 0.5 MG INH BID 90 Days #180         Instructions: DX: J44.9         Dx: Dyspnea - R06.00      Discontinued Medications      * NEB-Albuterol Sulf (Albuterol) 2.5 MG/3 ML VIAL.NEB: 2.5 MG INH Q6H PRN       SHORTNESS OF BREATH/WHEEZING 30 Days #120         Instructions: Submit to Medicare B if applicable. Call for Diagnosis if        needed.         Dx: COPD (chronic obstructive pulmonary disease) with chronic bronchitis -        J44.9      * ISAVUCONAZONIUM SULFATE (Cresemba) 186 MG CAPSULE: 372 MG PO QDAY 28 Days #56      New Diagnostics      * Chest W/O Cont CT, 4 Months         Dx: Abnormal chest CT - R93.89      ASSESSMENT:      1. Aspergillus pneumonia resolved.       2. Chronic obstructive pulmonary disease without acute exacerbation, has asthma     overlap syndrome of moderate severity. The patient is on triple inhaler therapy     GOLD stage chronic obstructive pulmonary disease.       3. Nocturnal hypoxia on nighttime oxygen at 2 liters per minute via nasal     cannula.       4. History of congenital heart disease with atrial septal defect  with pulmonary    hypertension.       5. Atrial fibrillation well controlled.       6. Chronic dyspnea at baseline.       7. Difficulty with airway clearance.      8. Tobacco abuse of cigarettes in remission.             PLAN:      1. The patient is having difficulty with airway clearance. I will start the     patient on a flutter valve to use twice daily. I will also start the patient on     DuoNeb and sodium chloride nebulizer. The patient is advised to use DuoNeb     followed by sodium chloride nebulizer followed by flutter valve twice daily.       2. I will repeat a CT scan of the chest in 4 months.       3. Samples of brovana were given to the patient in the office today.       4. Medications were refilled through [facility] per the patient's request.       5.  The patient is advised to call the office, call 911 or go to the ER for any     new or worsening symptoms.       6. I will request a copy of echocardiogram be sent to our office. The patient     states she had this completed through Dr. Lucero (Cardiologist).       7. The patient reports she is up to date with flu and pneumonia vaccines. The     patient is advised to receive the COVID-19 vaccine when available.  The patient     is advised to follow CDC recommendations such as social distancing, wearing a     mask and washing hand for at least 20 seconds.      8. Follow up in 4 months, sooner if needed.            Patient Education      Patient Education Provided:  COPD      Time Spent:  > 50% /[facility]            Patient Education:        Chronic Obstructive Pulmonary Disease            Electronically signed by TRISHA CALDERÓN [facility]  02/25/2021 09:30       Disclaimer: Converted document may not contain table formatting or lab diagrams. Please see [email or web address] for the authenticated document.
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
## 2021-05-28 NOTE — PROGRESS NOTES
Patient: MARY LAMA     Acct: [id number]     Report: #[id number]  UNIT #: [id number]     : 1955    Encounter Date:10/29/2020  PRIMARY CARE: SVETA MADSEN  ***Signed***  --------------------------------------------------------------------------------------------------------------------  Chief Complaint      Encounter Date      Oct 29, 2020            Primary Care Provider      SVETA MADSEN            Referring Provider      SVETA MADSEN            Patient Complaint      Patient is complaining of      PT here today for F/U, COPD            VITALS      Height 5 ft 4 in / 162.56 cm      Weight 180 lbs  / 81.467554 kg      BSA 1.87 m2      BMI 30.9 kg/m2      Temperature 97.6 F / 36.44 C - Temporal      Pulse 88      Respirations 15      Blood Pressure 112/70 Sitting, Left Arm      Pulse Oximetry 96%, room air            HPI      The patient is a 65 year old female patient of Dr. De Los Santos with aspergillus     pneumonia who is currently taking Cresemba.  The patient also has a history of     cardiomyopathy and ASD. The patient also has intermittent atrial fibrillation     and is on Tikosyn and followed by Dr. Lucero and Dr. Tavarez.  The patient     presents for follow up visit today. The patient states since last visit her     breathing is doing better.  The patient states that she will get short of breath    that is worse with exertion, moderate in severity and improved with rest. The     patient states at times she does cough up thick white to yellow secretions.  The    patient states she is currently taking Budesonide, Brovana and Spiriva everyday     as prescribed and uses albuterol inhaler as needed. The patient currently denies    any fever, chills, night sweats, hemoptysis, purulent sputum production, swollen    glands in the head and neck, chest pain, chest tightness, nausea, vomiting,     diarrhea.  The patient denies any headaches, myalgias, sore throat, changes in     sense of taste and/or smell or  any other coronavirus or flu-like symptoms.  The     patient states she is able to perform her ADLs.            I have personally reviewed the review of systems, past family, social, surgical     and medical histories and I agree with those as entered in the chart.      Copies To:   NILTON COOPER      Constitutional:  Denies: Fatigue, Fever, Weight gain, Weight loss, Chills,     Insomnia, Other      Respiratory/Breathing:  Complains of: Shortness of air, Wheezing, Cough; Denies:    Hemoptysis, Pleuritic pain, Other      Endocrine:  Denies: Polydipsia, Polyuria, Heat/cold intolerance, Abnorml     menstrual pattern, Diabetes, Other      Eyes:  Denies: Blurred vision, Vision Changes, Other      Ears, nose, mouth, throat:  Denies: Congestion, Dysphagia, Hearing Changes, Nose    Bleeding, Nasal Discharge, Throat pain, Tinnitus, Other      Cardiovascular:  Denies: Chest Pain, Exertional dyspnea, Peripheral Edema,     Palpitations, Syncope, Wake up Gasping for air, Orthopnea, Tachycardia, Other      Gastrointestinal:  Denies: Abdominal pain/cramping, Bloody stools, Constipation,    Diarrhea, Melena, Nausea, Vomiting, Other      Genitourinary:  Denies: Dysuria, Urinary frequency, Incontinence, Hematuria,     Urgency, Other      Musculoskeletal:  Denies: Joint Pain, Joint Stiffness, Joint Swelling, Myalgias,    Other      Hematologic/lymphatic:  DENIES: Lymphadenopathy, Bruising, Bleeding tendencies,     Other      Neurologic:  Denies: Headache, Numbness, Weakness, Seizures, Other      Psychiatric:  Denies: Anxiety, Appropriate Effect, Depression, Other      Sleep:  No: Excessive daytime sleep, Morning Headache?, Snoring, Insomnia?, Stop    breathing at sleep?, Other      Integumentary:  Denies: Rash, Dry skin, Skin Warm to Touch, Other            FAMILY/SOCIAL/MEDICAL HX      Surgical History:  Yes: Abdominal Surgery (HERNIA), Bowel Surgery (COLONOSCOPY),    Eye Surgery (cataract), Head Surgery (BILATERAL  CATARACTS-), Hernia Surgery,     Oral Surgery (EGD), Orthopedic Surgery (RTH), Other Surgeries (open heart 5 to 6    heart ablations has pace maker ); No: AAA Repair, Adenoids, Angioplasty,     Appendectomy, Back Surgery, Bladder Surgery, Breast Surgery, CABG, Carotid     Stenosis, Cholecystectomy, Ear Surgery, Kidney Surgery, Nose Surgery,     Prostatectomy, Rectal Surgery, Spinal Surgery, Testicular Surgery, Throat Velasquez    rgery, Tonsils, Valve Replacement, Vascular Surgery      Diabetes - Family Hx:  Brother, Sister      Cancer/Type - Family Hx:  Mother, Brother      Social History:  Tobacco Use      Smoking status:  Former smoker ((quit  1ppd for 25 to 30- years ))       Section:  No      Tubal Ligation:  Yes (partial)      Hysterectomy:  Yes (Partial)      Anticoagulation Therapy:  Yes      Antibiotic Prophylaxis:  No      Medical History:  Yes: Arthritis, Asthma (INHALER), Chronic Bronchitis/COPD,     Congestive Heart Failu, Shortness Of Breath (COUGH), Miscellaneous Medical/oth     (pace-maker/ h/o VSD as a child s/p repair); No: Anemia, Blood Disease, Broken     Bones, Cataracts, Chemical Dependency, Chemotherapy/Cancer, Emphysema, Chronic     Liver Disease, Colon Trouble, Colitis, Diverticulitis, Deafness or Ringing Ears,    Depression, Anxiety, Bipolar Disorder, PTSD, Diabetes, Epilepsy, Seizures,     Glaucoma, Gall Stones, Gout, Head Injury, Heart Attack, Heart Murmur, GERD,     Hemorrhoids/Rectal Prob, Hepatitis, Hiatal Hernia, High Blood Pressure, HIV (Do     not ask - volu, Kidney or Bladder Disease, Kidney Stones, Migrane Headaches,     Mitral Valve Prolapse, Psychiatric Care, Reflux Disease, Rheumatic Fever,     Sexually Transmitted Dis, Sinus Trouble, Skin Disease/Psoriais/Ecz, Stroke,     Thyroid Problem, Tuberculosis or Pos TB Te      Psychiatric History      none            PREVENTION      Hx Influenza Vaccination:  Yes      Date Influenza Vaccine Given:  Sep 1, 2020      Influenza  Vaccine Declined:  No      2 or More Falls in Past Year?:  No      Fall Past Year with Injury?:  No      Hx Pneumococcal Vaccination:  Yes      Encouraged to follow-up with:  PCP regarding preventative exams.      Chart initiated by      Tova Lopes CMA            ALLERGIES/MEDICATIONS      Allergies:        Coded Allergies:             ADENOSINE (Verified  Allergy, Severe, NAUSEA, 8/12/20)           CODEINE (Verified  Allergy, Severe, NAUSEA, 8/12/20)      Medications    Last Reconciled on 10/29/20 16:24 by TRISHA CALDERÓN       Tiotropium Bromide (Spiriva Respimat 2.5 mcg/Puff) 4 Gm Mist.inhal      2 PUFFS INH RTQDAY, #1 MDI 3 Refills         Prov: TR[facility]N [facility]         10/29/20       Arformoterol Tartrate (Brovana) 15 Mcg/2 Ml Vial.neb      15 MCG INH RTBID, #60 NEB 3 Refills         Prov: TRISHA CALDERÓN [facility]         10/14/20       Isavuconazonium Sulfate (Cresemba) 186 Mg Capsule      372 MG PO QDAY for 28 Days, #56 CAPSULE 2 Refills         Prov: TRISHA CALDER[facility]N [facility]         10/14/20       Neb-Budesonide (Pulmicort) 0.5 Mg/2 Ml Ampul.neb      0.5 MG INH BID, #60 NEB 5 Refills         Prov: TRISHA CALDERÓN [facility]         2/20/20       Promethazine HCl/DM (Promethazine DM) 473 Ml Syrup      5 ML PO Q6H PRN for COUGH, #120 ML 0 Refills         Reported         2/20/20       Linaclotide (Linzess) 145 Mcg Capsule      145 MCG PO QDAY, CAP         Reported         2/20/20       NEB-Albuterol Sulf (Albuterol) 2.5 Mg/3 Ml Vial.neb      2.5 MG INH Q6H PRN for SHORTNESS OF BREATH/WHEEZING for 30 Days, #120 NEB 4     Refills         Prov: NILTON COOPER         10/2/19       Furosemide* (Lasix*) 40 Mg Tablet      40 MG PO QDAY, #30 TAB 0 Refills         Reported         10/30/18       Temazepam (Temazepam) 30 Mg Capsule      30 MG PO HS, CAP         Reported         1/24/18       Levothyroxine (Levothyroxine) 0.075 Mg Tablet      0.075 MG PO QDAY@07, #30 TAB 0 Refills         Reported         1/24/18        Spironolactone (Aldactone) 25 Mg Tablet      25 MG PO QDAY, #60 TAB 0 Refills         Reported         1/24/18       Rosuvastatin Calcium (Crestor*) 20 Mg Tablet      20 MG PO HS, #30 TAB 0 Refills         Reported         1/24/18       Metoprolol Tartrate (Metoprolol Tartrate) 25 Mg Tablet      25 MG PO HS, #90 TAB 0 Refills         Reported         1/24/18       Dofetilide (Tikosyn) 0.5 Mg Cap      0.5 MG PO BID, CAP         Reported         1/24/18       Apixaban (Eliquis) 5 Mg Tablet      5 MG PO BID for 30 Days, #60 TAB         Reported         1/24/18      Current Medications      Current Medications Reviewed 10/29/20            EXAM      Vital Signs Reviewed.      General:  WDWN, Alert, NAD.      HEENT: PERRL, EOMI.  OP, nares clear, no sinus tenderness.      Neck: Supple, no JVD, no thyromegaly.      Lymph: No axillary, cervical, supraclavicular lymphadenopathy noted bilaterally.      Chest: Mildly decreased breath sounds throughout, no wheezes, rales or rhonchi     appreciated, normal work of breathing noted.  Patient is able to speak full     sentences without difficulty.        CV: RRR, no MGR, pulses 2+, equal.        Abd: Soft, NT, ND, +BS, no HSM.      EXT: No clubbing, no cyanosis, no edema, no joint tenderness.        Neuro:  A  Skin: No rashes or lesions.      Vitals      Vitals:             Height 5 ft 4 in / 162.56 cm           Weight 180 lbs  / 81.752458 kg           BSA 1.87 m2           BMI 30.9 kg/m2           Temperature 97.6 F / 36.44 C - Temporal           Pulse 88           Respirations 15           Blood Pressure 112/70 Sitting, Left Arm           Pulse Oximetry 96%, room air            REVIEW      Results Reviewed      [facility] Results Reviewed?:  Yes Prev Lab Results, Yes Prev Radiology Results, Yes     Previous [facility] Records            Assessment      Medication monitoring encounter - Z51.81            Notes      Renewed Medications      * TIOTROPIUM BROMIDE (Spiriva Respimat 2.5  mcg/Puff) 4 GM MIST.INHAL: 2 PUFFS       INH RTQDAY #1      Discontinued Medications      * ISAVUCONAZONIUM SULFATE (Cresemba) 186 MG CAPSULE: 372 MG PO Q8HR #12      * predniSONE (Deltasone) 10 MG TABLET: 10 MG PO ASDIR #45         Instructions: [id number],41mrt4l,91qyy2c,16nmr5z,24ihi4g      * Amoxicillin/Clavulanic Acid 875/125 (Augmentin 875/125) 1 EACH TABLET: 875 MG       PO BID 7 Days #14      New Diagnostics      * Comp Metabolic Panel, Month         Dx: Medication monitoring encounter - Z51.81      * Comp Metabolic Panel, 2 Months         Dx: Medication monitoring encounter - Z51.81      IMPRESSION:      1. Aspergillus pneumonia on cresemba.      2.  Moderate chronic obstructive lung disease with COPD and asthma overlap     syndrome with a history of recurrent exacerbations.      3. Subpleural scarring.      4. Restrictive lung disease based on pulmonary function test.      5. Nocturnal hypoxemia on supplemental oxygen nightly.      6. History of heart failure, pulmonary hypertension and ASD followed by DrNuzhat Tavarez, and Dr. Lucero. She is on several antiarrhythmics.      7. Severe tricuspid valve regurgitation.      8.  Chronic cough and COPD.      9.  Former tobacco abuse with cigarettes in remission.              PLAN:      1.  The patient to continue Pulmicort, Brovana and Spiriva everyday as     prescribed and rinse her mouth out after each use.      2. The patient to continue albuterol inhaler as needed.      3. The patient to continue Cresemba for a total of six months of treatment. I     will reorder a CMP to be done in one month and CMP ordered for medication     monitoring today.      4.  Patient is advised to call the office, 911 or go to the ER with any new or     worsening symptoms.      5. The patient reports she is up-to-date with flu and pneumonia vaccines.      6. Follow up in 2-3 months, sooner if needed.            Patient Education      Patient Education Provided:  COPD       Time Spent:  > 50% /Coord Care            Electronically signed by TRISHA CALDERÓN [facility]  11/02/2020 16:59       Disclaimer: Converted document may not contain table formatting or lab diagrams. Please see [employer] [facility] [employer] [facility] [employer] [facility] for the authenticated document.

## 2021-05-28 NOTE — PROGRESS NOTES
Patient: MARY LAMA     Acct: [id number]     Report: #[id number]  UNIT #: [id number]     : 1955    Encounter Date:2018  PRIMARY CARE: SVETA MADSEN  ***Signed***  --------------------------------------------------------------------------------------------------------------------  Chief Complaint      Encounter Date      2018            Primary Care Provider      FLEX CUMMINGS            Referring Provider      SVETA MADSEN            Patient Complaint      Patient is complaining of      Shortness of breath/Dyspnea            VITALS      Height 5 ft 4 in / 162.56 cm      Weight 173 lbs 9 oz / 78.774912 kg      BSA 1.91 m2      BMI 29.8 kg/m2      Temperature 98.0 F / 36.67 C - Oral      Pulse 96      Respirations 12      Blood Pressure 134/55 Sitting, Left Arm      Pulse Oximetry 96%, roomair@rest      Exhaled Nitrous Oxide Testin            HPI      The patient is a very pleasant 62 year old female with a 30 pack year smoking     history who returns for regarding shortness of breath and recurrent bronchitis.             She has a history of sleep apnea but does not wear her sleep apnea mask. 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 She has a history of cardiac     ablations in the past and is followed closely by cardiology. In fact, she tells     me that since she last saw me she was evaluate by her cardiologist and had a     repeat echocardiogram that showed severely dilated right ventricle, dilated     right atrium, moderate to severe tricuspid regurgitation and RV SP of 47. She     follows back up with her cardiologist in August and states that he is not     planning on doing any interventions any time soon to her knowledge.             In regards to her breathing, the patient states that she gets short of breath     on bad days as she is unable to do much and has to sit around. On a  good day     she is active and complete household chores, go grocery shopping and go out of     the house. She does not wear supplemental oxygen at home. She complains of     intermittent wheezing and coughing that is nonproductive. These do not seem to     be triggered by anything in particular or relieved by anything in particular.     She uses an albuterol inhaler as needed and this is sporadic. She does find     some relief with it when she uses it for her dyspnea.             Review of Systems as noted.             Past family, medical, surgical and social histories were all reviewed by myself     with the patient and are unchanged.            Medications were reviewed by myself with the patient and updated in the chart.            ROS      Constitutional:  Denies: Fatigue, Fever, Weight gain, Weight loss, Chills,     Insomnia, Other      Respiratory/Breathing:  Complains of: Shortness of air, Wheezing, Cough, Denies    : Hemoptysis, Pleuritic pain, Other      Endocrine:  Denies: Polydipsia, Polyuria, Heat/cold intolerance, Abnorml     menstrual pattern, Diabetes, Other      Eyes:  Denies: Blurred vision, Vision Changes, Other      Ears, nose, mouth, throat:  Denies: Mouth lesions, Thrush, Throat pain,     Hoarseness, Allergies/Hay Fever, Post Nasal Drip, Headaches, Recent Head Injury    , Nose Bleeding, Neck Stiffness, Thyroid Mass, Hearing Loss, Ear Fullness, Dry     Mouth, Nasal or Sinus Pain, Dry Lips, Nasal discharge, Nasal congestion, Other      Cardiovascular:  Denies: Palpitations, Syncope, Claudication, Chest Pain, Wake     up Gasping for air, Leg Swelling, Irregular Heart Rate, Cyanosis, Dyspnea on     Exertion, Other      Gastrointestinal:  Denies: Nausea, Constipation, Diarrhea, Abdominal pain,     Vomiting, Difficulty Swallowing, Reflux/Heartburn, Dysphagia, Jaundice, Bloating    , Melena, Bloody stools, Other      Genitourinary:  Denies: Urinary frequency, Incontinence, Hematuria, Urgency,      Nocturia, Dysuria, Testicular problems, Other      Musculoskeletal:  Denies: Joint Pain, Joint Stiffness, Joint Swelling, Myalgias    , Other      Hematologic/lymphatic:  DENIES: Lymphadenopathy, Bruising, Bleeding tendencies,     Other      Neurological:  Denies: Headache, Numbness, Weakness, Seizures, Other      Psychiatric:  Denies: Anxiety, Appropriate Effect, Depression, Other      Sleep:  No: Excessive daytime sleep, Morning Headache?, Snoring, Insomnia?,     Stop breathing at sleep?, Other      Integumentary:  Denies: Rash, Dry skin, Skin Warm to Touch, Other      Immunologic/Allergic:  Denies: Latex allergy, Seasonal allergies, Asthma,     Urticaria, Eczema, Other      Immunization status:  No: Up to date            FAMILY/SOCIAL/MEDICAL HX      Surgical History:  Yes: Eye Surgery (cataract), Hernia Surgery, Orthopedic     Surgery (replacement hip on right), Other Surgeries (open heart 5 to 6 heart     ablations has pace maker )      Diabetes - Family Hx:  Brother, Sister      Cancer/Type - Family Hx:  Mother (breast), Brother ( 2005 skin )      Is Father Still Living?:  No      Is Mother Still Living?:  No      Social History:  Tobacco Use      Smoking status:  Former smoker (quit  1ppd for 25 to 30- years )      Tubal Ligation:  Yes (partial)      Anticoagulation Therapy:  Yes      Antibiotic Prophylaxis:  Yes      Medical History:  Yes: Asthma, Congestive Heart Failu, Miscellaneous Medical/    oth (pace-maker/ h/o VSD as a child s/p repair), No: Sinus Trouble      Psychiatric History      None            PREVENTION      Hx Influenza Vaccination:  Yes      Date Influenza Vaccine Given:  Oct 1, 2017      Influenza Vaccine Declined:  No      2 or More Falls Past Year?:  No      Fall Past Year with Injury?:  No      Hx Pneumococcal Vaccination:  Yes      Encouraged to follow-up with:  PCP regarding preventative exams.      Chart initiated by      Nidhi Recinos            ALLERGIES/MEDICATIONS       Allergies:        Coded Allergies:             Adenosine (Verified  Allergy, Unknown, 4/20/18)           Codeine (Verified  Allergy, Unknown, 4/20/18)      Medications    Last Reconciled on 4/20/18 18:11 by NILTON COOPER DO      (oxygen 2 )   No Conflict Check               Prov: NILTON COOPER         1/24/18       MDI-Albuterol (Ventolin HFA*) 18 Gm Hfa.aer.ad      2 PUFFS INH Q4-6H Y for SHORTNESS OF BREATH, #1 MDI 0 Refills         Reported         1/24/18       Temazepam (Temazepam) 30 Mg Capsule      30 MG PO HS, CAP         Reported         1/24/18       Levothyroxine (Levothyroxine) 0.075 Mg Tablet      0.075 MG PO QDAY@07, #30 TAB 0 Refills         Reported         1/24/18       Spironolactone (Aldactone) 25 Mg Tablet      50 MG PO QDAY, #60 TAB 0 Refills         Reported         1/24/18       Rosuvastatin Calcium (Crestor*) 20 Mg Tablet      20 MG PO HS, #30 TAB 0 Refills         Reported         1/24/18       Metoprolol Tartrate (Metoprolol Tartrate*) 25 Mg Tablet      25 MG PO TID, #90 TAB 0 Refills         Reported         1/24/18       Dofetilide (Tikosyn) 0.5 Mg Cap      0.5 MG PO BID, CAP         Reported         1/24/18       Apixaban (Eliquis) 5 Mg Tablet      5 MG PO BID for 30 Days, #60 TAB         Reported         1/24/18      Current Medications      Current Medications Reviewed 4/20/18            EXAM      Vital signs reviewed.      GENERAL:  The patient appears in no acute distress, she is resting and     breathing easily on room air.       HEENT: Pupils are equally round and reactive to light and accommodation.      Extraocular muscles intact bilateral. Nares patent without hypertrophy of the     turbinates.  TM's are clear bilaterally. Small oropharynx without lesions or     erythema.       NECK:  Supple without tracheal deviation or lymphadenopathy. No thyromegaly     appreciated.       LYMPHATICS: No cervical or supraclavicular lymphadenopathy.       RESPIRATORY: Diminished breath  sounds at the bases with no overt  wheezes,     rales or rhonchi, tympanic to percussion.      CVS:  Regular rate and rhythm, there is a loud 3/6 holosystolic murmur heard     all throughout the anterior listening posts that radiates to the axilla,  no     rubs or gallops, no lower extremity edema, equal radial pulses.      GI: Abdomen soft, nontender, nondistended, no hepatomegaly appreciated.  Bowel     sounds present in all 4 quadrants.       MUSCULOSKELETAL: No erythema, warmth or fluctuance over the major joints     including the knees, ankles, wrists and elbows.        SKIN: No rashes or lesions.       NEUROLOGICAL: Alert and oriented X 3.  No focal deficits on exam. Cranial     nerves II-XII intact bilaterally.       PSYCH: Patient has appropriate mood and affect.      Vtials      Vitals:             Height 5 ft 4 in / 162.56 cm           Weight 173 lbs 9 oz / 78.550111 kg           BSA 1.91 m2           BMI 29.8 kg/m2           Temperature 98.0 F / 36.67 C - Oral           Pulse 96           Respirations 12           Blood Pressure 134/55 Sitting, Left Arm           Pulse Oximetry 96%, [email or web address]@rest            REVIEW      Results Reviewed      PCCS Results Reviewed?:  Yes Prev Lab Results, Yes Prev Radiology Results, Yes     Previous [facility] Records      Micro Results      I personally reviewed the patient's echocardiogram performed at an outlying     facility. She had records of this with her today.      Radiographic Results      I personally reviewed the patient's low dose screening CT scan of the chest     performed 03/16/18. This showed chronic interstitial changes consistent with UIP    , enlarged pulmonary arteries reflecting possible pulmonary arterial     hypertension and cardiomegaly with right atrial enlargement. There was     atelectasis and scarring in the right middle lobe and lingula. There was a left     adrenal nodule/lesion also seen.      PFT Results      I personally reviewed the patient's  pulmonary function tests performed 02/15/    18. This showed moderate restrictive process and reduced diffusion capacity.            PLAN      Assessment      UIP (usual interstitial pneumonitis) - J84.112            Notes      New Diagnostics      * RA Profile, Routine       Dx: UIP (usual interstitial pneumonitis) - J84.112      * FAVIOLA Screen/Reflex Hep Titer, Month       Dx: UIP (usual interstitial pneumonitis) - J84.112      * Comp Metabolic Panel, Month       Dx: UIP (usual interstitial pneumonitis) - J84.112      * Scl70 Antibody, Week       Dx: UIP (usual interstitial pneumonitis) - J84.112      * Centromere Antibody , Week       Dx: UIP (usual interstitial pneumonitis) - J84.112      * Sjogrens Ssa Antibod, Week       Dx: UIP (usual interstitial pneumonitis) - J84.112      * Sjogrens Ssb Antibod, Week       Dx: UIP (usual interstitial pneumonitis) - J84.112      * Ribonucleoprotein particle, Week       Dx: UIP (usual interstitial pneumonitis) - J84.112      * Hiv 1 By Eia W/West , As Soon As Possible       Dx: UIP (usual interstitial pneumonitis) - J84.112      ASSESSMENT:      The patient is a very pleasant 62 year old female former tobacco user  with     history of chronic congestive heart failure, atrial fibrillation status post     cardiac ablations who presents for follow up regarding dyspnea.             1. Usual interstitial pneumonitis.       2. Moderate restrictive lung disease secondary to #1.      3. Right ventricular failure.      4. Pulmonary hypertension, likely secondary to pulmonary fibrosis/interstitial     lung disease.      5. Severe tricuspid valve regurgitation.       6. Dyspnea on exertion.       7. Former tobacco abuse with cigarettes in remission.             PLAN:            1. I have placed an order for rheumatologic work up including FAVIOLA, SCL-70     antibodies, centromere antibodies, Sjogren's antibodies, ribonuclear protein     antibodies and HIV testing. If there is no discernible  cause for the patient's     UIP she will need to be followed closely with repeat imaging at least once     yearly and pulmonary function tests.       2. In regards to her pulmonary hypertension, I would recommend a right heart     catheterization with in vivo vasodilator testing.  She is followed by a     cardiologist and will follow up with him. I will defer to him at this time as     she also needs a heart catheterization to rule out reversible ischemia.       3. Continue albuterol as needed.       4. I will follow up with her in short interval.            Patient Education      Education resources provided:  Yes      Patient Education Provided:  Pulmonary Hypertension                 Disclaimer: Converted document may not contain table formatting or lab diagrams. Please see [employer] for the authenticated document.

## 2021-05-28 NOTE — PROGRESS NOTES
Patient: MARY LAMA     Acct: [id number]     Report: #[id number]  UNIT #: [id number]     : 1955    Encounter Date:2019  PRIMARY CARE: SVETA MADSEN  ***Signed***  --------------------------------------------------------------------------------------------------------------------  Chief Complaint      Encounter Date      2019            Primary Care Provider      FLEX CUMMINGS            Referring Provider      SVETA MADSEN            Patient Complaint      Patient is complaining of      Pt here for 4 month follow up/chest ct, PFT, 6 min walk, lab results/Pulmonary     hypertension            VITALS      Height 5 ft 4 in / 162.56 cm      Weight 179 lbs 4 oz / 81.722832 kg      BSA 1.87 m2      BMI 30.8 kg/m2      Temperature 97.9 F / 36.61 C - Oral      Pulse 93      Respirations 16      Blood Pressure 110/73 Sitting, Left Arm      Pulse Oximetry 96%, room air      Initial Exhaled Nitrous Oxide      Exhaled Nitrous Oxide Results:  20            HPI      The patient is a 63 year old female former tobacco user with 30 pack year     smoking history. She is here today for follow up regarding dyspnea, chronic     obstructive pulmonary disease and pulmonary hypertension. She is followed by     cardiology who manages all her medications including Tikosyn. She has history of    atrial fibrillation requiring several cardiac ablations and she ultimately had a    pacemaker placed. She has chronic hypoxic respiratory failure and wears 2 liters    of oxygen at all times via oxygen concentrator in her home. She would like to     have a portable oxygen concentrator  as she would like to get out of the house     and be more active in her lifestyle. She has an up coming graduation to attend     in [place] and cannot go secondary to being too weak to carry the heavy     portable tanks. She has had no exacerbations of chronic obstructive pulmonary     disease or asthma over the past 6 months and no  antibiotics or steroids. He does    hear herself wheeze occasionally at night when she lies down or intermittently     throughout the day. She is short of breath with minimal exertion or even with     talking long winded sentences. She coughs but it is nonproductive. She denies     pleuritic chest pain, lower extremity edema, fevers or fatigue. She uses an     albuterol inhaler but only intermittently as she finds it difficult to     coordinate the puffer with her inhalation timing. She is only on albuterol     nebulizers.             Review of Systems as noted.             Past family, medical, surgical and social histories were all reviewed by myself     with the patient and are unchanged.            Medications were reviewed by myself with the patient and updated in the chart.            ROS      Constitutional:  Denies: Fatigue, Fever, Weight gain, Weight loss, Chills,     Insomnia, Other      Respiratory/Breathing:  Complains of: Shortness of air, Wheezing, Cough; Denies:    Hemoptysis, Pleuritic pain, Other      Endocrine:  Denies: Polydipsia, Polyuria, Heat/cold intolerance, Abnorml     menstrual pattern, Diabetes, Other      Eyes:  Denies: Blurred vision, Vision Changes, Other      Ears, nose, mouth, throat:  Denies: Mouth lesions, Thrush, Throat pain,     Hoarseness, Allergies/Hay Fever, Post Nasal Drip, Headaches, Recent Head Injury,    Nose Bleeding, Neck Stiffness, Thyroid Mass, Hearing Loss, Ear Fullness, Dry     Mouth, Nasal or Sinus Pain, Dry Lips, Nasal discharge, Nasal congestion, Other      Cardiovascular:  Denies: Palpitations, Syncope, Claudication, Chest Pain, Wake     up Gasping for air, Leg Swelling, Irregular Heart Rate, Cyanosis, Dyspnea on     Exertion, Other      Gastrointestinal:  Denies: Nausea, Constipation, Diarrhea, Abdominal pain,     Vomiting, Difficulty Swallowing, Reflux/Heartburn, Dysphagia, Jaundice, Bloa    ting, Melena, Bloody stools, Other      Genitourinary:  Denies: Urinary  frequency, Incontinence, Hematuria, Urgency,     Nocturia, Dysuria, Testicular problems, Other      Musculoskeletal:  Denies: Joint Pain, Joint Stiffness, Joint Swelling, Myalgias,    Other      Hematologic/lymphatic:  DENIES: Lymphadenopathy, Bruising, Bleeding tendencies,     Other      Neurological:  Denies: Headache, Numbness, Weakness, Seizures, Other      Psychiatric:  Denies: Anxiety, Appropriate Effect, Depression, Other      Sleep:  No: Excessive daytime sleep, Morning Headache?, Snoring, Insomnia?, Stop    breathing at sleep?, Other      Integumentary:  Denies: Rash, Dry skin, Skin Warm to Touch, Other      Immunologic/Allergic:  Denies: Latex allergy, Seasonal allergies, Asthma,     Urticaria, Eczema, Other      Immunization status:  No: Up to date            FAMILY/SOCIAL/MEDICAL HX      Surgical History:  Yes: Abdominal Surgery (HERNIA), Bowel Surgery (COLONOSCOPY),    Eye Surgery (cataract), Head Surgery (BILATERAL CATARACTS-), Hernia Surgery,     Oral Surgery (EGD), Orthopedic Surgery (RTH), Other Surgeries (open heart 5 to 6    heart ablations has pace maker )      Diabetes - Family Hx:  Brother, Sister      Cancer/Type - Family Hx:  Mother (breast), Brother ( 2005 skin )      Is Father Still Living?:  No      Is Mother Still Living?:  No      Social History:  Tobacco Use      Smoking status:  Former smoker (quit  1ppd for 25 to 30- years )      Tubal Ligation:  Yes (partial)      Anticoagulation Therapy:  Yes      Antibiotic Prophylaxis:  Yes      Medical History:  Yes: Arthritis, Asthma (INHALER), Chronic Bronchitis/COPD,     Congestive Heart Failu, Shortness Of Breath, Miscellaneous Medical/oth (pace-    maker/ h/o VSD as a child s/p repair); No: Blood Disease, Chemotherapy/Cancer,     Deafness or Ringing Ears, Hemorrhoids/Rectal Prob, High Blood Pressure, Sinus Tr    ouble      Psychiatric History      None            PREVENTION      Hx Influenza Vaccination:  Yes      Date Influenza  Vaccine Given:  Oct 1, 2018      Influenza Vaccine Declined:  No      2 or More Falls Past Year?:  No      Fall Past Year with Injury?:  No      Hx Pneumococcal Vaccination:  Yes      Encouraged to follow-up with:  PCP regarding preventative exams.      Chart initiated by      Macy Calvo MA            ALLERGIES/MEDICATIONS      Allergies:        Coded Allergies:             ADENOSINE (Verified  Allergy, Severe, NAUSEA, 2/28/19)           CODEINE (Verified  Allergy, Severe, NAUSEA, 2/28/19)      Medications    Last Reconciled on 2/28/19 15:18 by NILTON COOPER DO      Furosemide* (Lasix*) 40 Mg Tablet      40 MG PO QDAY, #30 TAB 0 Refills         Reported         10/30/18       Polyethylene Glycol (Miralax*) 17 Gm Packet      17 GM PO Q2D PRN for CONSTIPATION, #15 PKT 0 Refills         Reported         6/1/18       (oxygen 2 )   No Conflict Check               Prov: NILTON COOPER         1/24/18       MDI-Albuterol (Ventolin HFA) 18 Gm Hfa.aer.ad      2 PUFFS INH Q4-6H PRN for SHORTNESS OF BREATH, #1 MDI 0 Refills         Reported         1/24/18       Temazepam (Temazepam) 30 Mg Capsule      30 MG PO HS, CAP         Reported         1/24/18       Levothyroxine (Levothyroxine) 0.075 Mg Tablet      0.075 MG PO QDAY@07, #30 TAB 0 Refills         Reported         1/24/18       Spironolactone (Aldactone) 25 Mg Tablet      50 MG PO QDAY, #60 TAB 0 Refills         Reported         1/24/18       Rosuvastatin Calcium (Crestor*) 20 Mg Tablet      20 MG PO HS, #30 TAB 0 Refills         Reported         1/24/18       Metoprolol Tartrate (Metoprolol Tartrate*) 25 Mg Tablet      25 MG PO HS, #90 TAB 0 Refills         Reported         1/24/18       Dofetilide (Tikosyn) 0.5 Mg Cap      0.5 MG PO BID, CAP         Reported         1/24/18       Apixaban (Eliquis) 5 Mg Tablet      5 MG PO BID for 30 Days, #60 TAB         Reported         1/24/18      Current Medications      Current Medications Reviewed 2/28/19            EXAM       Vital signs reviewed.      HEENT: Pupils are equally round and reactive to light and accommodation.      Extraocular muscles intact bilateral. Nares patent without hypertrophy of the     turbinates.  Small oropharynx without lesions or erythema.       NECK:  Supple without tracheal deviation or lymphadenopathy. No thyromegaly     appreciated.       LYMPHATICS: No cervical or supraclavicular lymphadenopathy.       RESPIRATORY:  Clear to auscultation bilaterally, no wheezes, rales or rhonchi,     tympanic to percussion.      CVS:  Regular rate and rhythm, grade 3/6 mid systolic murmur heard at all     anterior pre cordial listening posts, rubs or gallops, no lower extremity edema,    equal radial pulses.      GI: Abdomen soft, nontender, nondistended, no hepatomegaly appreciated.  Bowel     sounds present in all 4 quadrants.       MUSCULOSKELETAL: No erythema, warmth or fluctuance over the major joints     including the knees, ankles, wrists and elbows.        SKIN: No rashes or lesions.       NEUROLOGICAL: Alert and oriented X 3.  No focal deficits on exam. Cranial nerves    II-XII intact bilaterally.       PSYCH: Patient has appropriate mood and affect.      Vtials      Vitals:             Height 5 ft 4 in / 162.56 cm           Weight 179 lbs 4 oz / 81.492671 kg           BSA 1.87 m2           BMI 30.8 kg/m2           Temperature 97.9 F / 36.61 C - Oral           Pulse 93           Respirations 16           Blood Pressure 110/73 Sitting, Left Arm           Pulse Oximetry 96%, room air            REVIEW      Results Reviewed      PCCS Results Reviewed?:  Yes Prev Lab Results      Radiographic Results               [facility]                PACS RADIOLOGY REPORT            Patient: MARY LAMA   Acct: #[id number]   Report: #[id number]            UNIT #: [id number]    DOS: 02/01/19 1310      INSURANCE:[employer]   ORDER #:[id number]       LOCATION:CT     : 1955            PROVIDERS      ADMITTING:     ATTENDING: NILTON COOPER      FAMILY:  EMILEE CUMMINGSA   ORDERING:  NILTON COOPER         OTHER:    DICTATING:  BRYON ARCOS MD            REQ #:[id number]   EXAM:WO - CT CHEST without CONTRAST      REASON FOR EXAM:  J84.112      REASON FOR VISIT:  J84.112            *******Signed******         PROCEDURE:   CT CHEST WITHOUT CONTRAST             COMPARISON:   [facility], CT, CT LOW DOSE CHEST SCREENING,     3/16/2018, 11:11.             INDICATIONS:   PULMONARY FIBROSIS             TECHNIQUE:   CT images were created without the administration of contrast     material.               PROTOCOL:     Standard imaging protocol performed                RADIATION:     DLP: 543mGy*cm          Automated exposure control was utilized to minimize radiation dose.              FINDINGS:         Centrilobular emphysema.  Linear scarring or atelectasis in the right middle     lobe.  There is mild       subpleural interstitial prominence, unchanged from the previous study.  No tommy    honeycombing.        There are few tiny subpleural nodules in the left lower lobe and right lower     lobe that are       unchanged.  No new dense consolidation or pleural fluid.  Large cardiomegaly.      No pericardial       fluid.  Prominence of the pulmonary artery is is similar, and suggestive of     underlying pulmonary       arterial hypertension.  Prominence of the inferior vena cava and hepatic veins     suggesting a       component of right heart failure no acute findings in the included upper     abdomen.  Left adrenal       nodule is stable.  No aggressive appearing bone lesion.             CONCLUSION:   Centrilobular emphysema.             Mild subpleural interstitial prominence in the lung bases is favored to     represent subpleural       scarring.  No tommy honeycombing.             A few tiny nodules are unchanged.  Recommend patient continue  with yearly lung     cancer screening.             Large cardiomegaly with findings suggesting pulmonary arterial hypertension and     a component of       right heart failure.              BRYON ARCOS MD             Electronically Signed and Approved By: BRYON ARCOS MD on 2019 at 14:01                        Until signed, this is an unconfirmed preliminary report that may contain      errors and is subject to change.                                              MEHREENER:      D:19 1401      PFT Results      Patient: MARY HARRISON   Acct: #[id number]   Report: #[id number]            UNIT #: [id number]    DOS:       LOCATION:CT     : 1955            PROVIDERS      ADMITTING:     FAMILY:  FLEX CUMMINGS         OTHER:       DICTATING:  NILTON COOPER DO            REASON FOR VISIT:  J84.112            *******Signed******                                    [facility]                          [employer] Services                            [place], [place]  [address]               __________________________________________________________________________             Patient Name:                   Attending Physician:      Mary Harrison D.O.             Patient Visit # MR #            Admit Date  Disch Date     Location      [id number]    [id number]      2019                 CT- -             Date of Birth      1955      __________________________________________________________________________      821 - DIAGNOSTIC REPORT             PULMONARY FUNCTION TEST             SPIROMETRY:      FEV1/FVC ratio is 74.      FEV1 is 56% of predicted, 1.36 L.      FVC is 58% of predicted, 1.84 L.      There was no one-time response to bronchodilator administration.             LUNG VOLUMES:      Total lung capacity is 65% of predicted.      Vital capacity 57% of predicted, 1.83 L.             DIFFUSION CAPACITY:      DLCO  40% of predicted.             Compared to prior pulmonary function test report in February 2018 the FEV1      has declined 16%.             CONCLUSION:      1.  Abnormal spirometry with moderate obstructive defect.  There was no one          time response to bronchodilator administration.      2.  Lung volumes show moderate restrictive process.      3.  Diffusion capacity is moderately reduced.      4.  This can be seen with combined obstructive and restrictive defect          concerning for COPD and interstitial lung disease.             Please correlate clinically.             To be electronically signed in [facility]      [id number] NILTON COOPER D.O.             KM:vh      D:  02/24/2019 12:28      T:  02/25/2019 09:22      #[id number]             Until signed, this is an unconfirmed preliminary report that may contain      errors and is subject to change.                   Until signed, this is an unconfirmed preliminary report that may contain      errors and is subject to change.                     <Electronically signed by NILTON COOPER DO>                02/25/19 1156               NILTON COOPER DO                                                                  [facility]      D:02/24/19 1228            Assessment      ASSESSMENT:      1. Mild to moderate obstructive lung disease with chronic obstructive pulmonary     disease and asthma overlap.       2. Moderate severe asthma.       3. Moderate to severe restrictive lung disease.       4. Concern for interstitial lung disease.       5. Pulmonary hypertension ? WHO group.      6. Severe tricuspid valve regurgitation.       7. Atrial fibrillation on several anti-arrhythmics       8. Dyspnea on exertion.       9. Chronic hypoxic respiratory failure.       10. Former tobacco abuse with cigarettes in remission.             PLAN:            1. The patient wishes to be more mobile and I believe she would greatly benefit     from a portable oxygen concentrator. We will  have our respiratory therapist     Nadeen set this up for the patient today.         2. I have reviewed the imaging and testing with the patient today. She has been     fully worked up for any rheumatologic cause of interstitial lung disease and     this has been negative. Additionally, she does not have any tommy honeycombing     on her high resolution CT scan of the chest so this rules out UIP. Previous     documentation was incorrect and she likely just has some interstitial prominence    and scarring.      3. I did speak with her regarding pulmonary hypertension and management and     diagnosis thereof. She sees her cardiologist next month and would like for him     to be in charge of this and this is fine with me.       4. I will start Stiolto two puffs inhaled once daily. Continue albuterol as     needed.       5. Follow up in 2-3 months.            Patient Education      Patient Education Provided:  Acute Asthma, COPD, How to use an Inhaler,     Pulmonary Hypertension      Time Spent:  > 50% /[facility]                 Disclaimer: Converted document may not contain table formatting or lab diagrams. Please see [employer] for the authenticated document.

## 2021-05-28 NOTE — PROGRESS NOTES
Patient: MARY LAMA     Acct: [id number]     Report: #[id number]  UNIT #: [id number]     : 1955    Encounter Date:2018  PRIMARY CARE: SVETA MADSEN  ***Signed***  --------------------------------------------------------------------------------------------------------------------  Chief Complaint      Encounter Date      2018            Patient Complaint      Patient is complaining of       Er Referral Cough/Soa/Bronchitis/Self            VITALS      Height 5 ft 4 in / 162.56 cm      Weight 173 lbs 5 oz / 78.541960 kg      BSA 1.91 m2      BMI 29.7 kg/m2      Temperature 98.7 F / 37.06 C - Oral      Pulse 93      Respirations 12      Blood Pressure 124/45 Sitting, Left Arm      Pulse Oximetry 94%, room air@rest      Exhaled Nitrous Oxide Testin            HPI      The patient is a 62 year old female former smoker of 1 pack per day for 30     years and quit in  who was referred by the emergency room for cough and     bronchitis.             The patient reports having a history of sleep apnea and used to wear her CPAP     although secondary to mask fit she has been noncompliant with this. She has a     history of congestive heart failure, she has a pacemaker in place and had a     ventricular septal defect repaired as a child. She reports having 4-5 different     heart ablations and contributes a lot of her breathing issues secondary to her     underlying heart diseases. She states that has been treated for her cough with     antibiotic and feels that her cough is no better after treatment with     antibiotics. She notices her cough is worse when she is exposed to soy bean     fields and cats. She had a chest x-ray performed 17 showing a right     basilar infiltrate representing pneumonia. She states she was treated for this     and feels a little bit better but her cough is persistent for which she follows     up today.             PAST MEDICAL HISTORY:      1. Asthma.        2. Congestive heart failure.       3. Pacemaker with history of VST as a child status post repair.             PAST SURGICAL HISTORY:      1. Cataract surgery.       2. Hernia surgery.       3. Replacement of right hip.       4. Open heart, 5-6 ablations and pacemaker.             FAMILY HISTORY;      Significant for breast cancer in mother, skin cancer in brother who  in     . There is a history of diabetes in her siblings.             SOCIAL HISTORY:      The patient has a 30 pack year smoking history, quitting in . No illicit     drug use, no alcohol use.             Medications were reviewed by myself with the patient and updated in the chart.            ROS      Constitutional:  Denies: Fatigue, Fever, Weight gain, Weight loss, Chills,     Insomnia, Other      Respiratory/Breathing:  Complains of: Shortness of air, Wheezing, Cough, Denies    : Hemoptysis, Pleuritic pain, Other      Endocrine:  Denies: Polydipsia, Polyuria, Heat/cold intolerance, Abnorml     menstrual pattern, Diabetes, Other      Eyes:  Denies: Blurred vision, Vision Changes, Other      Ears, nose, mouth, throat:  Denies: Mouth lesions, Thrush, Throat pain,     Hoarseness, Allergies/Hay Fever, Post Nasal Drip, Headaches, Recent Head Injury    , Nose Bleeding, Neck Stiffness, Thyroid Mass, Hearing Loss, Ear Fullness, Dry     Mouth, Nasal or Sinus Pain, Dry Lips, Nasal discharge, Nasal congestion, Other      Cardiovascular:  Complains of: Dyspnea on Exertion, Denies: Palpitations,     Syncope, Claudication, Chest Pain, Wake up Gasping for air, Leg Swelling,     Irregular Heart Rate, Cyanosis, Other      Gastrointestinal:  Denies: Nausea, Constipation, Diarrhea, Abdominal pain,     Vomiting, Difficulty Swallowing, Reflux/Heartburn, Dysphagia, Jaundice, Bloating    , Melena, Bloody stools, Other      Genitourinary:  Complains of: Nocturia, Denies: Urinary frequency, Incontinence    , Hematuria, Urgency, Dysuria, Testicular problems,  Other      Musculoskeletal:  Denies: Joint Pain, Joint Stiffness, Joint Swelling, Myalgias    , Other      Hematologic/lymphatic:  DENIES: Lymphadenopathy, Bruising, Bleeding tendencies,     Other      Neurological:  Denies: Headache, Numbness, Weakness, Seizures, Other      Psychiatric:  Denies: Anxiety, Appropriate Effect, Depression, Other      Sleep:  No: Excessive daytime sleep, Morning Headache?, Snoring, Insomnia?,     Stop breathing at sleep?, Other      Integumentary:  Denies: Rash, Dry skin, Skin Warm to Touch, Other      Immunologic/Allergic:  Complains of: Seasonal allergies, Denies: Latex allergy,     Asthma, Urticaria, Eczema, Other      Immunization status:  No: Up to date            FAMILY/SOCIAL/MEDICAL HX      Surgical History:  Yes: Eye Surgery (cataract), Hernia Surgery, Orthopedic     Surgery (replacement hip on right), Other Surgeries (open heart 5 to 6 heart     ablations has pace maker )      Diabetes - Family Hx:  Brother, Sister      Cancer/Type - Family Hx:  Mother (breast), Brother ( 2005 skin )      Is Father Still Living?:  No      Is Mother Still Living?:  No      Social History:  Tobacco Use      Smoking status:  Former smoker (quit  1ppd for 25 to 30- years )      Tubal Ligation:  Yes (partial)      Anticoagulation Therapy:  Yes      Antibiotic Prophylaxis:  Yes      Medical History:  Yes: Asthma, Congestive Heart Failu, Miscellaneous Medical/    oth (pace-maker/ h/o VSD as a child s/p repair)            Hx Influenza Vaccination:  Yes      Influenza Vaccine Declined:  No      2 or More Falls Past Year?:  No      Fall Past Year with Injury?:  No      Hx Pneumococcal Vaccination:  Yes      Encouraged to follow-up with:  PCP regarding preventative exams.      Chart initiated by      marcos diaz ma            ALLERGIES/MEDICATIONS      Medications    Last Reconciled on 18 13:29 by NILTON COOPER DO      (oxygen 2 )   No Conflict Check               Prov: NILTON COOPER          1/24/18       MDI-Albuterol (Ventolin HFA*) 18 Gm Hfa.aer.ad      2 PUFFS INH Q4-6H Y for SHORTNESS OF BREATH, #1 MDI 0 Refills         Reported         1/24/18       Temazepam (Temazepam) 30 Mg Capsule      30 MG PO HS, CAP         Reported         1/24/18       Levothyroxine (Levothyroxine) 0.075 Mg Tablet      0.075 MG PO QDAY@07, #30 TAB 0 Refills         Reported         1/24/18       Spironolactone (Aldactone) 25 Mg Tablet      50 MG PO QDAY, #60 TAB 0 Refills         Reported         1/24/18       Rosuvastatin Calcium (Crestor*) 20 Mg Tablet      20 MG PO HS, #30 TAB 0 Refills         Reported         1/24/18       Metoprolol Tartrate (Metoprolol Tartrate*) 25 Mg Tablet      25 MG PO TID, #90 TAB 0 Refills         Reported         1/24/18       Dofetilide (Tikosyn) 0.5 Mg Cap      0.5 MG PO BID, CAP         Reported         1/24/18       Apixaban (Eliquis) 5 Mg Tablet      5 MG PO BID for 30 Days, #60 TAB         Reported         1/24/18      Current Medications      Current Medications Reviewed 1/24/18            EXAM      Vital signs reviewed.      GENERAL:        HEENT: Pupils are equally round and reactive to light and accommodation.      Extraocular muscles intact bilateral. Nares patent without hypertrophy of the     turbinates.  TM's are clear bilaterally. Small oropharynx without lesions or     erythema.       NECK:  Supple without tracheal deviation or lymphadenopathy. No thyromegaly     appreciated.       LYMPHATICS: No cervical or supraclavicular lymphadenopathy.       RESPIRATORY:  Clear to auscultation bilaterally, no wheezes, rales or rhonchi,     tympanic to percussion.      CVS:  Regular rate and rhythm, no murmurs, rubs or gallops, no lower extremity     edema, , equal radial pulses.      GI: Abdomen soft, nontender, nondistended, no hepatomegaly appreciated.  Bowel     sounds present in all 4 quadrants.       MUSCULOSKELETAL: No erythema, warmth or fluctuance over the major joints      including the knees, ankles, wrists and elbows.        SKIN: No rashes or lesions.       NEUROLOGICAL: Alert and oriented X 3.  No focal deficits on exam. Cranial     nerves II-XII intact bilaterally.       PSYCH: Patient has appropriate mood and affect.      Vtials      Vitals:             Height 5 ft 4 in / 162.56 cm           Weight 173 lbs 5 oz / 78.183145 kg           BSA 1.91 m2           BMI 29.7 kg/m2           Temperature 98.7 F / 37.06 C - Oral           Pulse 93           Respirations 12           Blood Pressure 124/45 Sitting, Left Arm           Pulse Oximetry 94%, room air@rest            REVIEW      Results Reviewed      PCCS Results Reviewed?:  Yes Prev Lab Results, Yes Prev Radiology Results, Yes     Previous Mecial Records      Lab Results      I personally reviewed the patient's comprehensive metabolic profile, CBC and     coagulants.      Radiographic Results               [facility]                PACS RADIOLOGY REPORT            Patient: MARY LAMA   Acct: #[id number]   Report: #[id number]            UNIT #: [id number]    DOS: 17      INSURANCE:[place] [employer]   ORDER #:[id number]      LOCATION:[facility]     : 1955            PROVIDERS      ADMITTING:     ATTENDING: SUKHWINDER DON      FAMILY:  SUKHWINDER DON   ORDERING:  SUKHWINDER DON         OTHER:    DICTATING:  Damir Hernandez MD, JR            REQ #:[id number]   EXAM:CXR2 - CHEST 2V AP PA LAT      REASON FOR EXAM:        REASON FOR VISIT:  R05            *******Signed******         PROCEDURE:   CHEST AP/PA AND LATERAL             COMPARISONS:   [facility], , CHEST AP/PA 1 VIEW, 2017, 18:    54.  [facility], , CHEST PA/AP   [facility],       CHEST AP/PA 1 VIEW, 2004, 5:44.           [facility], , RIBS LEFT, 2017, 19:24.             INDICATIONS:   PERSISTENT COUGH, WHICH BEGAN IN  MID NOVEMBER. THE PATIENT HAS     BEEN TREATED WITH       STEROIDS AND ANTIBIOTICS WITHOUT RELIEF OF SYMPTOMS.             FINDINGS:      2 views of the chest reveal moderate cardiomegaly, as described     previously. There is a       left-sided cardiac implantable electronic device (CIED) with right atrial and     right ventricular       leads, as before. There is old calcified granulomatous disease of the chest.     There may be mild       atelectasis and/or fibrosis in the lung bases. Acute infiltrate in the lateral     aspect of the right       lung base, probably within the right lower lobe, cannot be excluded. No     definite left-sided       infiltrate is seen. No pneumothorax is appreciated. No pleural effusion is     identified. There are       old healed left rib fractures. The patient has undergone median sternotomy.             CONCLUSION:      There is a possible new right basilar infiltrate. It may     represent pneumonia. Please       correlate clinically. Consider imaging follow-up to ensure a benign progression.             HOWARD SUMMERS JR, MD             Electronically Signed and Approved By: HOWARD SUMMERS JR, MD on 12/26/2017 at 21    :05                        Until signed, this is an unconfirmed preliminary report that may contain      errors and is subject to change.                                              MATHEW:      D:12/26/17 2105            PLAN      Assessment      Chronic cough - R05            Former cigarette smoker - Z87.891            Notes      New Medications      * Apixaban (Eliquis) 5 MG TABLET: 5 MG PO BID 30 Days #60      * Dofetilide (Tikosyn) 0.5 MG CAP: 0.5 MG PO BID      * Metoprolol Tartrate (Metoprolol Tartrate*) 25 MG TABLET: 25 MG PO TID #90      * Rosuvastatin Calcium (Crestor*) 20 MG TABLET: 20 MG PO HS #30      * SPIRONOLACTONE (Aldactone) 25 MG TABLET: 50 MG PO QDAY #60      * Levothyroxine 0.075 MG TABLET: 0.075 MG PO QDAY@07 #30      * Temazepam 30 MG  CAPSULE: 30 MG PO HS      * MDI-Albuterol (Ventolin HFA*) 18 GM HFA.AER.AD: 2 PUFFS INH Q4-6H PRN     SHORTNESS OF BREATH #1      * (oxygen 2 ):        Instructions: at bed time      New Diagnostics      * 6 Min Walk With Pulse Ox, Routine       Dx: Chronic cough - R05      * PFT-Comp, PrePost,DLCO,BodyBox, Week       Dx: Chronic cough - R05      * Low Dose Chest CT, Week       Dx: Former cigarette smoker - Z87.891      ASSESSMENT:      The patient is a 62 year old female with history of congestive heart failure,     atrial fibrillation status post cardiac ablations on Dofetilide and Apixaban     who presents for work up of chronic cough and dyspnea.             1. Chronic cough.       2. Dyspnea on exertion.       3. Former tobacco abuse with cigarettes in remission.       4. Recent right basilar pneumonia.             PLAN:            1. I have ordered full pulmonary function tests, six minute walk test and low     dose CT scan given her history of smoking and abnormal chest x-ray performed in     late December.  I will see her back in 3 months time to go over these results.     Her FENO in the office today was 20. I have asked her to continue using her     albuterol inhaler as needed until she has further work up established.            Patient Education      Education resources provided:  Yes      Patient Education Provided:  Acute Asthma, COPD, Lung Cancer                 Disclaimer: Converted document may not contain table formatting or lab diagrams. Please see [employer] for the authenticated document.

## 2021-05-28 NOTE — PROGRESS NOTES
Patient: MARY LAMA     Acct: [id number]     Report: #[id number]  UNIT #: [id number]     : 1955    Encounter Date:2020  PRIMARY CARE: SVETA MADSEN  ***Signed***  --------------------------------------------------------------------------------------------------------------------  Chief Complaint      Encounter Date      Aug 12, 2020            Primary Care Provider      SVETA MADSEN            Referring Provider      SVETA MADSEN            Patient Complaint      Patient is complaining of      4-6 week follow up/soa            VITALS      Height 5 ft 4 in / 162.56 cm      Weight 174 lbs 1 oz / 78.939696 kg      BSA 1.84 m2      BMI 29.9 kg/m2      Temperature 98.0 F / 36.67 C - Tympanic      Pulse 56      Respirations 16      Blood Pressure 122/80 Sitting, Right Arm      Pulse Oximetry 94%, room air      Initial Exhaled Nitrous Oxide      Exhaled Nitrous Oxide Results:  20            HPI      The patient is a 65 year old female with aspergillus pneumonia on Cresemba. She     has a history of cardiomyopathy and ASD. She has intermittent atrial     fibrillation, she is on  Tikosyn and followed by Dr. Lucero and Dr. Kelsey.     She is here today after being on Cresemba for the last 3 months. She says her     breathing is no better. She coughs up thick gummy yellow secretions all     throughout the day and night. She checks her oxygen and it does down into the     85% range.  She has oxygen and she will wear it all throughout the night or when    she is feeling short of breath at home. She is concerned because her      works around hay and she knows hay holds mold and he will come in and not change    his clothes and have her come help with hay minerva and feed the cows and she     notices a significant difference in her breathing when she is exposed to these     environmental factors. She is tearful today in the office, you can tell she does    not feel well. She has not been on a steroid  taper since March. She coughs     throughout the exam today.             Review of Systems as noted.            ROS      Constitutional:  Complains of: Fatigue; Denies: Fever, Weight gain, Weight loss,    Chills, Insomnia, Other      Respiratory/Breathing:  Complains of: Shortness of air, Wheezing (minimal),     Cough (yellow thick gummy, on and off); Denies: Hemoptysis, Pleuritic pain,     Other      Endocrine:  Denies: Polydipsia, Polyuria, Heat/cold intolerance, Abnorml     menstrual pattern, Diabetes, Other      Eyes:  Denies: Blurred vision, Vision Changes, Other      Ears, nose, mouth, throat:  Denies: Mouth lesions, Thrush, Throat pain,     Hoarseness, Allergies/Hay Fever, Post Nasal Drip, Headaches, Recent Head Injury,    Nose Bleeding, Neck Stiffness, Thyroid Mass, Hearing Loss, Ear Fullness, Dry     Mouth, Nasal or Sinus Pain, Dry Lips, Nasal discharge, Nasal congestion, Other      Cardiovascular:  Denies: Palpitations, Syncope, Claudication, Chest Pain, Wake     up Gasping for air, Leg Swelling, Irregular Heart Rate, Cyanosis, Dyspnea on     Exertion, Other      Gastrointestinal:  Denies: Nausea, Constipation, Diarrhea, Abdominal pain,     Vomiting, Difficulty Swallowing, Reflux/Heartburn, Dysphagia, Jaundice,     Bloating, Melena, Bloody stools, Other      Genitourinary:  Denies: Urinary frequency, Incontinence, Hematuria, Urgency,     Nocturia, Dysuria, Testicular problems, Other      Musculoskeletal:  Denies: Joint Pain, Joint Stiffness, Joint Swelling, Myalgias,    Other      Hematologic/lymphatic:  DENIES: Lymphadenopathy, Bruising, Bleeding tendencies,     Other      Neurological:  Denies: Headache, Numbness, Weakness, Seizures, Other      Psychiatric:  Denies: Anxiety, Appropriate Effect, Depression, Other      Sleep:  No: Excessive daytime sleep, Morning Headache?, Snoring, Insomnia?, Stop    breathing at sleep?, Other      Integumentary:  Denies: Rash, Dry skin, Skin Warm to Touch, Other       Immunologic/Allergic:  Denies: Latex allergy, Seasonal allergies, Asthma,     Urticaria, Eczema, Other      Immunization status:  No: Up to date            FAMILY/SOCIAL/MEDICAL HX      Surgical History:  Yes: Abdominal Surgery (HERNIA), Bowel Surgery (COLONOSCOPY),    Eye Surgery (cataract), Head Surgery (BILATERAL CATARACTS-), Hernia Surgery,     Oral Surgery (EGD), Orthopedic Surgery (RTH), Other Surgeries (open heart 5 to 6    heart ablations has pace maker ); No: AAA Repair, Adenoids, Angioplasty,     Appendectomy, Back Surgery, Bladder Surgery, Breast Surgery, CABG, Carotid     Stenosis, Cholecystectomy, Ear Surgery, Kidney Surgery, Nose Surgery,     Prostatectomy, Rectal Surgery, Spinal Surgery, Testicular Surgery, Throat     Surgery, Tonsils, Valve Replacement, Vascular Surgery      Diabetes - Family Hx:  Brother, Sister      Cancer/Type - Family Hx:  Mother (breast), Brother ( 2005 skin )      Is Father Still Living?:  No      Is Mother Still Living?:  No       Family History:  Yes      Social History:  Tobacco Use; No Alcohol Use, No Recreational Drug use      Smoking status:  Former smoker (quit  1ppd for 25 to 30- years )       Section:  No      Tubal Ligation:  Yes (partial)      Hysterectomy:  Yes (Partial)      Anticoagulation Therapy:  Yes      Antibiotic Prophylaxis:  Yes      Medical History:  Yes: Arthritis, Asthma (INHALER), Atrial Fibrillation, Chronic    Bronchitis/COPD, Congestive Heart Failu, Shortness Of Breath (COUGH), M    iscellaneous Medical/oth (pace-maker/ h/o VSD as a child s/p repair); No:     Alcoholism, Allergies, Anemia, Blood Disease, Broken Bones, Cataracts, Chemical     Dependency, Chemotherapy/Cancer, Emphysema, Chronic Liver Disease, Colon     Trouble, Colitis, Diverticulitis, Deafness or Ringing Ears, Convulsions,     Depression, Anxiety, Bipolar Disorder, PTSD, Diabetes, Epilepsy, Seizures,     Forgetfullness, Glaucoma, Gall Stones, Gout, Head Injury,  Heart Attack, Heart     Murmur, GERD, Hemorrhoids/Rectal Prob, Hepatitis, Hiatal Hernia, High Blood     Pressure, High Cholesterol, HIV (Do not ask - volu, Jaundice, Kidney or Bladder     Disease, Kidney Stones, Migrane Headaches, Mitral Valve Prolapse, Night sweats,     Phlebitis, Psychiatric Care, Reflux Disease, Rheumatic Fever, Sexually Transmit    bailey Dis, Sinus Trouble, Skin Disease/Psoriais/Ecz, Stroke, Thyroid Problem,     Tuberculosis or Pos TB Te      Psychiatric History      none            PREVENTION      Hx Influenza Vaccination:  Yes      Date Influenza Vaccine Given:  Sep 1, 2019      Influenza Vaccine Declined:  No      2 or More Falls in Past Year?:  No      Fall Past Year with Injury?:  No      Hx Pneumococcal Vaccination:  Yes      Encouraged to follow-up with:  PCP regarding preventative exams.      Chart initiated by      derek peraza ma            ALLERGIES/MEDICATIONS      Allergies:        Coded Allergies:             ADENOSINE (Verified  Allergy, Severe, NAUSEA, 8/12/20)           CODEINE (Verified  Allergy, Severe, NAUSEA, 8/12/20)      Medications    Last Reconciled on 8/12/20 11:49 by NILTON COOPER,       Isavuconazonium Sulfate (Cresemba) 186 Mg Capsule      372 MG PO QDAY for 28 Days, #56 CAPSULE 2 Refills         Prov: NILTON COOPER         6/19/20       Isavuconazonium Sulfate (Cresemba) 186 Mg Capsule      372 MG PO Q8HR, #12 CAPSULE         Prov: NILTON COOPER         6/19/20       Neb-Budesonide (Pulmicort) 0.5 Mg/2 Ml Ampul.neb      0.5 MG INH BID, #60 NEB 5 Refills         Prov: TRIS[facility] CALDERÓN [facility]         2/20/20       Promethazine HCl/DM (Promethazine DM) 473 Ml Syrup      5 ML PO Q6H PRN for COUGH, #120 ML 0 Refills         Reported         2/20/20       Linaclotide (Linzess) 145 Mcg Capsule      145 MCG PO QDAY, CAP         Reported         2/20/20       Umeclidinium/Vilanterol 62.5-25 Mcg Inh (Anoro Ellipta 62.5-25 Mcg Inh) 1 Each     Blst.w.dev      1 PUFF INH QDAY for  30 Days, #3 INH 3 Refills         Prov: NILTON COOPER         2/17/20       NEB-Albuterol Sulf (Albuterol) 2.5 Mg/3 Ml Vial.neb      2.5 MG INH Q6H PRN for SHORTNESS OF BREATH/WHEEZING for 30 Days, #120 NEB 4     Refills         Prov: NILTON COOPER         10/2/19       Furosemide* (Lasix*) 40 Mg Tablet      40 MG PO QDAY, #30 TAB 0 Refills         Reported         10/30/18       Temazepam (Temazepam) 30 Mg Capsule      30 MG PO HS, CAP         Reported         1/24/18       Levothyroxine (Levothyroxine) 0.075 Mg Tablet      0.075 MG PO QDAY@07, #30 TAB 0 Refills         Reported         1/24/18       Spironolactone (Aldactone) 25 Mg Tablet      25 MG PO QDAY, #60 TAB 0 Refills         Reported         1/24/18       Rosuvastatin Calcium (Crestor*) 20 Mg Tablet      20 MG PO HS, #30 TAB 0 Refills         Reported         1/24/18       Metoprolol Tartrate (Metoprolol Tartrate) 25 Mg Tablet      25 MG PO HS, #90 TAB 0 Refills         Reported         1/24/18       Dofetilide (Tikosyn) 0.5 Mg Cap      0.5 MG PO BID, CAP         Reported         1/24/18       Apixaban (Eliquis) 5 Mg Tablet      5 MG PO BID for 30 Days, #60 TAB         Reported         1/24/18      Current Medications      Current Medications Reviewed 8/12/20            EXAM      VITAL SIGNS:  Reviewed.        GENERAL: She has cyanosis of the tip of the nose, she is on 2 liters of oxygen     satting 94% on room air       NECK:  Supple without tracheal deviation or lymphadenopathy.  No thyromegaly     appreciated.      LYMPHATICS:  No cervical or supraclavicular lymphadenopathy.      HEENT: Pupils are equal, round and reactive to light. There is no scleral     icterus.  Nares patent without hypertrophy of the turbinates. No erythema of the    passages.  TMs are clear bilaterally with good cone of light. The posterior     pharynx is without  lesions or erythema.      RESPIRATORY: Scattered rhonchi and expiratory wheezing,  No rales.  Tympanic to      percussion.        CARDIOVASCULAR:  Regular rate and rhythm.  No murmurs, gallops or rubs.  No     lower extremity edema.  Equal radial pulses.        GI: Soft, nontender, nondistended, no organomegaly.  Bowel sounds present in all    four quadrants.      MUSCULOSKELETAL:  No joint effusions, erythema or warmth over the major joint     systems.      SKIN:  No rashes or lesions.      NEUROLOGIC: Cranial nerves II-XII are intact bilaterally.  Moves all     extremities. Ambulates with ease.      PSYCH:  Appropriate mood and affect.      Vtials      Vitals:             Height 5 ft 4 in / 162.56 cm           Weight 174 lbs 1 oz / 78.672660 kg           BSA 1.84 m2           BMI 29.9 kg/m2           Temperature 98.0 F / 36.67 C - Tympanic           Pulse 56           Respirations 16           Blood Pressure 122/80 Sitting, Right Arm           Pulse Oximetry 94%, room air            REVIEW      Results Reviewed      [facility] Results Reviewed?:  Yes Prev Lab Results, Yes Prev Radiology Results, Yes     Previous [facility] Records      Lab Results      I personally reviewed my last clinic note. I reviewed microbiology specimens     from her bronchoscopy where she grew aspergillus.      Radiographic Results      I personally reviewed her CT scan of the chest from February 2020 that showed     marked pulmonary arterial hypertension with evidence of right heart strain,     stable 0.3 cm nodule in the right lower lobe and increasing ground glass opacity    that was likely atelectasis versus pneumonitis and 12 month follow up was     recommended.            Assessment      Notes      New Medications      * predniSONE (Deltasone) 10 MG TABLET: 10 MG PO ASDIR #45         Instructions: [id number],49uby1g,69dfp1g,[id number]      * Amoxicillin/Clavulanic Acid 875/125 (Augmentin 875/125) 1 EACH TABLET: 875 MG       PO BID 7 Days #14      * Formoterol Fumarate (Perforomist) 20 MCG/2 ML VIAL.NEB: 20 MCG INH BID 30 Days      #120      *  UMECLIDINIUM BROMIDE (Incruse Ellipta) 62.5 MCG BLST.W.DEV: 1 PUFF INH RTQDAY       #1      * TIOTROPIUM BROMIDE (Spiriva Respimat 2.5 mcg/Puff) 4 GM MIST.INHAL: 2 PUFFS       INH RTQDAY #1      Renewed Medications      * ISAVUCONAZONIUM SULFATE (Cresemba) 186 MG CAPSULE: 372 MG PO QDAY 28 Days #56      Discontinued Medications      * Umeclidinium/Vilanterol 62.5-25 Mcg Inh (Anoro Ellipta 62.5-25 Mcg Inh) 1 EACH      BLST.W.DEV: 1 PUFF INH QDAY 30 Days #3      ASSESSMENT:       1.  Aspergillus pneumonia on Cresemba.        2. Moderate obstructive lung disease with COPD and asthma overlap syndrome with     recurrent exacerbations.      3.  Subpleural scarring.      4. Restrictive lung disease based on pulmonary function test.       5. Nocturnal hypoxemia on supplemental oxygen nightly.      6. History of heart failure, pulmonary hypertension, ASD followed by Dr. Kelsey and Dr. Lucero. She is on several anti-rhythmics.       7. Severe tricuspid valve regurgitation.      8.  Former tobacco abuse with cigarettes in remission.      9. Chronic cough and chronic obstructive pulmonary disease with exacerbation.             PLAN:      1. I have called in a steroid taper and Augmentin 875 mg PO twice daily for 7     days for her acute exacerbation of chronic obstructive pulmonary disease/asthma.          2. In regards to her aspergillus, she needs another 3 months for a total of 6     months treatment. I spoke with our nurse navigator Nancy to try to get this     extended.       3. She is currently using budesonide once daily and anoro. I have changed this     to budesonide and Perforomist twice daily and incruse once daily. We taught her     how to appropriately use these and we were able to give her some samples.       4. She needs to come see me in 2 months. She is quite ill at baseline with all     these infections and I think she has some allergic hypersensitivity to the hay     and environmental factors. I will  send a letter to her  stating he needs     to be away from her when he is working in moldy hay.            Patient Education      Education resources provided:  Yes      Patient Education Provided:  Acute Asthma, Acute Bronchitis, COPD, How to use an    Inhaler, Pulmonary Hypertension      Time Spent:  > 50% /[facility]            Electronically signed by NILTON COOPER  08/23/2020 21:22       Disclaimer: Converted document may not contain table formatting or lab diagrams. Please see [facility] for the authenticated document.

## 2021-05-28 NOTE — PROGRESS NOTES
Patient: MARY LAMA     Acct: [id number]     Report: #[id number]  UNIT #: [id number]     : 1955    Encounter Date:10/30/2018  PRIMARY CARE: SVETA MADSEN  ***Signed***  --------------------------------------------------------------------------------------------------------------------  Chief Complaint      Encounter Date      Oct 30, 2018            Primary Care Provider      FLEX CUMMINGS            Referring Provider      SVETA MADSEN            Patient Complaint      Patient is complaining of      6 mo f/u soa            VITALS      Height 5 ft 4 in / 162.56 cm      Weight 182 lbs 8 oz / 82.515963 kg      BSA 1.88 m2      BMI 31.3 kg/m2      Temperature 98.1 F / 36.72 C - Oral      Pulse 89      Respirations 16      Blood Pressure 122/56 Sitting, Right Arm      Pulse Oximetry 98%, room air      Initial Exhaled Nitrous Oxide      Exhaled Nitrous Oxide Results:  20            HPI      The patient is a 63 year old female with a history of UIP and restrictive lung     disease, pulmonary hypertension, pulmonary fibrosis, severe tricuspid     regurgitation. She is complaining of joint pain in the wrists and specifically     the MCP joints. She is also short of breath chronically on room air however she     has a cough that is productive of yellow sputum. She wheezes more when people     are plowing the bean crops or when people are burning things outside her home.     At that time she will use a rescue inhaler and get benefit from it.            ROS      Constitutional:  Denies: Fatigue, Fever, Weight gain, Weight loss, Chills,     Insomnia, Other      Respiratory/Breathing:  Complains of: Shortness of air, Wheezing, Cough; Denies:    Hemoptysis, Pleuritic pain, Other      Endocrine:  Denies: Polydipsia, Polyuria, Heat/cold intolerance, Abnorml     menstrual pattern, Diabetes, Other      Eyes:  Denies: Blurred vision, Vision Changes, Other      Ears, nose, mouth, throat:  Denies: Mouth lesions, Thrush,  Throat pain,     Hoarseness, Allergies/Hay Fever, Post Nasal Drip, Headaches, Recent Head Injury,    Nose Bleeding, Neck Stiffness, Thyroid Mass, Hearing Loss, Ear Fullness, Dry     Mouth, Nasal or Sinus Pain, Dry Lips, Nasal discharge, Nasal congestion, Other      Cardiovascular:  Denies: Palpitations, Syncope, Claudication, Chest Pain, Wake     up Gasping for air, Leg Swelling, Irregular Heart Rate, Cyanosis, Dyspnea on     Exertion, Other      Gastrointestinal:  Denies: Nausea, Constipation, Diarrhea, Abdominal pain,     Vomiting, Difficulty Swallowing, Reflux/Heartburn, Dysphagia, Jaundice,     Bloating, Melena, Bloody stools, Other      Genitourinary:  Denies: Urinary frequency, Incontinence, Hematuria, Urgency,     Nocturia, Dysuria      Musculoskeletal:  Complains of: Joint Pain, Joint Stiffness, Joint Swelling;     Denies: Myalgias      Hematologic/lymphatic:  DENIES: Lymphadenopathy, Bruising, Bleeding tendencies,     Other      Neurological:  Denies: Headache, Numbness, Weakness, Seizures, Other      Psychiatric:  Denies: Anxiety, Appropriate Effect, Depression, Other      Sleep:  No: Excessive daytime sleep, Morning Headache?, Snoring, Insomnia?, Stop    breathing at sleep?, Other      Integumentary:  Denies: Rash, Dry skin, Skin Warm to Touch, Other      Immunologic/Allergic:  Denies: Latex allergy, Seasonal allergies, Asthma,     Urticaria, Eczema, Other      Immunization status:  No: Up to date            FAMILY/SOCIAL/MEDICAL HX      Surgical History:  Yes: Abdominal Surgery (HERNIA), Bowel Surgery (COLONOSCOPY),    Eye Surgery (cataract), Head Surgery (BILATERAL CATARACTS-), Hernia Surgery,     Oral Surgery (EGD), Orthopedic Surgery (RTH), Other Surgeries (open heart 5 to 6    heart ablations has pace maker )      Diabetes - Family Hx:  Brother, Sister      Cancer/Type - Family Hx:  Mother (breast), Brother ( 2005 skin )      Is Father Still Living?:  No      Is Mother Still Living?:  No        Family History:  Yes      Social History:  Tobacco Use; No Alcohol Use, No Recreational Drug use      Smoking status:  Former smoker (quit 2008 1ppd for 25 to 30- years )      Tubal Ligation:  Yes (partial)      Anticoagulation Therapy:  Yes      Antibiotic Prophylaxis:  Yes      Medical History:  Yes: Arthritis, Asthma (INHALER), Congestive Heart Failu,     Shortness Of Breath, Miscellaneous Medical/oth (pace-maker/ h/o VSD as a child     s/p repair); No: Blood Disease, Chemotherapy/Cancer, Deafness or Ringing Ears,     Hemorrhoids/Rectal Prob, High Blood Pressure, Sinus Trouble      Psychiatric History      none            PREVENTION      Hx Influenza Vaccination:  Yes      Date Influenza Vaccine Given:  Sep 28, 2018      Influenza Vaccine Declined:  No      2 or More Falls Past Year?:  No      Fall Past Year with Injury?:  No      Hx Pneumococcal Vaccination:  Yes      Encouraged to follow-up with:  PCP regarding preventative exams.      Chart initiated by      ashvin lin/ ma            ALLERGIES/MEDICATIONS      Allergies:        Coded Allergies:             ADENOSINE (Verified  Allergy, Severe, NAUSEA, 10/30/18)           CODEINE (Verified  Allergy, Severe, NAUSEA, 10/30/18)      Medications    Last Reconciled on 4/20/18 18:11 by NILTON COOPER DO      Furosemide* (Lasix*) 40 Mg Tablet      40 MG PO QDAY, #30 TAB 0 Refills         Reported         10/30/18       Polyethylene Glycol (Miralax*) 17 Gm Packet      17 GM PO Q2D PRN for CONSTIPATION, #15 PKT 0 Refills         Reported         6/1/18       (oxygen 2 )   No Conflict Check               Prov: NILTON COOPER         1/24/18       MDI-Albuterol (Ventolin HFA*) 18 Gm Hfa.aer.ad      2 PUFFS INH Q4-6H PRN for SHORTNESS OF BREATH, #1 MDI 0 Refills         Reported         1/24/18       Temazepam (Temazepam) 30 Mg Capsule      30 MG PO HS, CAP         Reported         1/24/18       Levothyroxine (Levothyroxine) 0.075 Mg Tablet      0.075 MG PO QDAY@07, #30  TAB 0 Refills         Reported         1/24/18       Spironolactone (Aldactone) 25 Mg Tablet      50 MG PO QDAY, #60 TAB 0 Refills         Reported         1/24/18       Rosuvastatin Calcium (Crestor*) 20 Mg Tablet      20 MG PO HS, #30 TAB 0 Refills         Reported         1/24/18       Metoprolol Tartrate (Metoprolol Tartrate*) 25 Mg Tablet      25 MG PO HS, #90 TAB 0 Refills         Reported         1/24/18       Dofetilide (Tikosyn) 0.5 Mg Cap      0.5 MG PO BID, CAP         Reported         1/24/18       Apixaban (Eliquis) 5 Mg Tablet      5 MG PO BID for 30 Days, #60 TAB         Reported         1/24/18      Current Medications      Current Medications Reviewed 10/30/18            EXAM      VITAL SIGNS:  Reviewed.        NECK:  Supple without tracheal deviation or lymphadenopathy.  No thyromegaly     appreciated.      LYMPHATICS:  No cervical or supraclavicular lymphadenopathy.      HEENT: Pupils are equal, round and reactive to light. There is no scleral     icterus.  Nares patent without hypertrophy of the turbinates. No erythema of the    passages.  TMs are clear bilaterally with good cone of light. The posterior     pharynx is without  lesions or erythema.      RESPIRATORY:  Clear to auscultation bilaterally.  No wheezes, rales or rhonchi.     Tympanic to percussion.        CARDIOVASCULAR:  Regular rate and rhythm.  No murmurs, gallops or rubs.  No     lower extremity edema.  Equal radial pulses.        GI: Soft, nontender, nondistended, no organomegaly.  Bowel sounds present in all    four quadrants.      MUSCULOSKELETAL:  No joint effusions, erythema or warmth over the major joint     systems.      SKIN:  No rashes or lesions.      NEUROLOGIC: Cranial nerves II-XII are intact bilaterally.  Moves all     extremities. Ambulates with ease.      PSYCH:  Appropriate mood and affect.      Vtials      Vitals:             Height 5 ft 4 in / 162.56 cm           Weight 182 lbs 8 oz / 82.855998 kg           BSA  1.88 m2           BMI 31.3 kg/m2           Temperature 98.1 F / 36.72 C - Oral           Pulse 89           Respirations 16           Blood Pressure 122/56 Sitting, Right Arm           Pulse Oximetry 98%, room air            REVIEW      Results Reviewed      [facility] Results Reviewed?:  Yes Prev Lab Results, Yes Prev Radiology Results, Yes     Previous [facility] Records            Assessment      UIP (usual interstitial pneumonitis) - J84.89            Notes      New Medications      * Furosemide* (Lasix*) 40 MG TABLET: 40 MG PO QDAY #30      New Diagnostics      * Cyclic Citrull Peptide, Routine      * 6 Min Walk With Pulse Ox, 4 Months         Dx: UIP (usual interstitial pneumonitis) - J84.112      * PFT-Comp, PrePost,DLCO,BodyBox, 4 Months         Dx: UIP (usual interstitial pneumonitis) - J84.112      * Chest W/O Cont High Resolution, 4 Months         Dx: UIP (usual interstitial pneumonitis) - J84.112      ASSESSMENT:      1. UIP with worsening joint pain, question rheumatoid arthritis versus os    teoarthritis.       2. Restrictive lung disease secondary to UIP.       3. Right ventricular heart failure.      4. Pulmonary hypertension WHO group 3.       5. Severe tricuspid valve regurgitation.       6. Chronic dyspnea on exertion.       7. Environmental allergens.       8. Chronic cough productive of yellow sputum.       9. Former tobacco abuse with cigarettes in remission.             PLAN:      1. Check anti-CCP antibodies.       2. She is due for pulmonary function test and CT scan of the chest without     contrast. I will get this high resolution.       3.  I will get a 6 minute walk test with oxygen titration.       4. She is now on Lasix 40 mg once daily. This has been updated in her chart.        5. If she is doing well she can go to a 1 year follow up.            Patient Education      Education resources provided:  Yes            Electronically signed by NILTON COOPER  12/12/2019 18:43       Disclaimer:  Converted document may not contain table formatting or lab diagrams. Please see [employer] [facility] for the authenticated document.

## 2021-05-28 NOTE — PROGRESS NOTES
Patient: MARY HARRISON     Acct: [id number]     Report: #[id number]  UNIT #: [id number]     : 1955    Encounter Date:2020  PRIMARY CARE: SVETA MADSEN  ***Signed***  --------------------------------------------------------------------------------------------------------------------  TELEHEALTH NOTE      History of Present Illness            Chief Complaint:  2-3 month follow up            Mary Harrison is presenting for evaluation via Telehealth visit by phone.     Verbal consent obtained before beginning visit.            Provider spent 22 minutes with the patient during telehealth visit.            The following staff were present during the visit: Macy Calvo MA, Shannan De Los Santos DO             The patient is a 64 year old female who follows with me for moderate COPD with     asthma overlap, nocturnal hypoxemia, restrictive lung disease and concern for     pulmonary hypertension.  She last saw SHANICE Alba in March after undergoing    a bronchoscopy by myself in early March.  The bronchoscopy ended up growing out     aspergillus from bronchial washings. The patient was not started on antifungal     by Jil at that time.  She continues to have a daily cough productive of yellow    thick phlegm. It is all throughout the day and night. She has daily wheezing and    shortness of breath, hoarse changed quality to her voice as well.  She has not     required any steroids in the past several months. She is getting ready to     establish with a new cardiologist to be further worked up for pulmonary hyperten    matthew. She is on Tikosyn and has congenital ASD as well as permanent atrial     fibrillation. She is chronically anticoagulated with Eliquis.  The patient is     using Anoro as well as Pulmicort nebulizer, so essentially on triple therapy for    her breathing. She uses albuterol as needed approximately twice a day.  She     denies any hemoptysis, night sweats, fevers or chills.             I have reviewed ROS, medical, surgical and family history and agree with those     as entered in the chart.  I also reviewed microbiology results for her     bronchoscopy and my bronchoscopy report.                           Past Med History      Past Med History: COPD            Former Smoker: Pt started smoking at 13 years old, 1 ppd x 40 years, Quit 2008            Flu and Pneumonia Vaccines: Current      Overview of Symptoms      Pt complains of: SOA, Wheezing, Cough            Pt Denies: fever, chills, nausea, vomiting            Most Recent Lab Findings      Laboratory Tests      5/26/20 13:47            Allergies/Medications      Allergies:        Coded Allergies:             ADENOSINE (Verified  Allergy, Severe, NAUSEA, 6/9/20)           CODEINE (Verified  Allergy, Severe, NAUSEA, 6/9/20)      Medications    Last Reconciled on 6/9/20 14:52 by NILTON COOPER,       Voriconazole (Voriconazole) 200 Mg Tablet      200 MG PO BID for 60 Days, #120 TAB         Prov: NILTON COOPER         6/9/20       Voriconazole (Voriconazole) 200 Mg Tablet      200 MG PO TID for 3 Days, #9 TAB         Prov: NILTON COOPER         6/9/20       Neb-Budesonide (Pulmicort) 0.5 Mg/2 Ml Ampul.neb      0.5 MG INH BID, #60 NEB 5 Refills         Prov: TRIS[facility] CALDERÓN [facility]         2/20/20       Promethazine HCl/DM (Promethazine DM) 473 Ml Syrup      5 ML PO Q6H PRN for COUGH, #120 ML 0 Refills         Reported         2/20/20       Linaclotide (Linzess) 145 Mcg Capsule      145 MCG PO QDAY, CAP         Reported         2/20/20       Umeclidinium/Vilanterol 62.5-25 Mcg Inh (Anoro Ellipta 62.5-25 Mcg Inh) 1 Each     Blst.w.dev      1 PUFF INH QDAY for 30 Days, #3 INH 3 Refills         Prov: NILTON COOPER         2/17/20       NEB-Albuterol Sulf (Albuterol) 2.5 Mg/3 Ml Vial.neb      2.5 MG INH Q6H PRN for SHORTNESS OF BREATH/WHEEZING for 30 Days, #120 NEB 4     Refills         Prov: NILTON COOPER         10/2/19       Furosemide* (Lasix*) 40  Mg Tablet      40 MG PO QDAY, #30 TAB 0 Refills         Reported         10/30/18       Temazepam (Temazepam) 30 Mg Capsule      30 MG PO HS, CAP         Reported         1/24/18       Levothyroxine (Levothyroxine) 0.075 Mg Tablet      0.075 MG PO QDAY@07, #30 TAB 0 Refills         Reported         1/24/18       Spironolactone (Aldactone) 25 Mg Tablet      25 MG PO QDAY, #60 TAB 0 Refills         Reported         1/24/18       Rosuvastatin Calcium (Crestor*) 20 Mg Tablet      20 MG PO HS, #30 TAB 0 Refills         Reported         1/24/18       Metoprolol Tartrate (Metoprolol Tartrate) 25 Mg Tablet      25 MG PO HS, #90 TAB 0 Refills         Reported         1/24/18       Dofetilide (Tikosyn) 0.5 Mg Cap      0.5 MG PO BID, CAP         Reported         1/24/18       Apixaban (Eliquis) 5 Mg Tablet      5 MG PO BID for 30 Days, #60 TAB         Reported         1/24/18            Plan/Instructions      Ambulatory Assessment/Plan:        Notes      New Medications      * Voriconazole 200 MG TABLET: 200 MG PO TID 3 Days #9      * Voriconazole 200 MG TABLET: 200 MG PO BID 60 Days #120      Plan/Instructions            * Plan Of Care: ()            * Chronic conditions reviewed and taken into consideration for today's treatment       plan.      * Patient instructed to seek medical attention urgently for new or worsening       symptoms.      * Patient was educated/instructed on their diagnosis, treatment and medications       prior to discharge from the clinic today.            ASSESSMENT:       1.  Aspergillus isolated from bronchoscopy.      2.  Moderate COPD with asthma overlap.      3.  Chronic cough.      4. Nocturnal hypoxemia.      5. Concern for pulmonary hypertension, establishing with new cardiologist.      6.  History of ASD.      7.  Permanent atrial fibrillation.      8. Chronic anticoagulation with Eliquis.            PLAN:      1. Start voriconazole for aspergillus isolated from bronchoscopy. She will need      a loading dose 200 mg po three times a day for three days, followed by 200 mg po     twice a day for a minimum of 12 weeks.      2.  I would like to see her back in 6-8 weeks.      Codes:  Phone Eval 21-30 mi [id number]            Electronically signed by NILTON COOPER  06/16/2020 16:25       Disclaimer: Converted document may not contain table formatting or lab diagrams. Please see [employer] for the authenticated document.

## 2021-05-28 NOTE — PROGRESS NOTES
Patient: MARY LAMA     Acct: [id number]     Report: #[id number]  UNIT #: [id number]     : 1955    Encounter Date:2021  PRIMARY CARE: SVETA MADSEN  ***Signed***  --------------------------------------------------------------------------------------------------------------------  Chief Complaint      Encounter Date      2021            Primary Care Provider      SVETA MADSEN            Referring Provider      SVETA MADSEN            Patient Complaint      Patient is complaining of      Patient is here for 3 month f/u, COPD            VITALS      Height 5 ft 4 in / 162.56 cm      Weight 178 lbs  / 80.116488 kg      BSA 1.86 m2      BMI 30.6 kg/m2      Temperature 97.4 F / 36.33 C - Tympanic      Pulse 86      Respirations 18      Blood Pressure 118/67 Sitting, Right Arm      Pulse Oximetry 92%, room air            HPI      The patient is a very pleasant 65 year old  female here for follow up.     She has been doing well without issues.  She has finished six months of     cresemba. Dyspnea is at baseline.  Dyspnea is worse with walking about 1500     feet, mild in severity, worse with exertion and relieved with rest. She denies     any coughing, wheezing, headaches, chest pain, weight loss or hemoptysis.      Denies any nausea, vomiting, fevers or chills.  She is otherwise is doing well.     She is able to perform her ADLs without difficulty. Denies any swollen glands or    lymph nodes of the head and neck.            I have personally reviewed the review of systems, past family, social, surgical     and medical histories and I agree with the findings.            ROS      Constitutional:  Complains of: Fatigue; Denies: Fever, Weight gain, Weight loss,    Chills, Insomnia, Other      Respiratory/Breathing:  Complains of: Shortness of air, Cough; Denies: Wheezing,    Hemoptysis, Pleuritic pain, Other      Endocrine:  Denies: Polydipsia, Polyuria, Heat/cold intolerance, Abnorml      menstrual pattern, Diabetes, Other      Eyes:  Denies: Blurred vision, Vision Changes, Other      Ears, nose, mouth, throat:  Denies: Congestion, Dysphagia, Hearing Changes, Nose    Bleeding, Nasal Discharge, Throat pain, Tinnitus, Other      Cardiovascular:  Denies: Chest Pain, Exertional dyspnea, Peripheral Edema,     Palpitations, Syncope, Wake up Gasping for air, Orthopnea, Tachycardia, Other      Gastrointestinal:  Denies: Abdominal pain/cramping, Bloody stools, Constipation,    Diarrhea, Melena, Nausea, Vomiting, Other      Genitourinary:  Denies: Dysuria, Urinary frequency, Incontinence, Hematuria,     Urgency, Other      Musculoskeletal:  Denies: Joint Pain, Joint Stiffness, Joint Swelling, Myalgias,    Other      Hematologic/lymphatic:  DENIES: Lymphadenopathy, Bruising, Bleeding tendencies,     Other      Neurologic:  Denies: Headache, Numbness, Weakness, Seizures, Other      Psychiatric:  Denies: Anxiety, Appropriate Effect, Depression, Other      Sleep:  No: Excessive daytime sleep, Morning Headache?, Snoring, Insomnia?, Stop    breathing at sleep?, Other      Integumentary:  Denies: Rash, Dry skin, Skin Warm to Touch, Other            FAMILY/SOCIAL/MEDICAL HX      Surgical History:  Yes: Abdominal Surgery (HERNIA), Bowel Surgery (COLONOSCOPY),    Eye Surgery (cataract), Head Surgery (BILATERAL CATARACTS-), Hernia Surgery,     Oral Surgery (EGD), Orthopedic Surgery (RTH), Other Surgeries (open heart 5 to 6    heart ablations has pace maker ); No: AAA Repair, Adenoids, Angioplasty,     Appendectomy, Back Surgery, Bladder Surgery, Breast Surgery, CABG, Carotid     Stenosis, Cholecystectomy, Ear Surgery, Kidney Surgery, Nose Surgery,     Prostatectomy, Rectal Surgery, Spinal Surgery, Testicular Surgery, Throat     Surgery, Tonsils, Valve Replacement, Vascular Surgery      Diabetes - Family Hx:  Brother, Sister      Cancer/Type - Family Hx:  Mother, Brother      Social History:  No Tobacco Use, No Alcohol  Use, No Recreational Drug use      Smoking status:  Former smoker ((quit  1ppd for 25 to 30- years ))       Section:  No      Tubal Ligation:  Yes (partial)      Hysterectomy:  Yes (Partial)      Anticoagulation Therapy:  Yes      Antibiotic Prophylaxis:  No      Medical History:  Yes: Arthritis, Asthma (INHALER), Chronic Bronchitis/COPD,     Congestive Heart Failu, Shortness Of Breath (COUGH), Miscellaneous Medical/oth     (pace-maker/ h/o VSD as a child s/p repair); No: Anemia, Blood Disease, Broken     Bones, Cataracts, Chemical Dependency, Chemotherapy/Cancer, Emphysema, Chronic     Liver Disease, Colon Trouble, Colitis, Diverticulitis, Deafness or Ringing Ears,    Depression, Anxiety, Bipolar Disorder, PTSD, Diabetes, Epilepsy, Seizures,     Glaucoma, Gall Stones, Gout, Head Injury, Heart Attack, Heart Murmur, GERD,     Hemorrhoids/Rectal Prob, Hepatitis, Hiatal Hernia, High Blood Pressure, HIV (Do     not ask - volu, Kidney or Bladder Disease, Kidney Stones, Migrane Headaches,     Mitral Valve Prolapse, Psychiatric Care, Reflux Disease, Rheumatic Fever,     Sexually Transmitted Dis, Sinus Trouble, Skin Disease/Psoriais/Ecz, Stroke,     Thyroid Problem, Tuberculosis or Pos TB Te      Psychiatric History      none            PREVENTION      Hx Influenza Vaccination:  Yes      Date Influenza Vaccine Given:  Sep 1, 2020      Influenza Vaccine Declined:  No      2 or More Falls in Past Year?:  No      Fall Past Year with Injury?:  No      Hx Pneumococcal Vaccination:  Yes      Encouraged to follow-up with:  PCP regarding preventative exams.      Chart initiated by      Shannan Serrano CMA            ALLERGIES/MEDICATIONS      Allergies:        Coded Allergies:             ADENOSINE (Verified  Allergy, Severe, NAUSEA, 21)           CODEINE (Verified  Allergy, Severe, NAUSEA, 21)      Medications    Last Reconciled on 21 16:00 by WAGNER QUESADA MD      Neb-Budesonide (Pulmicort) 0.5 Mg/2 Ml  Ampul.neb      0.5 MG INH BID for 28 Days, #60 NEB 5 Refills         Prov: [facility]         11/10/20       Tiotropium Bromide (Spiriva Respimat 2.5 mcg/Puff) 4 Gm Mist.inhal      2 PUFFS INH RTQDAY, #1 MDI 3 Refills         [facility]         10/29/20       Arformoterol Tartrate (Brovana) 15 Mcg/2 Ml Vial.neb      15 MCG INH RTBID, #60 NEB 3 Refills         Prov: [facility]IS[facility]         10/14/20       Isavuconazonium Sulfate (Cresemba) 186 Mg Capsule      372 MG PO QDAY for 28 Days, #56 CAPSULE 2 Refills         [facility]         10/14/20       Promethazine DM 6.25-15MG/5 ML (Promethazine DM 6.25-15MG/5 ML) 473 Ml Syrup      5 ML PO Q6H PRN for COUGH, #120 ML 0 Refills         Reported         2/20/20       Linaclotide (Linzess) 145 Mcg Capsule      145 MCG PO QDAY, CAP         Reported         2/20/20       NEB-Albuterol Sulf (Albuterol) 2.5 Mg/3 Ml Vial.neb      2.5 MG INH Q6H PRN for SHORTNESS OF BREATH/WHEEZING for 30 Days, #120 NEB 4     Refills         Prov: NILTON COOPER         10/2/19       Furosemide* (Lasix*) 40 Mg Tablet      40 MG PO QDAY, #30 TAB 0 Refills         Reported         10/30/18       Temazepam (Temazepam) 30 Mg Capsule      30 MG PO HS, CAP         Reported         1/24/18       Levothyroxine (Levothyroxine) 0.075 Mg Tablet      0.075 MG PO QDAY@07, #30 TAB 0 Refills         Reported         1/24/18       Spironolactone (Aldactone) 25 Mg Tablet      25 MG PO QDAY, #60 TAB 0 Refills         Reported         1/24/18       Rosuvastatin Calcium (Crestor*) 20 Mg Tablet      20 MG PO HS, #30 TAB 0 Refills         Reported         1/24/18       Metoprolol Tartrate (Metoprolol Tartrate) 25 Mg Tablet      25 MG PO HS, #90 TAB 0 Refills         Reported         1/24/18       Dofetilide (Tikosyn) 0.5 Mg Cap      0.5 MG PO BID, CAP         Reported         1/24/18       Apixaban (Eliquis) 5 Mg Tablet      5 MG PO BID for 30 Days, #60 TAB          Reported         1/24/18      Current Medications      Current Medications Reviewed 1/20/21            EXAM      Vital Signs Reviewed.      General:  WDWN, Alert, NAD.      HEENT: PERRL, EOMI.  OP, nares clear, no sinus tenderness.      Chest: Good aeration, clear to auscultation bilaterally, tympanic to percussion     bilaterally, no work of breathing noted.      CV: RRR, no MGR, pulses 2+, equal.        Abd: Soft, NT, ND, +BS, no HSM.      EXT: No clubbing, no cyanosis, no edema.       Neuro:  A  Skin: No rashes or lesions.      Vitals      Vitals:             Height 5 ft 4 in / 162.56 cm           Weight 178 lbs  / 80.838382 kg           BSA 1.86 m2           BMI 30.6 kg/m2           Temperature 97.4 F / 36.33 C - Tympanic           Pulse 86           Respirations 18           Blood Pressure 118/67 Sitting, Right Arm           Pulse Oximetry 92%, room air            REVIEW      Results Reviewed      PCCS Results Reviewed?:  Yes Prev Lab Results, Yes Prev Radiology Results, Yes     Previous [facility] Records      Lab Results      I reviewed Merari Owens last office visit note.            Assessment      IMPRESSION:      1.  Aspergillus pneumonia, resolved.      2.  COPD without exacerbation, has asthma overlap syndrome of moderate severity.     On triple inhaler therapy with gold stage C COPD.      3.  Nocturnal hypoxemia on nighttime two liters of oxygen.      4.  History of congenital heart disease with atrioseptal defect and pulmonary     hypertension, congenital.      5. Atrial fibrillation, well-controlled.      6 Chronic dyspnea at baseline.       7. Tobacco abuse with cigarettes in remission.              PLAN:      1.  DC cresemba as it has been six months.      2. Continue triple inhaler and nebulizer therapy.      3. Continue albuterol as needed.      4. Cardiac medications per cardiology.      5. Up-to-date with flu, Prevnar and Pneumovax.      6. Follow up with us in 6-12 months.             Electronically signed by WAGNER QUESADA  01/25/2021 05:39       Disclaimer: Converted document may not contain table formatting or lab diagrams. Please see [email or web address] for the authenticated document.

## 2021-05-28 NOTE — PROGRESS NOTES
Patient: MARY LAMA     Acct: [id number]     Report: #[id number]  UNIT #: [id number]     : 1955    Encounter Date:2019  PRIMARY CARE: SVETA MADSEN  ***Signed***  --------------------------------------------------------------------------------------------------------------------  Chief Complaint      Encounter Date      2019            Primary Care Provider      FLEX CUMMINGS            Referring Provider      SVETA MADSEN            Patient Complaint      Patient is complaining of      Pt here for 4 month follow up/COPD            VITALS      Height 5 ft 4 in / 162.56 cm      Weight 180 lbs 9 oz / 81.709095 kg      BSA 1.87 m2      BMI 31.0 kg/m2      Temperature 97.8 F / 36.56 C - Oral      Pulse 100      Respirations 16      Blood Pressure 120/81 Sitting, Right Arm      Pulse Oximetry 95%, room air      Initial Exhaled Nitrous Oxide      Exhaled Nitrous Oxide Results:  20            HPI      The patient is a 64 year old former tobacco smoker, she smoked 1 pack per day     for 25-30 years and quit in . She has has history of chronic obstructive     pulmonary disease and asthma overlap syndrome and is compliant with Stiolto 2     puffs inhaled once daily. She does state that her insurance company has not been    paying for the Stiolto and she has been getting samples from her primary care     provider. She follows with cardiology in regards to congestive heart failure     with several arrhythmias status post pacemaker. She is on Tikosyn and she has wh    at I presume what is pulmonary arterial hypertension based on severely dilated     pulmonary artery on CT imaging and she thinks she underwent a right     catheterization but has not followed up with her cardiologist since she last saw    me. She denies any syncopal episodes, she does have intermittent palpitations.     She continues to be dyspneic with strenuous activity or climbing stairs but as     long as she takes her time  she has no difficulty. She does wear oxygen at night.    She has a nonproductive cough, denies wheezing. She has had no recent chronic     obstructive pulmonary disease exacerbations, asthma exacerbations or     hospitalizations.             Review of Systems as noted.            Past medical, family, social and surgical history reviewed and I agree with that    as documented in the medical chart.            Medications were reviewed and updated in the chart.            ROS      Constitutional:  Denies: Fatigue, Fever, Weight gain, Weight loss, Chills,     Insomnia, Other      Respiratory/Breathing:  Complains of: Shortness of air, Cough; Denies: Wheezing,    Hemoptysis, Pleuritic pain, Other      Endocrine:  Denies: Polydipsia, Polyuria, Heat/cold intolerance, Abnorml     menstrual pattern, Diabetes, Other      Eyes:  Denies: Blurred vision, Vision Changes, Other      Ears, nose, mouth, throat:  Denies: Congestion, Dysphagia, Hearing Changes, Nose    Bleeding, Nasal Discharge, Throat pain, Tinnitus, Other      Cardiovascular:  Denies: Chest Pain, Exertional dyspnea, Peripheral Edema,     Palpitations, Syncope, Wake up Gasping for air, Orthopnea, Tachycardia, Other      Gastrointestinal:  Denies: Abdominal pain/cramping, Bloody stools, Constipation,    Diarrhea, Melena, Nausea, Vomiting, Other      Genitourinary:  Denies: Dysuria, Urinary frequency, Incontinence, Hematuria,     Urgency, Other      Musculoskeletal:  Denies: Joint Pain, Joint Stiffness, Joint Swelling, Myalgias,    Other      Hematologic/lymphatic:  DENIES: Lymphadenopathy, Bruising, Bleeding tendencies,     Other      Neurologic:  Denies: Headache, Numbness, Weakness, Seizures, Other      Psychiatric:  Denies: Anxiety, Appropriate Effect, Depression, Other      Sleep:  No: Excessive daytime sleep, Morning Headache?, Snoring, Insomnia?, Stop    breathing at sleep?, Other      Integumentary:  Denies: Rash, Dry skin, Skin Warm to Touch, Other             FAMILY/SOCIAL/MEDICAL HX      Surgical History:  Yes: Abdominal Surgery (HERNIA), Bowel Surgery (COLONOSCOPY),    Eye Surgery (cataract), Head Surgery (BILATERAL CATARACTS-), Hernia Surgery,     Oral Surgery (EGD), Orthopedic Surgery (RTH), Other Surgeries (open heart 5 to 6    heart ablations has pace maker )      Diabetes - Family Hx:  Brother, Sister      Cancer/Type - Family Hx:  Mother (breast), Brother ( 2005 skin )      Is Father Still Living?:  No      Is Mother Still Living?:  No      Social History:  Tobacco Use; No Alcohol Use, No Recreational Drug use      Smoking status:  Former smoker (quit ; 1ppd for 25 to 30- years )       Section:  No      Tubal Ligation:  Yes      Hysterectomy:  Yes (Partial)      Anticoagulation Therapy:  Yes      Antibiotic Prophylaxis:  Yes      Medical History:  Yes: Arthritis, Asthma (INHALER), Atrial Fibrillation, Chronic    Bronchitis/COPD, Congestive Heart Failu, Shortness Of Breath, Miscellaneous     Medical/oth (pace-maker/ h/o VSD as a child s/p repair); No: Blood Disease,     Chemotherapy/Cancer, Deafness or Ringing Ears, Hemorrhoids/Rectal Prob, High     Blood Pressure, Sinus Trouble      Psychiatric History      None            PREVENTION      Hx Influenza Vaccination:  Yes      Date Influenza Vaccine Given:  Oct 1, 2018      Influenza Vaccine Declined:  No      2 or More Falls Past Year?:  No      Fall Past Year with Injury?:  No      Hx Pneumococcal Vaccination:  Yes      Encouraged to follow-up with:  PCP regarding preventative exams.      Chart initiated by      Macy Calvo MA            ALLERGIES/MEDICATIONS      Allergies:        Coded Allergies:             ADENOSINE (Verified  Allergy, Severe, NAUSEA, 19)           CODEINE (Verified  Allergy, Severe, NAUSEA, 19)      Medications    Last Reconciled on 19 14:13 by NILTON COOPER, DO      Tiotropium Br/Olodaterol HCl (Stiolto Respimat Inhal Spray) 4 Gm Mist.inhal      2 PUFFS INH  RTQDAY, #1 INH 3 Refills         Reported         6/13/19       Furosemide* (Lasix*) 40 Mg Tablet      40 MG PO QDAY, #30 TAB 0 Refills         Reported         10/30/18       Polyethylene Glycol (Miralax*) 17 Gm Packet      17 GM PO Q2D PRN for CONSTIPATION, #15 PKT 0 Refills         Reported         6/1/18       (oxygen 2 )   No Conflict Check               Prov: NILTON COOPER         1/24/18       MDI-Albuterol (Ventolin HFA) 18 Gm Hfa.aer.ad      2 PUFFS INH Q4-6H PRN for SHORTNESS OF BREATH, #1 MDI 0 Refills         Reported         1/24/18       Temazepam (Temazepam) 30 Mg Capsule      30 MG PO HS, CAP         Reported         1/24/18       Levothyroxine (Levothyroxine) 0.075 Mg Tablet      0.075 MG PO QDAY@07, #30 TAB 0 Refills         Reported         1/24/18       Spironolactone (Aldactone) 25 Mg Tablet      50 MG PO QDAY, #60 TAB 0 Refills         Reported         1/24/18       Rosuvastatin Calcium (Crestor*) 20 Mg Tablet      20 MG PO HS, #30 TAB 0 Refills         Reported         1/24/18       Metoprolol Tartrate (Metoprolol Tartrate) 25 Mg Tablet      25 MG PO HS, #90 TAB 0 Refills         Reported         1/24/18       Dofetilide (Tikosyn) 0.5 Mg Cap      0.5 MG PO BID, CAP         Reported         1/24/18       Apixaban (Eliquis) 5 Mg Tablet      5 MG PO BID for 30 Days, #60 TAB         Reported         1/24/18      Current Medications      Current Medications Reviewed 6/13/19            EXAM      VITAL SIGNS:  Reviewed.        NECK:  Supple without tracheal deviation or lymphadenopathy.  No thyromegaly     appreciated.      LYMPHATICS:  No cervical or supraclavicular lymphadenopathy.      HEENT: Pupils are equal, round and reactive to light. There is no scleral     icterus.  Nares patent without hypertrophy of the turbinates. No erythema of the    passages.  TMs are clear bilaterally with good cone of light. The posterior     pharynx is without  lesions or erythema.      RESPIRATORY:  Clear to  auscultation bilaterally.  No wheezes, rales or rhonchi.     Tympanic to percussion.        CARDIOVASCULAR:  Regular rate. There is a grade 3/6 mid systolic murmur at all     listening posts, irregular rhythm, displaced PMI inferiorly and laterally.  No     gallops or rubs.  No lower extremity edema.  Equal radial pulses.        GI: Soft, nontender, nondistended, no organomegaly.  Bowel sounds present in all    four quadrants.      MUSCULOSKELETAL:  No joint effusions, erythema or warmth over the major joint sy    stems.      SKIN:  No rashes or lesions.      NEUROLOGIC: Cranial nerves II-XII are intact bilaterally.  Moves all     extremities. Ambulates with ease.      PSYCH:  Appropriate mood and affect.      Vitals      Vitals:             Height 5 ft 4 in / 162.56 cm           Weight 180 lbs 9 oz / 81.010428 kg           BSA 1.87 m2           BMI 31.0 kg/m2           Temperature 97.8 F / 36.56 C - Oral           Pulse 100           Respirations 16           Blood Pressure 120/81 Sitting, Right Arm           Pulse Oximetry 95%, room air            REVIEW      Results Reviewed      PCCS Results Reviewed?:  Yes Prev Lab Results, Yes Prev Radiology Results, Yes     Previous [facility] Records (I personally reviewed the patient's notes from her     rheumatologist Dr. BAIRON Hester for work up of positive anti-CCP antibodies. She     did not feel she had rheumatoid arthritis based on this and will have her follow    up as needed. )      Lab Results      I personally reviewed the patient's right and left heart catheterizations     performed October 2018. She had pulmonary artery pressure of 48/22. I also     reviewed 6 minute walk test and pulmonary function test as well as overnight     oximetry.      Radiographic Results      I personally reviewed CT scan of the chest performed in February 2019.            Assessment      Former smoker - Z87.891            Emphysema of lung - J43.9            Notes      New Medications      *  Umeclidinium/Vilanterol 62.5-25 Mcg Inh (Anoro Ellipta 62.5-25 Mcg Inh) 1 EACH      BLST.W.DEV: 1 PUFF INH QDAY #1      New Diagnostics      * Low Dose Chest CT, 9 Months         Dx: Former smoker - Z87.891      * Inhaler Education, Routine         Dx: Emphysema of lung - J43.9      ASSESSMENT:      1.Moderate obstructive lung disease with chronic obstructive pulmonary disease     and asthma overlap.       2.History of restrictive lung disease based on pulmonary function tests.       3. Nocturnal hypoxemia compliant with supplemental oxygen.       4. Pulmonary hypertension ? WHO group followed by cardiology group in     [place].       5. Severe tricuspid valve regurgitation.       7. Atrial fibrillation/A-flutter on several anti-arrhythmics       8. Chronic anticoagulation with eliquis.       9. Former tobacco abuse with cigarettes in remission.             PLAN:      1. I have personally discussed the risks and benefits of low dose CT scan of the    chest for lung cancer screening.  The risks were discussed with the patient     today including false positives, possible invasive procedures, future follow up     CT scans and radiation exposure. I have ordered this for approximately 9 months     from now. This will be 1 year from her prior CT scan of the chest.       2. I will stop Stiolto given that it is not affordable for her and change her to    anoro 1 puff daily. We taught her how to appropriately use this in the office     today.       3. The patient does not seem to having a flare of asthma at all, she does not     have any nighttime awakenings or wheezing. There is no indication at this time     for inhaled steroids.        4. Follow up in 4 months in my [place] clinic to see how she is doing on     anoro and see if her cardiologist has any further insight into her pulmonary     arterial hypertension.            Patient Education      Education resources provided:  Yes      Patient Education Provided:   COPD, How to use an Inhaler, Lung Cancer      Time Spent:  > 50% /[facility]                 Disclaimer: Converted document may not contain table formatting or lab diagrams. Please see [employer] [facility] for the authenticated document.

## 2021-05-28 NOTE — PROGRESS NOTES
Patient: MARY LAMA     Acct: [id number]     Report: #[id number]  UNIT #: [id number]     : 1955    Encounter Date:10/02/2019  PRIMARY CARE: SVETA MADSEN  ***Signed***  --------------------------------------------------------------------------------------------------------------------  Chief Complaint      Encounter Date      Oct 2, 2019            Primary Care Provider      SVETA MADSEN            Referring Provider      SVETA MADSEN            Patient Complaint      Patient is complaining of      Patient here today for 4 month follow up            VITALS      Height 5 ft 4 in / 162.56 cm      Weight 180 lbs 4 oz / 81.66565 kg      BSA 1.87 m2      BMI 30.9 kg/m2      Temperature 97.9 F / 36.61 C - Temporal      Pulse 49      Respirations 14      Blood Pressure 102/60 Sitting, Left Arm      Pulse Oximetry 91%, Room air      Comment: Uses oxygen at night.      Initial Exhaled Nitrous Oxide      Exhaled Nitrous Oxide Results:  20            HPI      The patient is a 64 year old who presents today for 4 month follow up. She was     last seen in  for routine visit for chronic obstructive pulmonary disease.     She has significant history for congestive heart failure and arrhythmias status     post pacemaker. She is on  Tikosyn, apixiban, Lasix and spironolactone. She is     followed by Dr. Kavin Juarez in [place] for her heart conditions. She     states these are stable and she has seen him since her last visit with me and     was reassured that she does not have any concerning findings for pulmonary     arterial hypertension, which I asked her to discuss with her cardiologist based     on her severely dilated pulmonary artery noted on CT imaging and not having had     a right heart catheterization to her knowledge.             At her last office visit I stopped Stiolto and changed her to anoro once daily.     She had some trouble getting this approved through her insurance to pay for it.     It  "is still expensive but she is able to buy it for about $45. She is using it     once a day and thinks it helps her have more \"pep in her step.\"  She can do more    and breath better with this although by the evening she is beginning to feel     short of breath.  She contributes her shortness of breath to increased dust in     the environment, [place] lot of    dust from the fields. She does have nocturnal hypoxia and wears 2 liters of     oxygen at night every night. She suffers from a daily cough productive of pale     yellow, sometimes white phlegm, mostly in the mornings. She denies any blood in     her sputum, no recent chronic obstructive pulmonary disease exacerbations. She     did get steroids once since she last saw me but this was for back pain. She has     trouble sleeping at night and takes Restoril which helps her sleep for a    pproximately 4-6 hours.             Review of Systems is positive for fatigue, chronic dyspnea on exertion,     intermittent wheeze and chronic cough as described above. A full 14 point Review    of Systems was asked and detailed in the chart.             Past medical, family, social and surgical history reviewed and I agree with that    as documented in the medical chart. There are no significant changes since she     was last seen  in the office.             Medications were reviewed and updated in the chart.            ROS      Constitutional:  Complains of: Fatigue; Denies: Fever, Weight gain, Weight loss,    Chills, Insomnia, Other      Respiratory/Breathing:  Complains of: Shortness of air, Wheezing, Cough; Denies:    Hemoptysis, Pleuritic pain      Endocrine:  Denies: Polydipsia, Polyuria, Heat/cold intolerance, Abnorml     menstrual pattern, Diabetes, Other      Eyes:  Denies: Blurred vision, Vision Changes, Other      Ears, nose, mouth, throat:  Denies: Congestion, Dysphagia, Hearing Changes, Nose    Bleeding, Nasal " Discharge, Throat pain, Tinnitus, Other      Cardiovascular:  Denies: Chest Pain, Exertional dyspnea, Peripheral Edema,     Palpitations, Syncope, Wake up Gasping for air, Orthopnea      Gastrointestinal:  Denies: Abdominal pain/cramping, Bloody stools, Constipation,    Diarrhea, Melena, Nausea, Vomiting      Genitourinary:  Denies: Dysuria, Urinary frequency, Incontinence, Hematuria,     Urgency      Musculoskeletal:  Complains of: Other; Denies: Joint Pain, Joint Stiffness,     Joint Swelling, Myalgias      Hematologic/lymphatic:  DENIES: Lymphadenopathy, Bruising, Bleeding tendencies      Neurologic:  Denies: Headache, Numbness, Weakness, Seizures      Psychiatric:  Complains of: Other; Denies: Anxiety, Appropriate Effect,     Depression      Sleep:  Yes: Insomnia?; No: Excessive daytime sleep, Morning Headache?, Snoring,    Stop breathing at sleep?      Integumentary:  Denies: Rash, Dry skin            FAMILY/SOCIAL/MEDICAL HX      Surgical History:  Yes: Abdominal Surgery (HERNIA), Bowel Surgery (COLONOSCOPY),    Eye Surgery (cataract), Head Surgery (BILATERAL CATARACTS-), Hernia Surgery,     Oral Surgery (EGD), Orthopedic Surgery (RTH), Other Surgeries (open heart 5 to 6    heart ablations has pace maker )      Diabetes - Family Hx:  Brother, Sister      Cancer/Type - Family Hx:  Mother (breast), Brother ( 2005 skin )      Is Father Still Living?:  No      Is Mother Still Living?:  No      Social History:  Tobacco Use; No Alcohol Use, No Recreational Drug use      Smoking status:  Former smoker (quit ; 1ppd for 25 to 30- years )       Section:  No      Tubal Ligation:  Yes      Hysterectomy:  Yes (Partial)      Anticoagulation Therapy:  Yes      Antibiotic Prophylaxis:  Yes      Medical History:  Yes: Arthritis, Asthma (INHALER), Atrial Fibrillation, Chronic    Bronchitis/COPD, Congestive Heart Failu, Shortness Of Breath, Miscellaneous     Medical/oth (pace-maker/ h/o VSD as a child s/p  repair); No: Blood Disease,     Chemotherapy/Cancer, Deafness or Ringing Ears, Hemorrhoids/Rectal Prob, High     Blood Pressure, Sinus Trouble      Psychiatric History      none            PREVENTION      Hx Influenza Vaccination:  Yes      Date Influenza Vaccine Given:  Oct 1, 2019      Influenza Vaccine Declined:  No      2 or More Falls Past Year?:  No      Fall Past Year with Injury?:  No      Hx Pneumococcal Vaccination:  Yes      Encouraged to follow-up with:  PCP regarding preventative exams.      Chart initiated by      Hermila Bliss MA            ALLERGIES/MEDICATIONS      Allergies:        Coded Allergies:             ADENOSINE (Verified  Allergy, Severe, NAUSEA, 10/2/19)           CODEINE (Verified  Allergy, Severe, NAUSEA, 10/2/19)      Medications    Last Reconciled on 10/2/19 09:12 by NILTON COOPER, DO      Umeclidinium/Vilanterol 62.5-25 Mcg Inh (Anoro Ellipta 62.5-25 Mcg Inh) 1 Each     Blst.w.dev      1 PUFF INH QDAY for 30 Days, #1 INH 3 Refills         Prov: NILTON COOPER         6/28/19       Furosemide* (Lasix*) 40 Mg Tablet      40 MG PO QDAY, #30 TAB 0 Refills         Reported         10/30/18       Polyethylene Glycol (Miralax*) 17 Gm Packet      17 GM PO Q2D PRN for CONSTIPATION, #15 PKT 0 Refills         Reported         6/1/18       (oxygen 2 )   No Conflict Check               Prov: NILTON COOPER         1/24/18       MDI-Albuterol (Ventolin HFA) 18 Gm Hfa.aer.ad      2 PUFFS INH Q4-6H PRN for SHORTNESS OF BREATH, #1 MDI 0 Refills         Reported         1/24/18       Temazepam (Temazepam) 30 Mg Capsule      30 MG PO HS, CAP         Reported         1/24/18       Levothyroxine (Levothyroxine) 0.075 Mg Tablet      0.075 MG PO QDAY@07, #30 TAB 0 Refills         Reported         1/24/18       Spironolactone (Aldactone) 25 Mg Tablet      50 MG PO QDAY, #60 TAB 0 Refills         Reported         1/24/18       Rosuvastatin Calcium (Crestor*) 20 Mg Tablet      20 MG PO HS, #30 TAB 0 Refills          Reported         1/24/18       Metoprolol Tartrate (Metoprolol Tartrate) 25 Mg Tablet      25 MG PO HS, #90 TAB 0 Refills         Reported         1/24/18       Dofetilide (Tikosyn) 0.5 Mg Cap      0.5 MG PO BID, CAP         Reported         1/24/18       Apixaban (Eliquis) 5 Mg Tablet      5 MG PO BID for 30 Days, #60 TAB         Reported         1/24/18      Current Medications      Current Medications Reviewed 10/2/19            EXAM      VITAL SIGNS:  Reviewed.        GENERAL: The patient appears stated age in no acute distress.        NECK:  Supple without tracheal deviation or lymphadenopathy.  No thyromegaly     appreciated.      LYMPHATICS:  No cervical or supraclavicular lymphadenopathy.      HEENT: Pupils are equal, round and reactive to light. There is no scleral     icterus.  Nares patent without hypertrophy of the turbinates. No erythema of the    passages. The posterior pharynx is without  lesions or erythema. There is a     crater type appearance on the tip of the nose from a prior skin cancer removal.       RESPIRATORY:  Clear to auscultation bilaterally.  No wheezes, rales or rhonchi.     Tympanic to percussion.        CARDIOVASCULAR: Irregularly irregular rhythm with displaced PMI. Distant heart     tones noted.  No murmurs, gallops or rubs.  No lower extremity edema.  Equal rad    ial pulses.        GI: Soft, nontender, nondistended, no organomegaly.  Bowel sounds present in all    four quadrants.      MUSCULOSKELETAL:  No joint effusions, erythema or warmth over the major joint     systems.      SKIN:  No rashes or lesions.      NEUROLOGIC: Cranial nerves II-XII are intact bilaterally.  Moves all     extremities. Ambulates with ease.      PSYCH:  Appropriate mood and affect.      Vitals      Vitals:             Height 5 ft 4 in / 162.56 cm           Weight 180 lbs 4 oz / 81.38875 kg           BSA 1.87 m2           BMI 30.9 kg/m2           Temperature 97.9 F / 36.61 C - Temporal           Pulse  49           Respirations 14           Blood Pressure 102/60 Sitting, Left Arm           Pulse Oximetry 91%, Room air           Comment: Uses oxygen at night.            REVIEW      Results Reviewed      PCCS Results Reviewed?:  Yes Prev Radiology Results, Yes Previous [facility] Records      Lab Results      I personally reviewed the patient's 6 minute walk test performed in February 2019.  She walked a total of 1100 feet and desaturated to 86% at the 3 minute     sia and required supplementation of 2 liters of oxygen at that time. Oxygen     saturation on 2 liters of oxygen at 6 minutes was 94%.      Radiographic Results      I personally reviewed CT scan of the chest performed 02/01/19 showing subpleural    interstitial prominence at the lung bases likely secondary to chronic scarring.     A few tiny nodules were unchanged. Cardiomegaly and dilated pulmonary artery was    noted.      PFT Results      I personally reviewed pulmonary function test performed in February 2019 showing    both obstructive and restrictive lung disease noted with a FEV1 of 56%     predicted. Total lung capacity 65% predicted, DLCO 40% predicted.            Assessment      COPD (chronic obstructive pulmonary disease) with chronic bronchitis - J44.9            Notes      New Medications      * NEB-Albuterol Sulf (Albuterol) 2.5 MG/3 ML VIAL.NEB: 2.5 MG INH Q6H PRN       SHORTNESS OF BREATH/WHEEZING 30 Days #120         Instructions: Submit to Medicare B if applicable. Call for Diagnosis if        needed.         Dx: COPD (chronic obstructive pulmonary disease) with chronic bronchitis -        J44.9      Discontinued Medications      * Umeclidinium/Vilanterol 62.5-25 Mcg Inh (Anoro Ellipta 62.5-25 Mcg Inh) 1 EACH      BLST.W.DEV: 1 PUFF INH QDAY #1      * Umeclidinium/Vilanterol 62.5-25 Mcg Inh (Anoro Ellipta 62.5-25 Mcg Inh) 1 EACH      BLST.W.DEV          Sample - Qty 4         Instructions: 1 puff QD         Dx: Chronic obstructive  pulmonary disease - J44.9      New Office Procedures      * Provide Nebulizer Education, Routine         Dx: COPD (chronic obstructive pulmonary disease) with chronic bronchitis -        J44.9      ASSESSMENT:      1.Moderate obstructive lung disease with chronic obstructive pulmonary disease     and asthma overlap without acute exacerbation.       2. Restrictive lung disease based on pulmonary function tests.       3. Subpleural scarring.       4. Nocturnal hypoxemia on supplemental oxygen.       5.  Permanent atrial fibrillation status post pacemaker placement on several     anti-arrhythmics.       6. Chronic anticoagulation with eliquis.       7. Severe tricuspid valve regurgitation.       8. Concern for pulmonary hypertension followed by cardiologist in [place].       9. Former tobacco abuse with cigarettes in remission.       10.  Hypoxemia with exertion.       11. Insomnia.              PLAN:      1. continue anoro 1 puff daily. She feels like she runs out benefit from this     inhaler in the evening so I will give her a nebulized medication in the form of     albuterol. We gave her nebulizer machine today and showed her how to     appropriately use it. I will call this into her pharmacy and she can use it     every 6 hours for shortness of breath.       2. I discussed with the patient possibly using oxygen during the day and she     says she is used to her oxygen being in the low 90s and does not feel she would     benefit from having supplemental oxygen throughout the day. She will continue to    wear it at night.       3. I counseled the patient on sleep hygiene. She is on chronic     temazepam/Restoril at night to help her sleep from another provider. Continue     with this for now.  Consider  sleep study in the near future.       4. She will be due for repeat CT scan of the chest 1 year from prior which will     be due in February 2020. She will return to see me after that has been     performed.        5. Follow up with Dr. Juarez regarding her chronic cardiac condition and pul    monary hypertension?      6. She has had her flu vaccine this season and is up to date on pneumonia     vaccines to her knowledge.            Patient Education      Education resources provided:  Yes      Patient Education Provided:  COPD, How to use a Nebulizer      Other Education:  chronic hypoxemia-importance of oxygen                 Disclaimer: Converted document may not contain table formatting or lab diagrams. Please see [employer] for the authenticated document.
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
## 2021-06-04 NOTE — TELEPHONE ENCOUNTER
I spoke with pt and went over results from echo and reasons for investigating her mitral valve further.  She understands all results and is willing to proceed with DELIA, understands procedure.    ----- Message from Manuel Tavarez MD sent at 3/4/2018  9:30 PM EST -----  Needs DELIA ASAP. She does not know results of TTE (MR).    
pt c/o abdominal pain, mom states pt was screaming at home, red jon noted to right side of abdomen.

## 2021-06-05 NOTE — PROGRESS NOTES
Progress Note      Patient Name: Deb Harrison   Patient ID: [id number]   Sex: Female   YOB: 1955        Visit Date: May 26, 2021    Provider: NIRU Arthur   Location: [facility] [place]   Location Address: [address] [place] [address]   MICHAEL oWlff  [id number]   Location Phone: [phone]          Chief Complaint     PATIENT IS HERE FOR MEDICATION REFILLS AND FASTING LAB WORK.    SHE IS ALSO STILL HAVING TROUBLE/PAIN WITH HER LEFT HIP/LEG AREA.       History Of Present Illness  Deb Harrison is a 65 year old /White female who presents for evaluation and treatment of:      pt here for the medication refills today and fasting labs:     --insomnia: pt on the restoril and does well no hang over feeling the next morning and no daytime sleepiness and no aberrant behaviors no S/S of misuse nor abuse--and deirdre appropriate--and urine tox screen (as expected) and pt was originally placed on this per cardiologist and they cont'd this for yrs and then told her the PCP would need to take over --pt T&F multiple other meds and nothing worked or would interfere with her heart meds      --cholesterol: stable on the meds no SE     --chronic constipation--on linzess does good with this--no SE    --hypothyroidism: stable on this     --sees pulmonary for the COPD/asthma/ and interstitial lung Dz     _____________________________________________________________________________________    and still having the hip/lower back pain--and per CT scan of Lumbar spine= (+) stenosis and bulging discs and pt with the radicular s/s--pt tried the baclofen and no help and did the steroids but too soon to do that again and unable to take any NSAIDs or codeine --did do tramadol in the past       Past Medical History  Disease Name Date Onset Notes   Anticoagulant long-term use --  --    CAD (coronary artery disease) --  --    Cardiomegaly --  --    CHF (congestive heart failure) --  --     Constipation --  --    COVID-19 virus infection --  --    DDD (degenerative disc disease), lumbar --  --    Emphysema of lung --  --    Hypothyroid --  --    Insomnia --  --    Interstitial lung disease --  --    Lumbar stenosis --  --    Neck injury --  --    Osteoarthritis --  --    Oxygen dependent --  --    Pulmonary arterial hypertension --  --    SVT (supraventricular tachycardia) --  --          Past Surgical History  Procedure Name Date Notes   heart surgery --  --    Hernia Repair --  --    Hip Replacement --  --    Hysterectomy --  --          Medication List  Name Date Started Instructions   Brovana 15 mcg/2 mL inhalation solution for nebulization  inhale 2 milliliters (15 mcg) by inhalation route 2 times per day   budesonide 0.5 mg/2 mL inhalation suspension for nebulization  inhale 2 milliliters (0.5 mg) by nebulization route once daily   Eliquis 5 mg oral tablet  --    furosemide 40 mg oral tablet  --    ipratropium-albuterol 0.5 mg-3 mg(2.5 mg base)/3 mL inhalation solution for nebulization  inhale 3 milliliters by nebulization route 4 times per day and as needed, up to 6 doses per day   levothyroxine 75 mcg oral tablet 05/26/2021 TAKE 1 TABLET DAILY   Linzess 145 mcg oral capsule 05/26/2021 take 1 capsule (145 mcg) by oral route once daily on an empty stomach at least 30 minutes before 1st meal of the day for 30 days   metoprolol tartrate 25 mg oral tablet  --    oxygen 2 L/NC per NC  pt wears this at night   rosuvastatin 20 mg oral tablet 05/26/2021 TAKE 1 TABLET AT BEDTIME   Spiriva Respimat 2.5 mcg/actuation inhalation mist  inhale 2 puffs (5 mcg) by inhalation route once daily at the same time each day   spironolactone 50 mg oral tablet  --    temazepam 30 mg oral capsule 05/26/2021 TAKE ONE CAPSULE BY MOUTH EACH NIGHT AT BEDTIME AS NEEDED FOR INSOMNIA   Tikosyn 500 mcg oral capsule  take 1 capsule (0.5 mg) by oral route 2 times per day   Vitamin D3 125 mcg (5,000 unit) oral tablet  --           Allergy List  Allergen Name Date Reaction Notes   Codiene --  --  --    Tylenol PM --  --  --          Family Medical History  Disease Name Relative/Age Notes   Chronic Obstructive Pulmonary Disease  --    Diabetes  --          Social History  Finding Status Start/Stop Quantity Notes    --  --/-- --  --    Tobacco Former --/53 --  QUIT 10 YEARS A JESSICA, 2008         Immunizations  NameDate Admin Mfg Trade Name Lot Number Route Inj VIS Given VIS Publication   HepA05/01/2018 SKB HAVRIX-ADULT  IM  05/01/2018 05/22/2017   Comments: NDC [id number]   Xxrrzhmcl40/01/2020 PMC FLUZONE-HIGH DOSE [id number] IM LD 10/01/2020    Comments: ndc [id number]   Fkztkkcnp8700/24/2018 MSD PNEUMOVAX 23 [id number] IM  09/24/2018 04/24/2015   Comments: NDC [id number]   Tdap05/01/2018 PMC ADACEL [id number] IM  05/01/2018 02/24/2015   Comments: NDC [id number]         Review of Systems  · Constitutional  o Admits  o : fatigue, reviewed and unchanged  o Denies  o : fever  · HENT  o Denies  o : vertigo, lightheadedness  · Cardiovascular  o Admits  o : dyspnea on exertion, additional cardiovascular symptoms except as noted in the HPI, reviewed and unchanged, shortness of breath while walking or lying flat  o Denies  o : chest pain, irregular heart beats  · Respiratory  o Admits  o : shortness of breath, additional respiratory symptoms except as noted in the HPI, asthma or wheezing, COPD  · Gastrointestinal  o Admits  o : constipation  o Denies  o : nausea, vomiting, abdominal pain, blood in stools  · Genitourinary  o Denies  o : dysuria  · Integument  o Denies  o : rash, new skin lesions  · Neurologic  o Admits  o : tingling or numbness, reviewed and unchanged  o Denies  o : altered mental status, seizures, tremors  · Musculoskeletal  o Admits  o : back pain, hip pain, additional musculoskeletal symptoms except as noted in the HPI  o Denies  o : joint pain  · Endocrine  o Denies  o : cold intolerance, heat  "intolerance  · Psychiatric  o Admits  o : difficulty sleeping, reviewed and unchanged  o Denies  o : anxiety, depression, suicidal ideation, homicidal ideation  · Heme-Lymph  o Denies  o : petechiae, lymph node enlargement or tenderness  · Allergic-Immunologic  o Denies  o : frequent illnesses      Vitals  Date Time BP Position Site L\R Cuff Size HR RR TEMP (F) WT  HT  BMI kg/m2 BSA m2 O2 Sat FR L/min FiO2 HC       05/26/2021 11:05 /72 Sitting    91 - R  97.9 179lbs 1oz 5'  3\" 31.72 1.9 94 %            Physical Examination  · Constitutional  o Appearance  o : well developed, well-nourished, in no acute distress  · Head and Face  o HEENT  o : Unremarkable--wearing mask   · Eyes  o Conjunctivae  o : conjunctivae normal  · Neck  o Inspection/Palpation  o : supple  o Thyroid  o : no thyromegaly  · Respiratory  o Respiratory Effort  o : breathing unlabored  o Auscultation of Lungs  o : clear to ascultation  · Cardiovascular  o Heart  o :   § Auscultation of Heart  § : regular rate and rhythm--some slight irregularity and loud murmur  o Peripheral Vascular System  o :   § Extremities  § : no edema  · Gastrointestinal  o Abdominal Examination  o :   § Abdomen  § : soft--nontender  · Lymphatic  o Neck  o : no lymphadenopathy present  · Musculoskeletal  o General  o :   § General Musculoskeletal  § : (+) PTTP of the Lspine and into the left SI joint--pt twisting and turning in the seat and R/T pain   · Skin and Subcutaneous Tissue  o General Inspection  o : skin turgor normal, texture normal  · Neurologic  o Gait and Station  o :   § Gait Screening  § : normal gait  · Psychiatric  o Mood and Affect  o : mood normal, affect appropriate          Results  · Historical Results  05/26/2020 Amphet Ur Ql Scn NEGATIVE NA      05/27/2020 4:31 PM (Admin): Amphetamine Minimal Detection level for this method is    05/27/2020 4:31 PM (Admin): 500 ng/ml.   05/26/2020 Barbiturates Tested Ur Scn Nom NEGATIVE NA      05/27/2020 4:31 PM " (Admin): Barbiturate Minimal Detection level for this method is    05/27/2020 4:31 PM (Admin): 200 ng/ml.   05/26/2020 Benzodiaz Ur Ql Scn NEGATIVE NA      05/27/2020 4:31 PM (Admin): Benzodiazepine Minimal Detection level for this method is    05/27/2020 4:31 PM (Admin): 100 ng/ml.   05/26/2020 Methadone Ur Ql Scn NEGATIVE NA      05/27/2020 4:31 PM (Admin): Methadone Minimal detection level for this method is 300 ng/ml.   05/26/2020 Opiates Tested Ur Scn Nom NEGATIVE NA      05/27/2020 4:31 PM (Admin): Opiate Minimal detection level for this method is 300 ng/ml.   05/26/2020 Oxycodone Ur Ql Scn NEGATIVE NA      05/27/2020 4:31 PM (Admin): Oxycodone Minimal Detection level for this method is    05/27/2020 4:31 PM (Admin): 100 ng/ml.    05/27/2020 4:31 PM (Admin): Dictated By: I/AUT,   05/26/2020 PCP Ur Ql NEGATIVE NA      05/27/2020 4:31 PM (Admin): PCP Minimal detection level for this method is 25 ng/ml.   05/26/2020 THC Ur Ql NEGATIVE NA      05/27/2020 4:31 PM (Admin): THC Minimal detection level for this method is 50 ng/ml.         Assessment  · CHF (congestive heart failure)     428.0/I50.9  · Hyperlipidemia     272.4/E78.5  · Hypothyroidism     244.9/E03.9  · Insomnia, unspecified     780.52/G47.00  · Lumbago     724.2/M54.5  · DDD (degenerative disc disease), lumbosacral     722.52/M51.37  · Lumbar radiculopathy     724.4/M54.16  · SVT (supraventricular tachycardia)     427.89/I47.1  · Murmur, cardiac     785.2/R01.1  · COPD with asthma     493.20/J44.9  · Interstitial lung disease     515/J84.9  · CAD (coronary artery disease)     414.00/I25.10      Plan  · Orders  o HTN/Lipid Panel (CMP, Lipid) [facility] ([id number], [id number]) - 272.4/E78.5 - 05/26/2021  o Thyroid Profile (THYII) - 244.9/E03.9 - 05/26/2021  o ACO-39: Current medications updated and reviewed (1159F, ) - - 05/26/2021  · Medications  o tramadol 50 mg oral tablet   SIG: take 1 tablet by oral route 2 times a day as needed for 14 days pain   DISP: (28)  Tablet with 0 refills  Prescribed on 05/26/2021     o Amitiza 8 mcg oral capsule   SIG: take 1 capsule (8 mcg) by oral route 2 times per day with food and water for 90 days   DISP: (180) Capsule with 1 refills  Prescribed on 05/26/2021     o levothyroxine 75 mcg oral tablet   SIG: TAKE 1 TABLET DAILY   DISP: (90) Tablet with 1 refills  Adjusted on 05/26/2021     o rosuvastatin 20 mg oral tablet   SIG: TAKE 1 TABLET AT BEDTIME   DISP: (90) Tablet with 1 refills  Adjusted on 05/26/2021     o temazepam 30 mg oral capsule   SIG: TAKE ONE CAPSULE BY MOUTH EACH NIGHT AT BEDTIME AS NEEDED FOR INSOMNIA   DISP: (30) Capsule with 2 refills  Adjusted on 05/26/2021     o baclofen 10 mg oral tablet   SIG: take 1 tablet by oral route 3 times a day as needed for 30 days LBP   DISP: (60) Tablet with 0 refills  Discontinued on 05/26/2021     o Linzess 145 mcg oral capsule   SIG: take 1 capsule (145 mcg) by oral route once daily on an empty stomach at least 30 minutes before 1st meal of the day for 30 days   DISP: (90) Capsule with 1 refills  Discontinued on 05/26/2021     o Medications have been Reconciled  o Transition of Care or Provider Policy  · Instructions  o Recommended exercise program to assist with cholesterol, weight loss and overall health improvement.  o Advised that cheeses and other sources of dairy fats, animal fats, fast food, and the extras (candy, pasteries, pies, doughnuts and cookies) all contain LDL raising nutrients. Advised to increase fruits, vegetables, whole grains, and to monitor portion sizes.   o Avoid any electronic use for at least 30 minutes prior to bed time. Cell phone screens, tablets and TVs immitate daylight, so your brain can become confused on the time of day. No caffeine use in the late afternoon and evenings.  o Obtained a written consent for SOLITARIO query. Discussed the risk and benefits of the use of controlled substances with the patient, including the risk of tolerance and drug  dependence. The patient has been counseled on the need to have an exit strategy, including potentially discontinuing the use of controlled substances. SOLITARIO has or will be reviewed as soon as it becomes avaliable.  o Take all medications as prescribed/directed.  o Patient was educated/instructed on their diagnosis, treatment and medications prior to discharge from the clinic today.  o Patient instructed to seek medical attention urgently for new or worsening symptoms.  o Call the office with any concerns or questions.  o Risks, benefits, and alternatives were discussed with the patient. The patient is aware of risks associated with: med mgt  o Chronic conditions reviewed and taken into consideration for today's treatment plan.  · Disposition  o Call or Return if symptoms worsen or persist.  o Return Visit Request in/on 3 months +/- 2 days ([id number]).            Electronically Signed by: NIRU Arthur -Author on May 26, 2021 01:40:29 PM

## 2021-06-08 PROBLEM — I27.21 PULMONARY ARTERIAL HYPERTENSION (HCC): Status: ACTIVE | Noted: 2021-01-01

## 2021-06-08 PROBLEM — M48.061 LUMBAR STENOSIS: Status: ACTIVE | Noted: 2021-01-01

## 2021-06-08 PROBLEM — M25.531 WRIST PAIN, ACUTE, RIGHT: Status: ACTIVE | Noted: 2021-01-01

## 2021-06-08 PROBLEM — E83.52 HYPERCALCEMIA: Status: ACTIVE | Noted: 2021-01-01

## 2021-06-08 PROBLEM — M51.36 DDD (DEGENERATIVE DISC DISEASE), LUMBAR: Status: ACTIVE | Noted: 2021-01-01

## 2021-06-08 NOTE — PROGRESS NOTES
Chief Complaint  Hand Pain (Right) and Wrist Injury (Right)    Subjective        XR Wrist 3+ View Right (In Office) (06/08/2021 10:05)    Deb Harrison presents to [facility]  Pt reports she fell 4 days ago. Legs gave out when walking to the mailbox. Broke her fall by wrapping right arm around the post jerking from wrist to neck. Discomfort has been persistently worse in wrist since then.  Tried Tylenol and wore wrist support with no relief. No swelling or discoloration. Is able to move ok but with pain.     She reports she had received correspondence from RODRIGO Bain about repeating a recent lab due to abnormaility. She would like to do that today.   Chart reviewed. RODRIGO Bain had placed orders.     Past Medical History:   Diagnosis Date   • Arrhythmia    • Arthritis     fingers, arm,hands   • CHF (congestive heart failure) (CMS/HCC)    • Dermatitis     arm and leg -topical cream BID    • Disease of thyroid gland     hypothyroidism-recently started on meds 2/25/17   • Dyspnea    • History of atrial flutter    • History of transfusion     with PFO at age 6   • Hyperlipidemia    • On home oxygen therapy     2 liters at night    • Pacemaker at end of battery life     medtronic    • Patent foramen ovale     surgery at 6 years old    • Presence of dental prosthetic device     implants    • Wears glasses        Allergies   Allergen Reactions   • Tylenol Pm Extra Strength [Diphenhydramine-Acetaminophen] Unknown - High Severity   • Adenosine Nausea Only   • Codeine Nausea Only and Dizziness        Past Surgical History:   Procedure Laterality Date   • CARDIAC CATHETERIZATION Bilateral 8/29/2018    Procedure: Right and Left Heart Cath;  Surgeon: Mark Carrasco IV, MD;  Location:  [facility] CATH INVASIVE LOCATION;  Service: Cardiovascular   • CARDIAC ELECTROPHYSIOLOGY PROCEDURE N/A 3/2/2017    Procedure: PPM generator change - dual;  Surgeon: Manuel Tavarez MD;  Location:  [facility]  EP INVASIVE LOCATION;  Service:    • CATARACT EXTRACTION     • COLONOSCOPY     • EP STUDY      ablation -several times for atrial flutter/atrial fib    • HERNIA REPAIR     • HYSTERECTOMY     • JOINT REPLACEMENT      right hip replacement    • PACEMAKER IMPLANTATION     • PATENT FORAMEN OVALE CLOSURE      age 6        Social History     Socioeconomic History   • Marital status:      Spouse name: Not on file   • Number of children: Not on file   • Years of education: Not on file   • Highest education level: Not on file   Tobacco Use   • Smoking status: Former Smoker     Packs/day: 1.00     Years: 30.00     Pack years: 30.00     Types: Cigarettes     Quit date:      Years since quittin.4   • Smokeless tobacco: Never Used   • Tobacco comment: quit in    Substance and Sexual Activity   • Alcohol use: No   • Drug use: No   • Sexual activity: Defer       Family History   Problem Relation Age of Onset   • No Known Problems Mother    • No Known Problems Father         Health Maintenance Due   Topic Date Due   • ANNUAL WELLNESS VISIT  Never done   • PAP SMEAR  Never done   • Pneumococcal Vaccine 65+ (1 of 1 - PPSV23) Never done   • ZOSTER VACCINE (2 of 2) 2019   • DXA SCAN  01/15/2021        Last Completed Pap Smear     This patient has no relevant Health Maintenance data.          Last Completed Mammogram          MAMMOGRAM (Every 2 Years) Next due on 6/10/2022    06/10/2020  Mammo Screening Bilateral With CAD    2020  Outside Procedure: INACTIVE -HC MAMMOGRAM SCREENING BILAT DIGITAL W CAD    2019  Mammo Screening Bilateral With CAD    2019  Outside Claim: HC MAMMOGRAM SCREENING BILAT DIGITAL W CAD    2018  Mammo Screening Bilateral With CAD    Only the first 5 history entries have been loaded, but more history exists.                Last Completed Colonoscopy     This patient has no relevant Health Maintenance data.          Current Outpatient Medications on File Prior to  Visit   Medication Sig   • apixaban (Eliquis) 5 MG tablet tablet    • baclofen (LIORESAL) 10 MG tablet Take 10 mg by mouth 3 (Three) Times a Day As Needed.   • budesonide (PULMICORT) 0.5 MG/2ML nebulizer solution 2 mL.   • Cholecalciferol (VITAMIN D3) 50 MCG (2000 UT) capsule Take 2,000 Units by mouth Daily.   • clobetasol (TEMOVATE) 0.05 % cream    • dofetilide (Tikosyn) 500 MCG capsule Tikosyn 500 mcg oral capsule take 1 capsule (0.5 mg) by oral route 2 times per day   Active   • furosemide (LASIX) 40 MG tablet furosemide 40 mg oral tablet take 1 tablet (40 mg) by oral route once daily   Active   • isavuconazonium (Cresemba) 186 MG capsule capsule Cresemba 186 mg oral capsule take 2 capsules (372 mg) by oral route once daily   Suspended   • linaclotide (LINZESS) 145 MCG capsule capsule Take 145 mcg by mouth Every Morning Before Breakfast.   • methylPREDNISolone (MEDROL) 4 MG dose pack TAKE AS DIRECTED ON BLISTER PACK INSIDE BOX -TAKE WITH FOOD   • metoprolol tartrate (LOPRESSOR) 25 MG tablet Take 1 tablet by mouth Daily.   • montelukast (SINGULAIR) 10 MG tablet montelukast 10 mg oral tablet take 1 tablet (10 mg) by oral route once daily in the evening   Suspended   • O2 (OXYGEN) oxygen 2 L/NC per NC pt wears this at night   Active   • polyethylene glycol (MiraLax) 17 GM/SCOOP powder 17 g.   • rosuvastatin (CRESTOR) 20 MG tablet Take 20 mg by mouth Every Night.   • spironolactone (ALDACTONE) 50 MG tablet TAKE 1 TABLET BY MOUTH EVERY DAY   • temazepam (RESTORIL) 30 MG capsule Take 30 mg by mouth at night as needed for sleep.   • tiotropium bromide monohydrate (Spiriva Respimat) 2.5 MCG/ACT aerosol solution inhaler Spiriva Respimat 2.5 mcg/actuation inhalation mist inhale 2 puffs (5 mcg) by inhalation route once daily at the same time each day   Active   • traMADol (ULTRAM) 50 MG tablet TAKE 1 TABLET BY MOUTH TWICE DAILY AS NEEDED FOR PAIN for 14 days   • triamcinolone (KENALOG) 0.1 % cream      No current  "facility-administered medications on file prior to visit.         There is no immunization history on file for this patient.    [facility]  Review of Systems   Constitutional: Negative for fever.   HENT: Negative.    Eyes: Negative.    Respiratory: Negative for cough and shortness of breath.    Cardiovascular: Negative for chest pain.   Musculoskeletal:        Right wrist pain and tenderness   Skin: Negative for color change and rash.        Objective     /68 (Patient Position: Sitting)   Pulse 83   Temp 98.1 °F (36.7 °C)   Ht 160 cm (63\")   Wt 81.2 kg (179 lb)   SpO2 96%   BMI 31.71 kg/m²         Physical Exam  Vitals reviewed.   Constitutional:       Appearance: Normal appearance.   HENT:      Head: Normocephalic.      Mouth/Throat:      Mouth: Mucous membranes are dry.   Eyes:      Pupils: Pupils are equal, round, and reactive to light.   Pulmonary:      Effort: Pulmonary effort is normal.   Musculoskeletal:      Cervical back: Normal range of motion and neck supple.      Comments: RT wrist with normal ROM with mild discomfort. Tender to palpate at base of thumb and wrist. Very slight bruising noted at base of thumb. Pulse normal.    Skin:     General: Skin is warm and dry.   Neurological:      Mental Status: She is alert.           Result Review :     Progress Notes by Siri Bain APRN (05/26/2021 00:00)  Calcium, Ionized (06/08/2021 10:04)    XR Wrist 3+ View Right (In Office) (06/08/2021 10:05)                      Assessment and Plan        Diagnoses and all orders for this visit:    1. Wrist pain, acute, right (Primary)  -     XR Wrist 3+ View Right (In Office)    2. Hypercalcemia  -     Cancel: Calcium, Ionized; Future              Follow Up     No follow-ups on file.    Patient was given instructions and counseling regarding her condition or for health maintenance advice. Please see specific information pulled into the AVS if appropriate.     Wrist xray reviewed and discussed with " pt. No obvious bony abnormality. Encouraged pt to continue with wrist support, use OTC medication as needed and follow up in 2 weeks if pain persists or increases.    Old chart reviewed in [place]. RODRIGO Bain placed orders to follow up elevated calcium. She will have labs collected today.

## 2021-06-11 NOTE — PROGRESS NOTES
Please mail letter to pt stating:    Deb, the serum calcium and PTH (parathyroid hormone), and serum electrophoresis levels were all normal--BUT the thyroid was still too suppressed and the ionized calcium was elevated--and I will need to decrease the dose completely on the thyroid med to the next lower dose and then at the 3-4 month follow-up we will repeat the levels--I will send in the new Rx for the thyroid --also the magnesium and thyroid levels were normal as well

## 2021-07-06 NOTE — PROGRESS NOTES
"Chief Complaint  Back Pain (CT Lumbar at [facility])    Subjective          Deb Harrison who is a 66 y.o. year old female who presents to [facility] for evaluation of her low back pain.    Pt with c/o low back pain, starting around 6 months ago. Pain radiating into the left groin, into the leg and anterior shin. Pain is constant, 7-8/10, increases to severe with activity. Going up stairs, walking and standing seem to aggravate the pain. Pain keeps her awake at night. She was prescribed Tramadol and Baclofen, these did not help. Typically takes Tylenol for pain. Cannot take NSAIDs. She has not had any type of physical therapy. No epidural injections. She has received steroid injections. These help for about a week or so, then pain returns.    She denies any weakness in the legs. Denies any problems with bowel or bladder.     She reports falling about a month ago, when the leg went out on her.     CT Lumbar Spine in April of this year showed multilevel spondylosis with a moderate disc bulge at L4/5 causing mild to moderate canal and right foraminal stenosis, mild foraminal stenosis on the left.         Recent Interventions: Steroid injections. Transiently effective.      Review of Systems   Constitutional: Positive for fatigue.   HENT: Positive for congestion, ear pain and sinus pressure.    Respiratory: Positive for cough, shortness of breath and wheezing.    Musculoskeletal: Positive for arthralgias, back pain, gait problem, joint swelling and myalgias.   Neurological: Positive for dizziness, weakness, light-headedness and headache.   All other systems reviewed and are negative.       Objective   Vital Signs:   /79   Pulse 91   Temp 97.3 °F (36.3 °C)   Ht 160 cm (63\")   Wt 81.4 kg (179 lb 8 oz)   SpO2 96%   BMI 31.80 kg/m²       Physical Exam  Vitals reviewed.   Constitutional:       Appearance: Normal appearance.   Musculoskeletal:      Lumbar back: Tenderness " present. Positive left straight leg raise test. Negative right straight leg raise test.      Right hip: Tenderness present. Decreased range of motion.      Left hip: Tenderness present. Decreased range of motion.   Neurological:      Mental Status: She is alert and oriented to person, place, and time.      Gait: Gait is intact.      Deep Tendon Reflexes: Strength normal.      Reflex Scores:       Patellar reflexes are 0 on the right side and 0 on the left side.       Achilles reflexes are 0 on the right side and 0 on the left side.       Neurologic Exam     Mental Status   Oriented to person, place, and time.   Level of consciousness: alert    Motor Exam   Muscle bulk: normal  Overall muscle tone: normal    Strength   Strength 5/5 throughout.     Sensory Exam   Light touch normal.     Gait, Coordination, and Reflexes     Gait  Gait: normal    Reflexes   Right patellar: 0  Left patellar: 0  Right achilles: 0  Left achilles: 0       Result Review :       Data reviewed: Radiologic studies CT Lumbar Spine in April of this year showed multilevel spondylosis with a moderate disc bulge at L4/5 causing mild to moderate canal and right foraminal stenosis, mild foraminal stenosis on the left.           Assessment and Plan    Diagnoses and all orders for this visit:    1. Lumbosacral spondylolysis (Primary)  -     Ambulatory Referral to Pain Management    2. Lumbar radiculopathy  -     Ambulatory Referral to Pain Management  -     gabapentin (NEURONTIN) 300 MG capsule; Take 1 capsule by mouth 3 (Three) Times a Day.  Dispense: 90 capsule; Refill: 1    Pt with low back pain with radiculopathy. Tramadol did not work well. Will start Gabapentin with plans to titrate to 300 mg three times daily. CT Lumbar Spine shows moderate disc bulge at L4/5, causing moderate canal and right foraminal narrowing. Will refer to pain management for LESI at L4/5. Follow up in 8 weeks to evaluate improvement.       Follow Up   Return in about 8 weeks  (around 8/31/2021).  Patient was given instructions and counseling regarding her condition or for health maintenance advice.     -Gabapentin 300 mg at bedtime x3-7 days, then twice daily x3-7 days, then three times daily  -Pain Management for LESI  -Follow up in 8 weeks

## 2021-07-06 NOTE — PATIENT INSTRUCTIONS
-Gabapentin 300 mg at bedtime x3-7 days, then twice daily x3-7 days, then three times daily  -Pain Management for LESI  -Follow up in 8 weeks

## 2021-07-26 NOTE — TELEPHONE ENCOUNTER
Gabapentin increased to 600 mg three times daily. I have placed a new referral to Pain management for lumbar epidural and medication management.

## 2021-07-26 NOTE — TELEPHONE ENCOUNTER
"Patient has not been contacted by pain management at all. As far as an increase in medication she stated \"Whatever Radha thinks will be most beneficial to my health is fine with me\".   "

## 2021-07-26 NOTE — TELEPHONE ENCOUNTER
We could consider increasing her Gabapentin as she is on a low dose. Has she been scheduled with pain management?

## 2021-08-25 PROBLEM — G47.09 OTHER INSOMNIA: Status: ACTIVE | Noted: 2021-01-01

## 2021-08-25 NOTE — PROGRESS NOTES
The ABCs of the Annual Wellness Visit  Subsequent Medicare Wellness Visit    Chief Complaint   Patient presents with   • Medicare Wellness-subsequent     yearly [facility] wellness       Subjective   History of Present Illness:  Deb Harrison is a 66 y.o. female who presents for a Subsequent Medicare Wellness Visit.    HEALTH RISK ASSESSMENT    Recent Hospitalizations:  Recently treated at the following:  [facility]     Current Medical Providers:  Patient Care Team:  Siri Bain APRN as PCP - General (Nurse Practitioner)  Shannan De Los Santos DO (Pulmonary Disease)    Smoking Status:  Social History     Tobacco Use   Smoking Status Former Smoker   • Packs/day: 1.00   • Years: 30.00   • Pack years: 30.00   • Types: Cigarettes   • Quit date:    • Years since quittin.6   Smokeless Tobacco Never Used   Tobacco Comment    quit in        Alcohol Consumption:  Social History     Substance and Sexual Activity   Alcohol Use No       Depression Screen:   PHQ-2/PHQ-9 Depression Screening 2021   Little interest or pleasure in doing things 1   Feeling down, depressed, or hopeless 1   Trouble falling or staying asleep, or sleeping too much 1   Feeling tired or having little energy 0   Poor appetite or overeating 0   Feeling bad about yourself - or that you are a failure or have let yourself or your family down 0   Trouble concentrating on things, such as reading the newspaper or watching television 0   Moving or speaking so slowly that other people could have noticed. Or the opposite - being so fidgety or restless that you have been moving around a lot more than usual 0   Thoughts that you would be better off dead, or of hurting yourself in some way 0   Total Score 3   If you checked off any problems, how difficult have these problems made it for you to do your work, take care of things at home, or get along with other people? Not difficult at all       Fall Risk Screen:  SHIRA Jacinto  Risk Assessment was completed, and patient is at HIGH risk for falls. Assessment completed on:8/25/2021    Health Habits and Functional and Cognitive Screening:  Functional & Cognitive Status 8/25/2021   Do you have difficulty preparing food and eating? No   Do you have difficulty bathing yourself, getting dressed or grooming yourself? No   Do you have difficulty using the toilet? No   Do you have difficulty moving around from place to place? No   Do you have trouble with steps or getting out of a bed or a chair? No   Current Diet Well Balanced Diet   Dental Exam Up to date   Eye Exam Up to date   Exercise (times per week) 3 times per week   Current Exercises Include Walking;Yard Work;Gardening;Home Exercise Program (TV, Computer, Etc.);House Cleaning   Do you need help using the phone?  No   Are you deaf or do you have serious difficulty hearing?  No   Do you need help with transportation? No   Do you need help shopping? No   Do you need help preparing meals?  No   Do you need help with housework?  No   Do you need help with laundry? No   Do you need help taking your medications? No   Do you need help managing money? No   Do you ever drive or ride in a car without wearing a seat belt? No   Have you felt unusual stress, anger or loneliness in the last month? No   Who do you live with? Spouse   If you need help, do you have trouble finding someone available to you? No   Have you been bothered in the last four weeks by sexual problems? No         Does the patient have evidence of cognitive impairment? No    Asprin use counseling:Does not need ASA (and currently is not on it)    Age-appropriate Screening Schedule:  Refer to the list below for future screening recommendations based on patient's age, sex and/or medical conditions. Orders for these recommended tests are listed in the plan section. The patient has been provided with a written plan.    Health Maintenance   Topic Date Due   • PAP SMEAR  Never done   • ZOSTER  VACCINE (2 of 2) 02/08/2019   • DXA SCAN  01/15/2021   • INFLUENZA VACCINE  10/01/2021   • LIPID PANEL  05/26/2022   • MAMMOGRAM  06/10/2022   • TDAP/TD VACCINES (2 - Td or Tdap) 05/01/2028          The following portions of the patient's history were reviewed and updated as appropriate: allergies, current medications, past family history, past medical history, past social history, past surgical history and problem list.    Outpatient Medications Prior to Visit   Medication Sig Dispense Refill   • apixaban (Eliquis) 5 MG tablet tablet      • budesonide (PULMICORT) 0.5 MG/2ML nebulizer solution 2 mL.     • Cholecalciferol (VITAMIN D3) 50 MCG (2000 UT) capsule Take 2,000 Units by mouth Daily.     • dofetilide (Tikosyn) 500 MCG capsule Tikosyn 500 mcg oral capsule take 1 capsule (0.5 mg) by oral route 2 times per day   Active     • furosemide (LASIX) 40 MG tablet TAKE 1 TABLET DAILY 90 tablet 1   • gabapentin (NEURONTIN) 600 MG tablet Take 1 tablet by mouth 3 (Three) Times a Day. 90 tablet 1   • levothyroxine (SYNTHROID, LEVOTHROID) 50 MCG tablet Take 1 tab PO daily on empty stomach 90 tablet 0   • metoprolol tartrate (LOPRESSOR) 25 MG tablet Take 1 tablet by mouth Daily. 90 tablet 4   • O2 (OXYGEN) oxygen 2 L/NC per NC pt wears this at night   Active     • rosuvastatin (CRESTOR) 20 MG tablet TAKE 1 TABLET AT BEDTIME 90 tablet 3   • spironolactone (ALDACTONE) 50 MG tablet TAKE 1 TABLET BY MOUTH EVERY DAY 90 tablet 3   • tiotropium bromide monohydrate (Spiriva Respimat) 2.5 MCG/ACT aerosol solution inhaler Spiriva Respimat 2.5 mcg/actuation inhalation mist inhale 2 puffs (5 mcg) by inhalation route once daily at the same time each day   Active     • triamcinolone (KENALOG) 0.1 % cream      • temazepam (RESTORIL) 30 MG capsule Take 30 mg by mouth at night as needed for sleep.     • baclofen (LIORESAL) 10 MG tablet Take 10 mg by mouth 3 (Three) Times a Day As Needed.     • clobetasol (TEMOVATE) 0.05 % cream      •  isavuconazonium (Cresemba) 186 MG capsule capsule Cresemba 186 mg oral capsule take 2 capsules (372 mg) by oral route once daily   Suspended     • linaclotide (LINZESS) 145 MCG capsule capsule Take 145 mcg by mouth Every Morning Before Breakfast.     • methylPREDNISolone (MEDROL) 4 MG dose pack TAKE AS DIRECTED ON BLISTER PACK INSIDE BOX -TAKE WITH FOOD     • montelukast (SINGULAIR) 10 MG tablet montelukast 10 mg oral tablet take 1 tablet (10 mg) by oral route once daily in the evening   Suspended     • polyethylene glycol (MiraLax) 17 GM/SCOOP powder 17 g.     • traMADol (ULTRAM) 50 MG tablet TAKE 1 TABLET BY MOUTH TWICE DAILY AS NEEDED FOR PAIN for 14 days       No facility-administered medications prior to visit.       Patient Active Problem List   Diagnosis   • Atrial tachycardia (CMS/HCC)   • Atrial flutter (CMS/HCC)   • Valvular heart disease   • ASD (atrial septal defect)   • AV block   • Pulmonary hypertension (CMS/HCC)   • Interstitial lung disease (CMS/HCC)   • Wrist pain, acute, right   • DDD (degenerative disc disease), lumbar   • Lumbar stenosis   • Pulmonary arterial hypertension (CMS/HCC)   • Hypercalcemia   • Other insomnia       Advanced Care Planning:  ACP discussion was held with the patient during this visit. Patient has an advance directive (not in EMR), copy requested.    Review of Systems   Constitutional: Negative for activity change, appetite change, chills, diaphoresis, fatigue, fever and unexpected weight change.   HENT: Positive for congestion. Negative for dental problem, drooling, ear discharge, ear pain, facial swelling, hearing loss, mouth sores, nosebleeds, postnasal drip, rhinorrhea, sinus pressure, sinus pain, sneezing, sore throat, tinnitus, trouble swallowing and voice change.    Eyes: Negative for photophobia, pain, discharge, redness, itching and visual disturbance.   Respiratory: Positive for apnea, cough, shortness of breath and wheezing. Negative for choking, chest tightness  "and stridor.         Chronic and not new and pt using the flutter valve and then on meds and sees pulmonary --pt was on the CPAP for yrs and then couldn't breath and then pt is going to D/W lung specialist --was told to just stay on O2--PRN    Cardiovascular: Negative for chest pain, palpitations and leg swelling.   Gastrointestinal: Positive for constipation. Negative for abdominal distention, abdominal pain, anal bleeding, blood in stool, diarrhea, nausea, rectal pain and vomiting.   Endocrine: Positive for heat intolerance. Negative for cold intolerance, polydipsia, polyphagia and polyuria.   Genitourinary: Negative.    Musculoskeletal: Positive for arthralgias, back pain and myalgias.        Back and hip and pt under pain mgt care and they did epidurals and lasted approx 16 days--and F/U 31st this month    Skin: Negative.    Allergic/Immunologic: Positive for environmental allergies. Negative for food allergies and immunocompromised state.   Neurological: Negative.    Hematological: Negative for adenopathy. Bruises/bleeds easily.   Psychiatric/Behavioral: Positive for sleep disturbance. Negative for agitation, behavioral problems, confusion, decreased concentration, dysphoric mood, hallucinations, self-injury and suicidal ideas. The patient is not nervous/anxious and is not hyperactive.        Compared to one year ago, the patient feels her physical health is better.  Compared to one year ago, the patient feels her mental health is better.    Reviewed chart for potential of high risk medication in the elderly: yes  Reviewed chart for potential of harmful drug interactions in the elderly:yes    Objective         Vitals:    08/25/21 1100   BP: 132/80   BP Location: Left arm   Patient Position: Sitting   Cuff Size: Adult   Pulse: 91   Temp: 97.6 °F (36.4 °C)   TempSrc: Temporal   SpO2: 94%   Weight: 81.2 kg (179 lb)   Height: 158.8 cm (62.5\")   PainSc:   4       Body mass index is 32.22 kg/m².  Discussed the " patient's BMI with her. The BMI is above average; BMI management plan is completed.    Physical Exam  Vitals and nursing note reviewed.   Constitutional:       Appearance: Normal appearance.   HENT:      Head: Normocephalic.      Right Ear: External ear normal.      Left Ear: External ear normal.      Nose: Nose normal.      Mouth/Throat:      Comments: Wearing mask   Eyes:      Pupils: Pupils are equal, round, and reactive to light.   Cardiovascular:      Rate and Rhythm: Normal rate and regular rhythm.      Heart sounds: Murmur heard.     Pulmonary:      Effort: Pulmonary effort is normal.      Breath sounds: Normal breath sounds.      Comments: SOBOE  Abdominal:      General: Bowel sounds are normal.      Palpations: Abdomen is soft.   Musculoskeletal:      Cervical back: Normal range of motion and neck supple.      Lumbar back: Spasms, tenderness and bony tenderness present. Decreased range of motion.   Skin:     General: Skin is warm and dry.   Neurological:      Mental Status: She is alert and oriented to person, place, and time.   Psychiatric:         Mood and Affect: Mood normal.         Behavior: Behavior normal.         Judgment: Judgment normal.               Assessment/Plan   Medicare Risks and Personalized Health Plan  CMS Preventative Services Quick Reference  Advance Directive Discussion  Chronic Pain   Fall Risk  Obesity/Overweight   Pt with only 1 fall in the past 1 yr and hurt her right hand--    The above risks/problems have been discussed with the patient.  Pertinent information has been shared with the patient in the After Visit Summary.  Follow up plans and orders are seen below in the Assessment/Plan Section.    Diagnoses and all orders for this visit:    1. Medicare annual wellness visit, subsequent (Primary)    2. Other insomnia  -     temazepam (RESTORIL) 30 MG capsule; Take 1 capsule by mouth At Night As Needed for Sleep.  Dispense: 30 capsule; Refill: 2    3. Constipation, unspecified  constipation type    4. Vaccine counseling  -     Zoster Vac Recomb Adjuvanted 50 MCG/0.5ML reconstituted suspension; Inject 0.5 mL into the appropriate muscle as directed by prescriber As Needed (once now then repeat in 2 months).  Dispense: 1 each; Refill: 1    Other orders  -     Plecanatide (Trulance) 3 MG tablet; Take 1 tablet by mouth Daily.  Dispense: 90 tablet; Refill: 1      Follow Up:  Return in about 1 year (around 8/25/2022), or if symptoms worsen or fail to improve.     An After Visit Summary and PPPS were given to the patient.

## 2021-08-31 PROBLEM — Z87.74 HISTORY OF REPAIR OF CONGENITAL ATRIAL SEPTAL DEFECT (ASD): Status: ACTIVE | Noted: 2021-01-01

## 2021-08-31 PROBLEM — J84.9 INTERSTITIAL LUNG DISEASE: Status: RESOLVED | Noted: 2018-08-28 | Resolved: 2021-01-01

## 2021-08-31 PROBLEM — I27.21 PULMONARY ARTERIAL HYPERTENSION: Status: RESOLVED | Noted: 2021-01-01 | Resolved: 2021-01-01

## 2021-08-31 PROBLEM — G47.33 OSA (OBSTRUCTIVE SLEEP APNEA): Status: ACTIVE | Noted: 2021-01-01

## 2021-08-31 NOTE — PROGRESS NOTES
Primary Care Provider  Siri Bain APRN   Referring Provider  No ref. provider found      Patient Complaint  Cough (ILD) and Wheezing      Subjective          Deb Harrison presents to [facility] PULMONARY & CRITICAL CARE MEDICINE      History of Presenting Illness  Deb Harrison is a 66 y.o. female here for follow-up for COPD, pulmonary hypertension secondary to congenital heart disease with atrial septal defect status post closure.  She is been doing well on her current inhaled therapies.  Her dyspnea is at baseline.  She gets short of breath walking about 600 feet.  Is moderate severity, worse with activity and relieved with rest.  Otherwise she is doing well.  States that her inhalers she would not be able to breathe.  She is on diuretics and her creatinine is stable.  Her weight is at baseline.  She denies any leg swelling or orthopnea.  Does have elevated right-sided filling pressures secondary to atrial septal defect with closure in the past.  She did have a left heart cath and right heart cath done in 2018 which showed mean PA pressures were 20 and no evidence of pulmonary hypertension however there was tricuspid regurgitation.  She is not hypoxemic and overall her symptoms are at baseline.  She does have a history of sleep apnea but has not had a CPAP machine in the last 18 years.  She reports increasing snoring, poor sleep, excessive daytime sleepiness and fatigue.  She is asking about getting another CPAP machine.    Denies coughing, wheezing, headaches, chest pain, weight loss or hemoptysis. Denies fevers, chills and night sweats.  She is able to perform ADLs without difficulties and denies and swollen glands/lymph nodes in the head or neck.    I have personally reviewed the review of systems, past family, social, medical and surgical histories; and agree with their findings.      Review of Systems  Constitutional symptoms: Fatigue, excessive daytime sleepiness,  otherwise Denied complaints   Ear, nose, throat: Denied complaints  Cardiovascular:  Denied complaints  Respiratory: Dyspnea, snoring, otherwise denied complaints  Gastrointestinal: Denied complaints  Musculoskeletal: Denied complaints        Family History   Problem Relation Age of Onset   • No Known Problems Mother    • No Known Problems Father         Social History     Socioeconomic History   • Marital status:      Spouse name: Not on file   • Number of children: Not on file   • Years of education: Not on file   • Highest education level: Not on file   Tobacco Use   • Smoking status: Former Smoker     Packs/day: 1.00     Years: 30.00     Pack years: 30.00     Types: Cigarettes     Quit date:      Years since quittin.6   • Smokeless tobacco: Never Used   • Tobacco comment: quit in    Vaping Use   • Vaping Use: Never used   Substance and Sexual Activity   • Alcohol use: No   • Drug use: No   • Sexual activity: Defer        Past Medical History:   Diagnosis Date   • Arrhythmia    • Arthritis     fingers, arm,hands   • CHF (congestive heart failure) (CMS/[facility])    • COPD (chronic obstructive pulmonary disease) (CMS/[facility])    • Dermatitis     arm and leg -topical cream BID    • Disease of thyroid gland     hypothyroidism-recently started on meds 15   • Dyspnea    • History of atrial flutter    • History of transfusion     with PFO at age 6   • Hyperlipidemia    • Low back pain    • Lung disease    • On home oxygen therapy     2 liters at night    • Pacemaker at end of battery life     medtronic    • Patent foramen ovale     surgery at 6 years old    • Presence of dental prosthetic device     implants    • Wears glasses         Immunization History   Administered Date(s) Administered   • COVID-19 (ZARINA) 2021   • Hepatitis A 2018, 2018   • Influenza, Unspecified 10/03/2018, 2019   • Pneumococcal Conjugate 13-Valent (PCV13) 2019   • Pneumococcal Polysaccharide  (PPSV23) 09/24/2018   • Shingrix 12/14/2018   • Tdap 05/01/2018         Allergies   Allergen Reactions   • Tylenol Pm Extra Strength [Diphenhydramine-Acetaminophen] Unknown - High Severity   • Adenosine Nausea Only   • Codeine Nausea Only and Dizziness          Current Outpatient Medications:   •  apixaban (Eliquis) 5 MG tablet tablet, , Disp: , Rfl:   •  budesonide (PULMICORT) 0.5 MG/2ML nebulizer solution, 2 mL., Disp: , Rfl:   •  Cholecalciferol (VITAMIN D3) 50 MCG (2000 UT) capsule, Take 2,000 Units by mouth Daily., Disp: , Rfl:   •  dofetilide (Tikosyn) 500 MCG capsule, Tikosyn 500 mcg oral capsule take 1 capsule (0.5 mg) by oral route 2 times per day   Active, Disp: , Rfl:   •  furosemide (LASIX) 40 MG tablet, TAKE 1 TABLET DAILY, Disp: 90 tablet, Rfl: 1  •  gabapentin (NEURONTIN) 600 MG tablet, Take 1 tablet by mouth 3 (Three) Times a Day., Disp: 90 tablet, Rfl: 1  •  levothyroxine (SYNTHROID, LEVOTHROID) 50 MCG tablet, Take 1 tab PO daily on empty stomach, Disp: 90 tablet, Rfl: 0  •  metoprolol tartrate (LOPRESSOR) 25 MG tablet, TAKE 1 TABLET DAILY, Disp: 90 tablet, Rfl: 1  •  O2 (OXYGEN), oxygen 2 L/NC per NC pt wears this at night   Active, Disp: , Rfl:   •  Plecanatide (Trulance) 3 MG tablet, Take 1 tablet by mouth Daily., Disp: 90 tablet, Rfl: 1  •  rosuvastatin (CRESTOR) 20 MG tablet, TAKE 1 TABLET AT BEDTIME, Disp: 90 tablet, Rfl: 3  •  spironolactone (ALDACTONE) 50 MG tablet, TAKE 1 TABLET BY MOUTH EVERY DAY, Disp: 90 tablet, Rfl: 3  •  temazepam (RESTORIL) 30 MG capsule, Take 1 capsule by mouth At Night As Needed for Sleep., Disp: 30 capsule, Rfl: 2  •  tiotropium bromide monohydrate (Spiriva Respimat) 2.5 MCG/ACT aerosol solution inhaler, Spiriva Respimat 2.5 mcg/actuation inhalation mist inhale 2 puffs (5 mcg) by inhalation route once daily at the same time each day   Active, Disp: , Rfl:   •  triamcinolone (KENALOG) 0.1 % cream, , Disp: , Rfl:   •  Zoster Vac Recomb Adjuvanted 50 MCG/0.5ML  "reconstituted suspension, Inject 0.5 mL into the appropriate muscle as directed by prescriber As Needed (once now then repeat in 2 months)., Disp: 1 each, Rfl: 1         Objective     Vital Signs:   BP (!) 84/63 (BP Location: Right arm, Patient Position: Sitting, Cuff Size: Adult)   Pulse 80   Temp 97 °F (36.1 °C) (Tympanic)   Resp 16   Ht 158.8 cm (62.5\")   Wt 79.8 kg (176 lb)   SpO2 95% Comment: room air  BMI 31.68 kg/m²     Physical Exam  Vital Signs Reviewed   WDWN, Alert, NAD.    HEENT:  PERRL, EOMI.  OP, nares clear  Neck:  Supple, no JVD, no thyromegaly  Chest:  good aeration, clear to auscultation bilaterally, tympanic to percussion bilaterally, no work of breathing noted  CV: RRR, systolic murmur appreciated, pulses 2+, equal.  Abd:  Soft, NT, ND, + BS, no HSM, obese  EXT:  no clubbing, no cyanosis, no edema  Neuro:  A&Ox3, CN grossly intact, no focal deficits.  Skin: No rashes or lesions noted       Result Review :   I have personally reviewed our last office note.  Also reviewed Dr. Lucero's last office note.  Also personally reviewed left heart cath and right heart cath from 2018 showing tricuspid regurgitation but normal PA pressures.  I also personally reviewed an echocardiogram from 2021.  Also personally the chest CT from 8/20/2021 showing a stable 2 mm lung nodule, cardiomegaly and emphysema.       Assessment and Plan      Patient Active Problem List   Diagnosis   • Atrial tachycardia (CMS/HCC)   • Atrial flutter (CMS/HCC)   • Valvular heart disease   • ASD (atrial septal defect)   • AV block   • Pulmonary hypertension (CMS/HCC)   • Wrist pain, acute, right   • DDD (degenerative disc disease), lumbar   • Lumbar stenosis   • Hypercalcemia   • Other insomnia   • JAYJAY (obstructive sleep apnea)   • History of repair of congenital atrial septal defect (ASD)       Impression:  Chronic dyspnea  Moderate COPD without exacerbation  Pulmonary hypertension secondary to congenital heart disease.  Last " right heart cath in 2018 showed PA pressures of 20 and no indication to treat for PAH  Atrial septal defect status post closure  Multiple tachyarrhythmias  Tobacco use of cigarettes in remission  Snoring  Excessive daytime sleepiness  History obstructive sleep apnea.  Not wearing CPAP  Obesity with BMI 31.6    Plan:  We will arrange for in lab sleep study now.  Depending on those results, we can arrange for CPAP machine  No indication for pulmonary vasodilators at this time  Continue Spiriva, Pulmicort with albuterol as needed  Diuretics and antiarrhythmics per cardiology  Continue anticoagulation  I personally the patient's echocardiogram and right heart cath back in 2018.  She does have pulmonary hypertension but does not meet criteria for pulmonary vasodilator therapy.  This was secondary to her congenital heart disease.  The severity of her right-sided ventricular enlargement and tricuspid regurg is out of proportion to her PA pressures.  Also, she appears euvolemic and not hypoxemic.  If there is any worsening respiratory status or changes, we can ultimately discuss with cardiology about repeating right heart cath in the future and consider pulmonary vasodilator therapies at that time.  I see no indication to treat at this time.  Diet and exercise counseling provided today.  Recommended 30 minutes daily exercise well is an 1800 -calorie/day diet.  Patient verbalized understanding.  Total time counseling the patient today was 4 minutes  Smoking status: Former smoker  Vaccination status: Up-to-date with flu, Prevnar, Pneumovax, COVID-19 vaccination.  To get flu shot this fall.  Medications personally reviewed    Follow Up   Return in about 3 months (around 11/30/2021).  Patient was given instructions and counseling regarding her condition or for health maintenance advice. Please see specific information pulled into the AVS if appropriate.     Electronically signed by Rick Colmenares MD, 08/31/21, 2:05 PM  EDT.

## 2021-08-31 NOTE — PROGRESS NOTES
Chief Complaint  Back Pain (Pain manag F/U)    Subjective          Deb Harrison who is a 66 y.o. year old female who presents to [facility] NEUROLOGY & NEUROSURGERY for follow up of her low back and left leg pain.    Pt had first injection with pain management. She appreciated improvement in her pain for about 2 weeks, though has started to wear off. She has not heard back from pain management regarding next scheduled injection. She is taking Gabapentin which provides some benefit as well.    She continues to have pain radiating into the left hip and groin, not well explained by her CT Lumbar Spine. She walks with a limp on the left.       Interval History 7/6/21  Deb Harrison who is a 66 y.o. year old female who presents to [facility] NEUROLOGY & NEUROSURGERY for evaluation of her low back pain.     Pt with c/o low back pain, starting around 6 months ago. Pain radiating into the left groin, into the leg and anterior shin. Pain is constant, 7-8/10, increases to severe with activity. Going up stairs, walking and standing seem to aggravate the pain. Pain keeps her awake at night. She was prescribed Tramadol and Baclofen, these did not help. Typically takes Tylenol for pain. Cannot take NSAIDs. She has not had any type of physical therapy. No epidural injections. She has received steroid injections. These help for about a week or so, then pain returns.     She denies any weakness in the legs. Denies any problems with bowel or bladder.      She reports falling about a month ago, when the leg went out on her.      CT Lumbar Spine in April of this year showed multilevel spondylosis with a moderate disc bulge at L4/5 causing mild to moderate canal and right foraminal stenosis, mild foraminal stenosis on the left.      Recent Interventions: Pain management, Gabapentin      Review of Systems   Musculoskeletal: Positive for arthralgias, back pain, gait problem and bursitis.  "  Neurological: Positive for weakness.   All other systems reviewed and are negative.       Objective   Vital Signs:   /43   Pulse 83   Ht 158.8 cm (62.5\")   Wt 81.2 kg (179 lb)   BMI 32.22 kg/m²       Physical Exam  Vitals reviewed.   Constitutional:       Appearance: Normal appearance.   Musculoskeletal:      Lumbar back: Tenderness present. Negative right straight leg raise test and negative left straight leg raise test.      Right hip: Tenderness present. Decreased range of motion.      Left hip: Tenderness present. Decreased range of motion.   Neurological:      Mental Status: She is alert and oriented to person, place, and time.      Gait: Gait is intact.      Deep Tendon Reflexes:      Reflex Scores:       Patellar reflexes are 0 on the right side and 0 on the left side.       Achilles reflexes are 0 on the right side and 0 on the left side.       Neurologic Exam     Mental Status   Oriented to person, place, and time.     Gait, Coordination, and Reflexes     Gait  Gait: normal    Reflexes   Right patellar: 0  Left patellar: 0  Right achilles: 0  Left achilles: 0       Result Review :                 Assessment and Plan    Diagnoses and all orders for this visit:    1. Osteoarthritis of spine with radiculopathy, lumbosacral region (Primary)    2. Left hip pain  -     XR Hip With or Without Pelvis 2 - 3 View Left; Future    Pt has appreciated improvement in pain with injections through pain management. Would recommend continuing these for a total of 3 injections. Will continue her Gabapentin for pain. Her main source of pain today appears to be in the left hip and groin, with notable limp on exam today. We discussed that this would not be well explained by her lumbar spine imaging. Will order an XR of the hip with consideration of referral to Orthopedics pending results. She will follow up in 8 weeks.      Follow Up   Return in about 8 weeks (around 10/26/2021).  Patient was given instructions and " counseling regarding her condition or for health maintenance advice.     -XR Left Hip  -Continue Pain Management  -Continue Gabapentin  -Follow up in 8 weeks

## 2021-09-14 NOTE — PROGRESS NOTES
Deb Harrison  1955  [phone]    09/14/2021    [facility] CARDIOLOGY     Referring Provider: No ref. provider found     Siri Bain, APRN  [address] [place] [address]    Chief Complaint   Patient presents with   • atrial tachycardia     Problem List/PMHx:   1.  Persistent Atrial Flutter/ Paroxysmal Atrial Tachycardia              A.  EP [facility], November 2003, at [facility] with no RFA performed.              B. Recurrent, persistent atrial flutter with rapid ventricular rate and 1-to-1 AV conduction at 240 BPM.              C. EP study, 04/22/2004, with radiofrequency ablation of atrial flutter around the ASD patch,   radiofrequency ablation of isthmus dependent counter clockwise atrial flutter, radiofrequency ablation  of right atrial incisional scar flutter and a fourth atrial flutter which  left atrial in origin and was not mapped   nor ablated.              D.  Recurrent atrial flutter, June 2004. Repeat EP study with successful radiofrequency ablation of a right atrial scar related flutter along the anterolateral wall.               E.  Echocardiogram, 10/03/2007, with EF 55% to 60%, severe TR, mitral valve area of 1.8 cm² consistent with mild mitral stenosis, LA 4.5.             F. Implantation of a dual chamber permanent pacemaker, 02/25/2008 (Medtronic device).             G. EP study 7/31/2008 with radiofrequency ablation of atrial tachycardia from the high anterolateral atrial wall with left atrial flutter, not mapped or ablated.              H. Rythmol increased, 07/31/2008             I. EPS study with RFA for atrial tachycardia, 01/16/2015.              DARRYL Nielsen initiated 01/16/2015             K. RFA AV Node 4/10/16 and RFA of AT and RA scar flutter  2.  Pulmonary Hypertension/TR             A. DELIA, 04/20/2004 with moderate tricuspid regurgitation, mild holosystolic mitral valve prolapse, marked right atrial enlargement at approximately 7-8 cm, normal LV  size and function with LVEF (72%),  right enlarged ventricular size and diminished systolic function.   B.  Echocardiogram, 02/01/2006: EF (70%), moderately dilated RV, mild TR, mild pulmonary hypertension.   C.  Echocardiogram in October 2007: Mild MS, moderate RV enlargement, moderate to severe tricuspid regurgitation, normal LV size and function, RVSP of 45 mmHg.            D. Right heart cath, 08/01/2008 with pulmonary artery pressure of 48/22, RV 53/20, cardiac output 5,  cardiac index 2/8, wedge 26.   E. Echocardiogram, April 2009: LVEF (50% to 55%), severe right ventricular enlargement,  moderate to severe tricuspid insufficiency, mild to moderate MR, RVSP estimated to be 40-50 mmHg.   F.  Echocardiogram, 02/23/2012, with ejection fraction of 50% to 55%, LAE at 4 cm, mild MS with mild restriction of the mitral valve leaflet, prolapse of the posterior leaflet, mean gradient of 3.5 mmHg, mild TR with an RVSP of 31.  G  Echocardiogram, 04/23/2014, revealing EF 45% to 50% with diastolic dysfunction, mild MR, Mild TR. RVSP 42.   H. Echo 2/29/16: LVEF NL, mild TR, mild -mod MR, RV severely dilated   I.  Echocardiogram 3/1/18: EF 66-70%, moderate TR             J.  DELIA 3/22/18: NL LVEF, Moderate MR, Mod TR, severe RV enlargement             K.  R/l HEART CATH 8/18 normal coronary arteries, normalLVEF with mild elevated L pressure, normal pulmonary artery  pressure, moderately elevated right atrial pressure, no  evidence of MS or MR; felt previous ASD responsible for   progressive RV failure.             L. 3/30/21 Echo low normal LVEF of 50%.  Paradoxical septal motion with a flattened septum in systole and diastole consistent with RV pressure overload.  Diastolic function could not be fully assessed secondary to atrial fibrillation Doppler findings consistent with elevated LV filling pressure.  Moderately dilated RV with borderline reduced RV systolic function.  Moderate biatrial enlargement.  Moderate mitral  annular calcification with mitral stenosis and mild MR.  Moderate TR.  Pulmonary artery systolic pressure could not be estimated due to incomplete Doppler signal envelope.  Dilated IVC with blunted respirophasic changes suggestive of elevated central venous pressure  3.  ASD (Atrial Septal Defect), s/p repair age 6.    4.  AVB              A.  S/P MDT dual chamber PPM 2/25/2008              B.  PM generator change out 3/2/17    Allergies  Allergies   Allergen Reactions   • Tylenol Pm Extra Strength [Diphenhydramine-Acetaminophen] Unknown - High Severity   • Adenosine Nausea Only   • Codeine Nausea Only and Dizziness       Current Medications    Current Outpatient Medications:   •  apixaban (Eliquis) 5 MG tablet tablet, 5 mg Every 12 (Twelve) Hours., Disp: , Rfl:   •  budesonide (PULMICORT) 0.5 MG/2ML nebulizer solution, 2 mL., Disp: , Rfl:   •  Cholecalciferol (VITAMIN D3) 50 MCG (2000 UT) capsule, Take 2,000 Units by mouth Daily., Disp: , Rfl:   •  dofetilide (Tikosyn) 500 MCG capsule, Tikosyn 500 mcg oral capsule take 1 capsule (0.5 mg) by oral route 2 times per day   Active, Disp: , Rfl:   •  furosemide (LASIX) 40 MG tablet, TAKE 1 TABLET DAILY, Disp: 90 tablet, Rfl: 1  •  gabapentin (NEURONTIN) 600 MG tablet, Take 1 tablet by mouth 3 (Three) Times a Day., Disp: 90 tablet, Rfl: 1  •  levothyroxine (SYNTHROID, LEVOTHROID) 50 MCG tablet, Take 1 tab PO daily on empty stomach, Disp: 90 tablet, Rfl: 0  •  metoprolol tartrate (LOPRESSOR) 25 MG tablet, TAKE 1 TABLET DAILY, Disp: 90 tablet, Rfl: 1  •  O2 (OXYGEN), oxygen 2 L/NC per NC pt wears this at night   Active, Disp: , Rfl:   •  rosuvastatin (CRESTOR) 20 MG tablet, TAKE 1 TABLET AT BEDTIME, Disp: 90 tablet, Rfl: 3  •  spironolactone (ALDACTONE) 50 MG tablet, TAKE 1 TABLET BY MOUTH EVERY DAY, Disp: 90 tablet, Rfl: 3  •  temazepam (RESTORIL) 30 MG capsule, Take 1 capsule by mouth At Night As Needed for Sleep., Disp: 30 capsule, Rfl: 2  •  tiotropium bromide  "monohydrate (Spiriva Respimat) 2.5 MCG/ACT aerosol solution inhaler, Spiriva Respimat 2.5 mcg/actuation inhalation mist inhale 2 puffs (5 mcg) by inhalation route once daily at the same time each day   Active, Disp: , Rfl:   •  triamcinolone (KENALOG) 0.1 % cream, , Disp: , Rfl:   •  Zoster Vac Recomb Adjuvanted 50 MCG/0.5ML reconstituted suspension, Inject 0.5 mL into the appropriate muscle as directed by prescriber As Needed (once now then repeat in 2 months)., Disp: 1 each, Rfl: 1  •  Plecanatide (Trulance) 3 MG tablet, Take 1 tablet by mouth Daily., Disp: 90 tablet, Rfl: 1    History of Present Illness     Pt presents for follow up of AF/AFL/AVB/VHD/Pulm HTN. Since we last saw the p pt denies any AF episodes, SOB, CP, LH, and dizziness. Denies any hospitalizations, ER visits, bleeding, or TIA/CVA symptoms. Overall feels well.    ROS:  General: +fatigue, weight gain or loss  Cardiovascular:  Denies CP, PND, syncope, near syncope, edema or palpitations.  Pulmonary:  + mild chronic BUTT, cough, or wheezing    Vitals:    09/14/21 1447   BP: 118/64   BP Location: Left arm   Patient Position: Sitting   Pulse: 104   SpO2: 95%   Weight: 80.7 kg (178 lb)   Height: 162.6 cm (64\")     Body mass index is 30.55 kg/m².  PE:  General: NAD  Neck: no JVD, no carotid bruits, no TM  Heart RRR, NL S1, S2, S4 present, no rubs, + TR murmur  Lungs: CTA, no wheezes, rhonchi, or rales  Abd: soft, non-tender, NL BS  Ext: No musculoskeletal deformities, no edema, cyanosis, or clubbing  Psych: normal mood and affect    Diagnostic Data:  Battery life 5.5 years  Stable impedances, acceptable pacing and sensing thresholds. AP57%, VP99%. Arrythmia episodes: 0.5%MS, pt is dependant       ECG 12 Lead    Date/Time: 9/14/2021 3:19 PM  Performed by: Ying Sy APRN  Authorized by: Yossi, Ying, APRN   Comparison: compared with previous ECG from 3/21/2021  Similar to previous ECG  Rhythm: sinus rhythm  BPM: 104            1. AV block    2. " Atrial tachycardia (CMS/HCC)    3. Atypical atrial flutter (CMS/HCC)    4. Valvular heart disease      Plan:  1) Atrial tach/atrial fibrillation; well-controlled on Tikosyn [place] less than 1%.  QTc stable in the setting of RV pacing   Continue present medications.   2) AV block: Normal pacemaker function.    3) Anticoagulation  Continue Eliquis   4) pulmonary HTN; follows up with her pulmonologist.  5) Valvular heart disease: Echo from 3/30/21 reviewed- Mild regurgitation read as Mild (previously in the moderate category), Moderate TR - stable from previous.      F/up in 6 months  Electronically signed by NIRU Lawrence, 09/14/21, 3:32 PM EDT.

## 2021-09-15 NOTE — TELEPHONE ENCOUNTER
Patient called and stated that she still has not heard anything about her sleep study that is suppose to be scheduled at [facility]. Please advise.

## 2021-09-15 NOTE — TELEPHONE ENCOUNTER
Called patient, I have submitted all information to insurance, awaiting approval    Will call insurance tomorrow morning to see if already approved, as patient states she received a letter stating something had been approved, however she was not sure if it was for sleep study or not.    Informed patient I would contact her back once I know what insurance has said, and will then schedule if approved.

## 2021-09-21 NOTE — TELEPHONE ENCOUNTER
Procedure has been approved.  Given to priscilla for scheduling at [facility]  Patient will be notified after scheduled

## 2021-09-23 PROBLEM — M16.9 OA (OSTEOARTHRITIS) OF HIP: Status: ACTIVE | Noted: 2021-01-01

## 2021-09-23 NOTE — PROGRESS NOTES
"Chief Complaint  Initial Evaluation of the Left Hip     Subjective      Deb Harrison presents to [facility] for an evaluation of left hip. Patient has been having left hip pain for approximately a year. She states her and her pcp have been treating her left hip pain with oral steroids and steroid medication. She has a history of a right total hip arthroplasty. She has been trying to hold off on a left hip replacement for as long as she can. She uses a cane as needed. She has pain in her groin with ambulation, walking and standing.     Allergies   Allergen Reactions   • Tylenol Pm Extra Strength [Diphenhydramine-Acetaminophen] Unknown - High Severity   • Adenosine Nausea Only   • Codeine Nausea Only and Dizziness        Social History     Socioeconomic History   • Marital status:      Spouse name: Not on file   • Number of children: Not on file   • Years of education: Not on file   • Highest education level: Not on file   Tobacco Use   • Smoking status: Former Smoker     Packs/day: 1.00     Years: 30.00     Pack years: 30.00     Types: Cigarettes     Quit date:      Years since quittin.7   • Smokeless tobacco: Never Used   • Tobacco comment: quit in    Vaping Use   • Vaping Use: Never used   Substance and Sexual Activity   • Alcohol use: No   • Drug use: No   • Sexual activity: Defer        Review of Systems     Objective   Vital Signs:   Pulse 76   Ht 162.6 cm (64\")   Wt 84 kg (185 lb 3.2 oz)   SpO2 97%   BMI 31.79 kg/m²       Physical Exam  Constitutional:       Appearance: Normal appearance. Patient is well-developed and normal weight.   HENT:      Head: Normocephalic.      Right Ear: Hearing and external ear normal.      Left Ear: Hearing and external ear normal.      Nose: Nose normal.   Eyes:      Conjunctiva/sclera: Conjunctivae normal.   Cardiovascular:      Rate and Rhythm: Normal rate.   Pulmonary:      Effort: Pulmonary effort is normal.      " Breath sounds: No wheezing or rales.   Abdominal:      Palpations: Abdomen is soft.      Tenderness: There is no abdominal tenderness.   Musculoskeletal:      Cervical back: Normal range of motion.   Skin:     Findings: No rash.   Neurological:      Mental Status: Patient is alert and oriented to person, place, and time.   Psychiatric:         Mood and Affect: Mood and affect normal.         Judgment: Judgment normal.       Ortho Exam      LEFT HIP: Antalgic gait. Dorsal Pedal Pulse 2+, posterior tibialis pulse 2+. Calf supple, non-tender. Good tone of hip flexors, hip extensors, hip adductor, hip abductors. Skin intact. Full weight bearing. Good strength to hamstrings, quadriceps, dorsiflexors and plantar flexors. Tender hip and pelvic muscles. Patient struggles getting up from a seated position. Groin pain with hip range of motion. Groin pain with leg raise. Decreased hip range of motion.       Procedures      Imaging Results (Most Recent)     Procedure Component Value Units Date/Time    XR Hip With or Without Pelvis 2 - 3 View Left [[id number]] Resulted: 09/23/21 1324     Updated: 09/23/21 1327           Result Review :       XR Hip With or Without Pelvis 2 - 3 View Left    Result Date: 9/10/2021  Narrative: PROCEDURE: XR HIP W OR WO PELVIS 2-3 VIEW LEFT  COMPARISON: Lumbar spine radiographs 2/22/2021.  CT lumbar spine 4/30/2021.  CT abdomen and pelvis 6/21/2010.  INDICATIONS: Left hip and groin pain  FINDINGS:  Diffuse osteopenia.  No evidence of acute fracture or dislocation.  Moderate to advanced left hip joint DJD.  Partially imaged right KULDEEP with the visualized hardware in its expected position and without evidence of hardware complications.  Mild bilateral SI joint DJD.  Partially imaged lumbosacral spondylosis.  Multiple phleboliths in the pelvis.  CONCLUSION:  1. Diffuse osteopenia, without radiographic evidence of acute fracture dislocation. 2. Moderate to advanced left hip joint DJD. 3. Partially imaged  right KULDEEP.      SATNAM GARRETT MD       Electronically Signed and Approved By: SATNAM GARRETT MD on 9/10/2021 at 12:20                 X-Ray Report:  Left hip(s) X-Ray  Indication: Evaluation of left hip   AP and Lateral view(s)  Findings: Advanced changes of the left hip consistent with osteoarthritis.   Prior studies available for comparison: yes       Assessment and Plan     DX: Left hip osteoarthritis     Patient is a candidate for a left total hip arthroplasty. Operative vs non-operative measures.PAtient wishes to proceed with a left total hip arthroplasty. She has tried treating her hip conservatively for 6 years.      Patient takes Eliquis because of her heart, she has a pace maker. We discussed needing to obtain clearance before surgery.     Discussed surgery., Risks/benefits discussed with patient including, but not limited to: infection, bleeding, neurovascular damage, malunion, nonunion, aesthetic deformity, need for further surgery, and death., Discussed with patient the implant type being used during surgery and patient understands and desires to proceed., Surgery pamphlet given. and Call or return if worsening symptoms.    Follow Up     Patient placed on the surgery schedule waiting list.       Patient was given instructions and counseling regarding her condition or for health maintenance advice. Please see specific information pulled into the AVS if appropriate.     Scribed for Lainey Blake MD by Zoe Rodriguez.  09/23/21   12:57 EDT        I have personally performed the services described in this document as scribed by the above individual and it is both accurate and complete. Lainey Blake MD 09/23/21

## 2021-10-21 NOTE — PROGRESS NOTES
Chief Complaint  Follow-up and Rapid Heart Rate    Subjective            Deb Harrison presents to [facility]  History of Present Illness  Ms. Harrison presents for routine follow up today and states she has been feeling well and remains at her chronic baseline.  She continues to follow closely with Dr. Tavarez and saw his nurse practitioner last month.  Her PPM was interrogated at that time and showed a <1% arrhythmia burden and normal device function.  Her last echocardiogram in March 2021 showed low normal left ventricular systolic function with an estimated ejection fraction of 50% and a moderately dilated right ventricle with borderline reduced right ventricular systolic function. She has a complex cardiac history, including surgical repair of a congenital atrial septal defect with past closure at age 6, as well as chronic right ventricular failure secondary to previous volume overload from the ASD. In addition, she follows with Dr. Tavarez regularly for a history of atypical atrial flutter, status post multiple radiofrequency ablations, as well as a history of atrial tachycardia and paroxysmal atrial fibrillation. She has a permanent pacemaker (placed for AV block) and it is interrogated regularly by Dr. Tavarez's office.  She also has a history of diastolic dysfunction and mild pulmonary hypertension (followed by her pulmonologist).  Ms. Harrison remains on chronic anticoagulation with Eliquis and has not experienced any bleeding problems.  She also remains on chronic therapy with Tikosyn and her recent QTc measures have been stable.  The patient does not report any episodes of palpitations, chest pain/pressure, PND, lightheadedness or syncope.  She previously experienced some dizziness back in the spring, but states this has resolved.  Her primary complaint today is chronic unilateral right lower extremity edema/swelling.  She reports only minimal swelling in the left  lower extremity.  No history of DVT reported, and she has not experienced any erythema, warmth, or pain in her right leg.       Past Medical History:   Diagnosis Date   • Arrhythmia    • Arthritis     fingers, arm,hands   • CHF (congestive heart failure) (Formerly [facility])    • COPD (chronic obstructive pulmonary disease) (Formerly [facility])    • Dermatitis     arm and leg -topical cream BID    • Disease of thyroid gland     hypothyroidism-recently started on meds 15   • Dyspnea    • History of atrial flutter    • History of transfusion     with PFO at age 6   • Hyperlipidemia    • Low back pain    • Lung disease    • On home oxygen therapy     2 liters at night    • Pacemaker at end of battery life     medtronic    • Patent foramen ovale     surgery at 6 years old    • Presence of dental prosthetic device     implants    • Wears glasses        Past Surgical History:   • CARDIAC CATHETERIZATION    Procedure: Right and Left Heart Cath;  Surgeon: Mark Carrasco IV, MD;  Location:  [facility];  Service: Cardiovascular   • CARDIAC ELECTROPHYSIOLOGY PROCEDURE    Procedure: PPM generator change - dual;  Surgeon: Manuel Tavarez MD;  Location:  [facility];  Service:    • CATARACT EXTRACTION   • COLONOSCOPY   • DENTAL PROCEDURE   • EP STUDY    ablation -several times for atrial flutter/atrial fib    • HERNIA REPAIR   • HYSTERECTOMY   • JOINT REPLACEMENT    right hip replacement    • PACEMAKER IMPLANTATION   • PATENT FORAMEN OVALE CLOSURE    age 6       Social History     Tobacco Use   • Smoking status: Former Smoker     Packs/day: 1.00     Years: 30.00     Pack years: 30.00     Types: Cigarettes     Quit date:      Years since quittin.6   • Smokeless tobacco: Never Used   • Tobacco comment: quit in    Vaping Use   • Vaping Use: Never used   Substance Use Topics   • Alcohol use: No   • Drug use: No       Family History   Problem Relation Age of Onset   • No Known Problems Mother    • No  Known Problems Father         Current Outpatient Medications on File Prior to Visit   Medication Sig   • apixaban (Eliquis) 5 MG tablet tablet 5 mg Every 12 (Twelve) Hours.   • budesonide (PULMICORT) 0.5 MG/2ML nebulizer solution 2 mL.   • Cholecalciferol (VITAMIN D3) 50 MCG (2000 UT) capsule Take 2,000 Units by mouth Daily.   • dofetilide (Tikosyn) 500 MCG capsule Tikosyn 500 mcg oral capsule take 1 capsule (0.5 mg) by oral route 2 times per day   Active   • furosemide (LASIX) 40 MG tablet TAKE 1 TABLET DAILY   • gabapentin (NEURONTIN) 600 MG tablet Take 1 tablet by mouth 3 (Three) Times a Day.   • levothyroxine (SYNTHROID, LEVOTHROID) 50 MCG tablet TAKE 1 TABLET BY MOUTH EVERY MORNING on empty stomach **dose decrease**   • metoprolol tartrate (LOPRESSOR) 25 MG tablet TAKE 1 TABLET DAILY   • rosuvastatin (CRESTOR) 20 MG tablet TAKE 1 TABLET AT BEDTIME   • spironolactone (ALDACTONE) 50 MG tablet TAKE 1 TABLET BY MOUTH EVERY DAY   • O2 (OXYGEN) oxygen 2 L/NC per NC pt wears this at night   Active   • Plecanatide (Trulance) 3 MG tablet Take 1 tablet by mouth Daily.   • temazepam (RESTORIL) 30 MG capsule Take 1 capsule by mouth At Night As Needed for Sleep.   • tiotropium bromide monohydrate (Spiriva Respimat) 2.5 MCG/ACT aerosol solution inhaler Spiriva Respimat 2.5 mcg/actuation inhalation mist inhale 2 puffs (5 mcg) by inhalation route once daily at the same time each day   Active   • triamcinolone (KENALOG) 0.1 % cream    • Zoster Vac Recomb Adjuvanted 50 MCG/0.5ML reconstituted suspension Inject 0.5 mL into the appropriate muscle as directed by prescriber As Needed (once now then repeat in 2 months).     No current facility-administered medications on file prior to visit.       Allergies   Allergen Reactions   • Tylenol Pm Extra Strength [Diphenhydramine-Acetaminophen] Unknown - High Severity   • Adenosine Nausea Only   • Codeine Nausea Only and Dizziness       Review of Systems   Constitutional: Positive for  "fatigue. Negative for fever, unexpected weight gain and unexpected weight loss.   HENT: Negative for hearing loss and nosebleeds.    Eyes: Negative for pain and visual disturbance.   Respiratory: Positive for cough and shortness of breath. Negative for chest tightness and wheezing.    Cardiovascular: Positive for leg swelling. Negative for chest pain and palpitations.   Gastrointestinal: Negative for abdominal pain, blood in stool and vomiting.   Genitourinary: Negative for dysuria and hematuria.   Musculoskeletal: Positive for arthralgias. Negative for back pain and myalgias.   Skin: Negative for color change and rash.   Neurological: Negative for syncope, weakness, light-headedness and headache.   Hematological: Negative for adenopathy. Does not bruise/bleed easily.        Objective     /64 (BP Location: Left arm, Patient Position: Sitting, Cuff Size: Adult)   Pulse 94   Ht 162.6 cm (64\")   Wt 81.6 kg (180 lb)   BMI 30.90 kg/m²       Physical Exam  Constitutional:       General: She is not in acute distress.     Appearance: Normal appearance.   HENT:      Head: Atraumatic.      Mouth/Throat:      Mouth: Mucous membranes are moist.      Pharynx: Oropharynx is clear. No oropharyngeal exudate.   Eyes:      General: No scleral icterus.     Conjunctiva/sclera: Conjunctivae normal.   Neck:      Vascular: No carotid bruit or JVD.   Cardiovascular:      Rate and Rhythm: Normal rate and regular rhythm.  No extrasystoles are present.     Pulses:           Radial pulses are 2+ on the right side and 2+ on the left side.      Heart sounds: S1 normal and S2 normal. Murmur heard.    Systolic murmur is present with a grade of 2/6.  No friction rub. No gallop. No S3 or S4 sounds.       Comments: 2/6 systolic murmur at RLSB  Pulmonary:      Effort: Pulmonary effort is normal. No tachypnea or respiratory distress.      Breath sounds: No decreased breath sounds, wheezing, rhonchi or rales.   Chest:      Chest wall: No " tenderness.   Abdominal:      General: Bowel sounds are normal. There is no distension.      Palpations: Abdomen is soft. There is no mass.      Tenderness: There is no abdominal tenderness.      Comments: Obese.   Musculoskeletal:         General: No swelling, tenderness or deformity.      Cervical back: Neck supple. No tenderness.      Right lower leg: No edema.      Left lower leg: No edema.   Skin:     General: Skin is warm and dry.      Coloration: Skin is not jaundiced.      Findings: No erythema or rash.      Nails: There is no clubbing.   Neurological:      General: No focal deficit present.      Mental Status: She is alert and oriented to person, place, and time.      Motor: No weakness.   Psychiatric:         Mood and Affect: Mood normal.         Behavior: Behavior normal.         Result Review :     The following data was reviewed by: Phuc Lucero MD on 10/12/2021:    CMP    CMP 3/31/21 5/26/21 6/8/21 6/8/21      1004 1004   Glucose 83 84     BUN 12 9     Creatinine 0.86 0.86     Sodium 143 144     Potassium 3.8 4.3     Chloride 103 102     Calcium 9.8 10.5 (A) 9.6    Total Protein    7.1   Albumin 4.8 4.7  4.1   Globulin    3.0   Total Bilirubin 0.78 0.94     Alkaline Phosphatase 57 57     AST (SGOT) 17 21     ALT (SGPT) 13 18     (A) Abnormal value            CBC    CBC 3/21/21 3/22/21 3/31/21   WBC 8.14 8.48 7.46   RBC 4.71 4.46 4.57   Hemoglobin 15.0 14.4 14.6   Hematocrit 43.9 42.0 43.1   MCV 93.2 94.2 94.3   MCH 31.8 (A) 32.3 (A) 31.9 (A)   MCHC 34.2 34.3 33.9   RDW 12.4 12.5 12.7   Platelets 170 161 181   (A) Abnormal value            Lipid Panel    Lipid Panel 3/22/21 3/31/21 5/26/21   Total Cholesterol 97 (A) 108 104 (A)   Triglycerides 192 (A) 136 140   HDL Cholesterol 32 (A) 44 38 (A)   VLDL Cholesterol 38 (A) 27 28   LDL Cholesterol  27 (A) 37 (A) 38 (A)   (A) Abnormal value       Comments are available for some flowsheets but are not being displayed.                Assessment and Plan       Diagnoses and all orders for this visit:    1. Edema of right lower extremity (Primary)  -     Venous w Reflux Lower Extremity - Right/Left CAR; Future    2. Atypical atrial flutter (HCC)    3. Atrial tachycardia, paroxysmal (HCC)    4. History of repair of congenital atrial septal defect (ASD)    5. Chronic anticoagulation    6. Presence of cardiac pacemaker    7. Need for immunization against influenza    -Unilateral right lower extremity edema:  Will check a right lower extremity venous ultrasound to rule out DVT (unlikely) and assess for significant venous reflux.  Elevation of her leg for several hours each day was recommended, as was the use of compression stockings.  Continue current diuretic therapy pending results of the ultrasound.     -History of atypical atrial flutter, atrial tachycardia, and PAF (s/p multiple radiofrequency ablations and on chronic anti-arrhythmic therapy with Tikosyn):  Her arrhythmias are primarily followed by Dr. Tavarez.  Her recent PPM interrogation showed normal device function with a <1% arrhythmia burden.  Continue Tikosyn; her QTc has remained stable in the setting of RV pacing on multiple recent checks.  Continue chronic anticoagulation with Eliquis; no bleeding issues reported.     -AV block:  Recent PPM interrogation showed normal device function with adequate remaining battery life.  Her device is managed by Dr. Tavarez.    -Mild-moderate tricuspid regurgitation:  Stable, no new symptoms reported and no evidence of decompensated CHF.    -History of congential ASD repair with patch closure at age 6:  Her previous echos have not demonstrated any evidence of a residual ASD.  She does have a chronic moderately dilated right ventricle.       Follow Up     Return in about 9 months (around 7/12/2022) for Next scheduled follow up.    Patient was given instructions and counseling regarding her condition or for health maintenance advice. Please see specific information  pulled into the AVS if appropriate.     Deb Harrison  reports that she quit smoking about 13 years ago. Her smoking use included cigarettes. She has a 30.00 pack-year smoking history. She has never used smokeless tobacco.. I have educated her on the risk of diseases from using tobacco products such as cancer, COPD and heart disease.  Continued tobacco cessation was strongly recommended.

## 2021-10-25 NOTE — TELEPHONE ENCOUNTER
----- Message from Anaya Roman RN sent at 10/25/2021 10:04 AM EDT -----    ----- Message -----  From: Phuc Lucero MD  Sent: 10/25/2021   9:56 AM EDT  To: Anaya Roman RN    Normal scan

## 2021-10-26 NOTE — PROGRESS NOTES
"Chief Complaint  Back Pain    Subjective          Deb Harrison who is a 66 y.o. year old female who presents to [facility] for follow up of her back and leg pain.     Pt appreciating improvement in her pain with epidural injections with pain management. She received her second injection last week. She is no longer taking Gabapentin.     At her last visit her pain was primarily in the left hip and groin. We ordered an XR of the left hip, showing OA and she was referred to Dr. Blake. She is scheduled for KULDEEP in November.     Recent Interventions: TFESI      Review of Systems   Musculoskeletal: Positive for arthralgias, back pain and gait problem.   All other systems reviewed and are negative.       Objective   Vital Signs:   /57   Pulse 87   Ht 162.6 cm (64\")   Wt 82.6 kg (182 lb)   BMI 31.24 kg/m²       Physical Exam  Vitals reviewed.   Constitutional:       Appearance: Normal appearance.   Neurological:      Mental Status: She is alert and oriented to person, place, and time.        Neurologic Exam     Mental Status   Oriented to person, place, and time.   Level of consciousness: alert    Gait, Coordination, and Reflexes Limp on the left        Result Review :       Data reviewed: Radiologic studies XR Left Hip shows moderate to advanced left hip joint DJD.           Assessment and Plan    Diagnoses and all orders for this visit:    1. Osteoarthritis of spine with radiculopathy, lumbosacral region (Primary)    Pt has appreciated improvement in her pain symptoms with TFESI. She is scheduled for KULDEEP on the left. She can follow up in our office on an as needed basis.       Follow Up   Return if symptoms worsen or fail to improve.  Patient was given instructions and counseling regarding her condition or for health maintenance advice.     -Continue with Pain Management  -Follow up as needed    "

## 2021-11-01 NOTE — PROGRESS NOTES
Chief Complaint  Chills (Headache and chills x 6 days. ) and Headache    Subjective          Deb Harrison presents to [facility]  Pt is on oxygen at home and oxygen sat has been around 90%, which is her normal.  Pt is sating 89% without oxygen here.    URI   This is a new problem. Episode onset: 1 week. The problem has been gradually worsening. The maximum temperature recorded prior to her arrival was 100.4 - 100.9 F. Associated symptoms include congestion, coughing and a sore throat. Pertinent negatives include no abdominal pain, chest pain, diarrhea, dysuria, ear pain, headaches, joint pain, joint swelling, nausea, neck pain, plugged ear sensation, rash, rhinorrhea, sinus pain, sneezing, swollen glands, vomiting or wheezing. She has tried nothing for the symptoms.       Objective   Allergies   Allergen Reactions   • Tylenol Pm Extra Strength [Diphenhydramine-Acetaminophen] Unknown - High Severity   • Adenosine Nausea Only   • Codeine Nausea Only and Dizziness     Immunization History   Administered Date(s) Administered   • COVID-19 (ZARINA) 03/05/2021   • Hepatitis A 05/01/2018, 11/06/2018   • Influenza, Unspecified 10/03/2018, 09/24/2019   • Pneumococcal Conjugate 13-Valent (PCV13) 12/02/2019   • Pneumococcal Polysaccharide (PPSV23) 09/24/2018   • Shingrix 12/14/2018   • Tdap 05/01/2018       Vital Signs:   Vitals:    11/01/21 1308   Pulse: 97   Temp: 98.1 °F (36.7 °C)   SpO2: (!) 89%       Physical Exam  Vitals reviewed.   Constitutional:       Appearance: Normal appearance. She is well-developed.   HENT:      Head: Normocephalic and atraumatic.      Right Ear: Tympanic membrane, ear canal and external ear normal.      Left Ear: Tympanic membrane, ear canal and external ear normal.      Nose: Nose normal.      Mouth/Throat:      Mouth: Mucous membranes are moist.      Pharynx: Oropharynx is clear. Posterior oropharyngeal erythema present. No oropharyngeal exudate.    Eyes:      Conjunctiva/sclera: Conjunctivae normal.      Pupils: Pupils are equal, round, and reactive to light.   Cardiovascular:      Rate and Rhythm: Normal rate and regular rhythm.      Pulses: Normal pulses.      Heart sounds: Normal heart sounds. No murmur heard.  No friction rub. No gallop.    Pulmonary:      Effort: Pulmonary effort is normal.      Breath sounds: Normal breath sounds. No wheezing or rhonchi.   Abdominal:      General: Bowel sounds are normal. There is no distension.      Palpations: Abdomen is soft.      Tenderness: There is no abdominal tenderness.   Musculoskeletal:         General: Normal range of motion.      Cervical back: Normal range of motion and neck supple.   Skin:     General: Skin is warm and dry.      Capillary Refill: Capillary refill takes less than 2 seconds.   Neurological:      General: No focal deficit present.      Mental Status: She is alert and oriented to person, place, and time.      Cranial Nerves: No cranial nerve deficit.   Psychiatric:         Mood and Affect: Mood and affect normal.         Behavior: Behavior normal.         Thought Content: Thought content normal.         Judgment: Judgment normal.        Result Review :   The following data was reviewed by: Nazario Mendieta MD on 11/01/2021:    Data reviewed: Radiologic studies I viewed and interpretted 2 views of chest x-rays:NAD.          Assessment and Plan    Diagnoses and all orders for this visit:    1. Cough (Primary)  -     POCT SARS-CoV-2 Antigen KISHORE  -     XR Chest PA & Lateral (In Office)  -     COVID-19,[facility],NP/OP Swab in Transport Media or ESwab 72 HR TAT - Swab, Nasopharynx; Future  -     COVID-19,[facility],NP/OP Swab in Transport Media or ESwab 72 HR TAT - Swab, Nasopharynx    2. Bronchitis  -     predniSONE (DELTASONE) 20 MG tablet; Take 3 tablets by mouth Daily for 5 days.  Dispense: 15 tablet; Refill: 0  -     albuterol sulfate  (90 Base) MCG/ACT inhaler; Inhale 2 puffs Every 6 (Six)  Hours As Needed for Wheezing.  Dispense: 18 g; Refill: 1  -     cefdinir (OMNICEF) 300 MG capsule; Take 1 capsule by mouth 2 (Two) Times a Day for 7 days.  Dispense: 14 capsule; Refill: 0  -     saccharomyces boulardii (Florastor) 250 MG capsule; Take 1 capsule by mouth 2 (Two) Times a Day for 10 days.  Dispense: 20 capsule; Refill: 0            Follow Up   Return in about 1 week (around 11/8/2021), or if symptoms worsen or fail to improve.  Patient was given instructions and counseling regarding her condition or for health maintenance advice. Please see specific information pulled into the AVS if appropriate.

## 2021-11-08 NOTE — PATIENT INSTRUCTIONS
Sleep Apnea  Sleep apnea affects breathing during sleep. It causes breathing to stop for a short time or to become shallow. It can also increase the risk of:  · Heart attack.  · Stroke.  · Being very overweight (obese).  · Diabetes.  · Heart failure.  · Irregular heartbeat.  The goal of treatment is to help you breathe normally again.  What are the causes?  There are three kinds of sleep apnea:  · Obstructive sleep apnea. This is caused by a blocked or collapsed airway.  · Central sleep apnea. This happens when the brain does not send the right signals to the muscles that control breathing.  · Mixed sleep apnea. This is a combination of obstructive and central sleep apnea.  The most common cause of this condition is a collapsed or blocked airway. This can happen if:  · Your throat muscles are too relaxed.  · Your tongue and tonsils are too large.  · You are overweight.  · Your airway is too small.  What increases the risk?  · Being overweight.  · Smoking.  · Having a small airway.  · Being older.  · Being male.  · Drinking alcohol.  · Taking medicines to calm yourself (sedatives or tranquilizers).  · Having family members with the condition.  What are the signs or symptoms?  · Trouble staying asleep.  · Being sleepy or tired during the day.  · Getting angry a lot.  · Loud snoring.  · Headaches in the morning.  · Not being able to focus your mind (concentrate).  · Forgetting things.  · Less interest in sex.  · Mood swings.  · Personality changes.  · Feelings of sadness (depression).  · Waking up a lot during the night to pee (urinate).  · Dry mouth.  · Sore throat.  How is this diagnosed?  · Your medical history.  · A physical exam.  · A test that is done when you are sleeping (sleep study). The test is most often done in a sleep lab but may also be done at home.  How is this treated?    · Sleeping on your side.  · Using a medicine to get rid of mucus in your nose (decongestant).  · Avoiding the use of alcohol,  medicines to help you relax, or certain pain medicines (narcotics).  · Losing weight, if needed.  · Changing your diet.  · Not smoking.  · Using a machine to open your airway while you sleep, such as:  ? An oral appliance. This is a mouthpiece that shifts your lower jaw forward.  ? A CPAP device. This device blows air through a mask when you breathe out (exhale).  ? An EPAP device. This has valves that you put in each nostril.  ? A BPAP device. This device blows air through a mask when you breathe in (inhale) and breathe out.  · Having surgery if other treatments do not work.  It is important to get treatment for sleep apnea. Without treatment, it can lead to:  · High blood pressure.  · Coronary artery disease.  · In men, not being able to have an erection (impotence).  · Reduced thinking ability.  Follow these instructions at home:  Lifestyle  · Make changes that your doctor recommends.  · Eat a healthy diet.  · Lose weight if needed.  · Avoid alcohol, medicines to help you relax, and some pain medicines.  · Do not use any products that contain nicotine or tobacco, such as cigarettes, e-cigarettes, and chewing tobacco. If you need help quitting, ask your doctor.  General instructions  · Take over-the-counter and prescription medicines only as told by your doctor.  · If you were given a machine to use while you sleep, use it only as told by your doctor.  · If you are having surgery, make sure to tell your doctor you have sleep apnea. You may need to bring your device with you.  · Keep all follow-up visits as told by your doctor. This is important.  Contact a doctor if:  · The machine that you were given to use during sleep bothers you or does not seem to be working.  · You do not get better.  · You get worse.  Get help right away if:  · Your chest hurts.  · You have trouble breathing in enough air.  · You have an uncomfortable feeling in your back, arms, or stomach.  · You have trouble talking.  · One side of your  body feels weak.  · A part of your face is hanging down.  These symptoms may be an emergency. Do not wait to see if the symptoms will go away. Get medical help right away. Call your local emergency services (911 in the [place] Do not drive yourself to the hospital.  Summary  · This condition affects breathing during sleep.  · The most common cause is a collapsed or blocked airway.  · The goal of treatment is to help you breathe normally while you sleep.  This information is not intended to replace advice given to you by your health care provider. Make sure you discuss any questions you have with your health care provider.  Document Revised: 10/04/2019 Document Reviewed: 08/13/2019  [employer] Patient Education © 2021 [employer]

## 2021-11-08 NOTE — PROGRESS NOTES
Primary Care Provider  Siri Bain APRN     Referring Provider  No ref. provider found     Chief Complaint  Sleep Apnea    Subjective          History of Presenting Illness  Patient is a 66-year-old female, patient of Dr. Morris who has COPD, pulmonary hypertension secondary to congenital heart disease with atrial septal defect status post closure, and obstructive sleep apnea who presents for follow-up visit today.  Patient states that since last visit she was recently treated for bronchitis with antibiotics and prednisone by her primary care provider.  Patient states patient states that she is feeling better and is scheduled to follow-up with her primary care provider this Thursday, November 18, 2021.  Patient did have a chest x-ray completed on November 1, 2021 and report showed no acute change from 12/10/2020.  No new dense consolidation.  No visible pleural fluid or pneumothorax.  Cardiomegaly is similar.  Patient was also tested for Covid on 11/1/2021 and the came back not detected.  Patient states that she still does get short of breath that is worse with exertion, moderate severity, and improved with rest.  Patient states that she still has a cough however, it has improved.  Patient states she is taking Brovana, Pulmicort, and Spiriva every day as prescribed use albuterol inhaler and albuterol nebulizer treatments as needed. Since last office visit patient had a sleep study completed on 9/29/2021.  Sleep study report showed obstructive sleep apnea recommending CPAP titration study.  Patient states that she still does get morning headaches.  Patient states that she is getting ready to have surgery on her hip and will not be able do titration study until January, 2022.  Patient is requesting had the study completed in [place], [place].  Patient states that she also sees a cardiologist, Dr. Lucero.  Patient denies fever, chills, night sweats, swollen glands in the head and neck, unintentional  weight loss, hemoptysis, purulent sputum production, dysphagia, chest pain, palpitations, chest tightness, abdominal pain, nausea, vomiting, and diarrhea.  Patient also denies any myalgias, changes in sense of taste and/or smell, sore throat, any other coronavirus or flu-like symptoms.  Patient denies any leg swelling, orthopnea, paroxysmal nocturnal dyspnea.  Patient is able to perform activities of daily living.      Review of Systems   Constitutional: Negative for activity change, appetite change, chills, diaphoresis, fatigue, fever, unexpected weight gain and unexpected weight loss.        Negative for Insomnia   HENT: Negative for congestion (Nasal), mouth sores, nosebleeds, postnasal drip, sore throat, swollen glands and trouble swallowing.         Negative for Thrush  Negative for Hoarseness  Negative for Allergies/Hay Fever  Negative for Recent Head injury  Negative for Ear Fullness  Negative for Nasal or Sinus pain  Negative for Dry lips  Negative for Nasal discharge   Respiratory: Positive for cough and shortness of breath. Negative for apnea, chest tightness and wheezing.         Negative for Hemoptysis  Negative for Pleuritic pain   Cardiovascular: Negative for chest pain, palpitations and leg swelling.        Negative for Claudication  Negative for Cyanosis  Negative for Dyspnea on exertion   Gastrointestinal: Negative for abdominal pain, diarrhea, nausea, vomiting and GERD.   Musculoskeletal: Negative for joint swelling and myalgias.        Negative for Joint pain  Negative for Joint stiffness   Skin: Negative for color change, dry skin, pallor and rash.   Neurological: Negative for syncope, weakness and headache.   Hematological: Negative for adenopathy. Does not bruise/bleed easily.        Family History   Problem Relation Age of Onset   • No Known Problems Mother    • No Known Problems Father         Social History     Socioeconomic History   • Marital status:    Tobacco Use   • Smoking  status: Former Smoker     Packs/day: 1.00     Years: 30.00     Pack years: 30.00     Types: Cigarettes     Quit date:      Years since quittin.8   • Smokeless tobacco: Never Used   • Tobacco comment: quit in    Vaping Use   • Vaping Use: Never used   Substance and Sexual Activity   • Alcohol use: No   • Drug use: No   • Sexual activity: Defer        Past Medical History:   Diagnosis Date   • Arrhythmia    • Arthritis     fingers, arm,hands   • CHF (congestive heart failure) ([facility])    • COPD (chronic obstructive pulmonary disease) ([facility])    • Dermatitis     arm and leg -topical cream BID    • Disease of thyroid gland     hypothyroidism-recently started on meds 15   • Dyspnea    • History of atrial flutter    • History of transfusion     with PFO at age 6   • Hyperlipidemia    • Low back pain    • Lung disease    • On home oxygen therapy     2 liters at night    • Pacemaker at end of battery life     medtronic    • Patent foramen ovale     surgery at 6 years old    • Presence of dental prosthetic device     implants    • Wears glasses         Immunization History   Administered Date(s) Administered   • COVID-19 (ZARINA) 2021   • Hepatitis A 2018, 2018   • Influenza, Unspecified 10/03/2018, 2019   • Pneumococcal Conjugate 13-Valent (PCV13) 2019   • Pneumococcal Polysaccharide (PPSV23) 2018   • Shingrix 2018   • Tdap 2018       Allergies   Allergen Reactions   • Tylenol Pm Extra Strength [Diphenhydramine-Acetaminophen] Unknown - High Severity   • Adenosine Nausea Only   • Codeine Nausea Only and Dizziness          Current Outpatient Medications:   •  albuterol (PROVENTIL) (2.5 MG/3ML) 0.083% nebulizer solution, Take 2.5 mg by nebulization Every 4 (Four) Hours As Needed., Disp: , Rfl:   •  albuterol sulfate  (90 Base) MCG/ACT inhaler, Inhale 2 puffs Every 6 (Six) Hours As Needed for Wheezing., Disp: 18 g, Rfl: 1  •  apixaban (Eliquis) 5 MG tablet  tablet, 5 mg Every 12 (Twelve) Hours., Disp: , Rfl:   •  arformoterol (BROVANA) 15 MCG/2ML nebulizer solution, Take  by nebulization 2 (Two) Times a Day., Disp: , Rfl:   •  budesonide (PULMICORT) 0.5 MG/2ML nebulizer solution, 2 mL., Disp: , Rfl:   •  Cholecalciferol (VITAMIN D3) 50 MCG (2000 UT) capsule, Take 2,000 Units by mouth Daily., Disp: , Rfl:   •  dofetilide (TIKOSYN) 500 MCG capsule, TAKE 1 CAPSULE TWICE A DAY, Disp: 180 capsule, Rfl: 3  •  furosemide (LASIX) 40 MG tablet, TAKE 1 TABLET DAILY, Disp: 90 tablet, Rfl: 1  •  levothyroxine (SYNTHROID, LEVOTHROID) 50 MCG tablet, TAKE 1 TABLET BY MOUTH EVERY MORNING on empty stomach **dose decrease**, Disp: 90 tablet, Rfl: 0  •  linaclotide (Linzess) 145 MCG capsule capsule, Take 1 capsule by mouth Every Morning Before Breakfast., Disp: 30 capsule, Rfl: 1  •  metoprolol tartrate (LOPRESSOR) 25 MG tablet, TAKE 1 TABLET DAILY, Disp: 90 tablet, Rfl: 1  •  O2 (OXYGEN), oxygen 2 L/NC per NC pt wears this at night   Active, Disp: , Rfl:   •  rosuvastatin (CRESTOR) 20 MG tablet, TAKE 1 TABLET AT BEDTIME, Disp: 90 tablet, Rfl: 3  •  spironolactone (ALDACTONE) 50 MG tablet, TAKE 1 TABLET BY MOUTH EVERY DAY, Disp: 90 tablet, Rfl: 0  •  temazepam (RESTORIL) 30 MG capsule, Take 1 capsule by mouth At Night As Needed for Sleep., Disp: 30 capsule, Rfl: 2  •  tiotropium bromide monohydrate (Spiriva Respimat) 2.5 MCG/ACT aerosol solution inhaler, Spiriva Respimat 2.5 mcg/actuation inhalation mist inhale 2 puffs (5 mcg) by inhalation route once daily at the same time each day   Active, Disp: , Rfl:   •  Zoster Vac Recomb Adjuvanted 50 MCG/0.5ML reconstituted suspension, Inject 0.5 mL into the appropriate muscle as directed by prescriber As Needed (once now then repeat in 2 months)., Disp: 1 each, Rfl: 1     Objective     Physical Exam  Vital Signs Reviewed  WDWN, Alert, NAD.    HEENT:  PERRL, EOMI.  OP, nares clear, no sinus tenderness  Neck:  Supple, no JVD, no  "thyromegaly.  Lymph: no axillary, cervical, supraclavicular lymphadenopathy noted bilaterally  Chest:  good aeration, clear to auscultation bilaterally, tympanic to percussion bilaterally, no work of breathing noted  CV: RRR, no MGR, pulses 2+, equal.  Abd:  Soft, NT, ND, + BS, no HSM  EXT:  no clubbing, no cyanosis, no edema, no joint tenderness  Neuro:  A&Ox3, CN grossly intact, no focal deficits.  Skin: No rashes or lesions noted.    Vital Signs:   /93   Pulse 95   Resp 14   Ht 160 cm (63\")   Wt 77.1 kg (170 lb)   SpO2 94%   BMI 30.11 kg/m²         Result Review :   I have personally reviewed Dr. Colmenares's last office visit note.  I also reviewed chest x-ray report from 11/1/2021.  See scanned report.         Assessment and Plan      Assessment:  1. Chronic dyspnea.  2. Moderate COPD without exacerbation.  3. Pulmonary hypertension secondary to congenital heart disease.  Last right heart cath in 2018 showed PA pressures of 20 and no indication to treat for PAH.  4. Atrial septal defect status post closure.  5. Multiple tachyarrhythmias.  6. Snoring.  7. Excessive daytime sleepiness.  8. Obstructive sleep apnea.   9.  Bronchitis.  Patient recently treated by PCP.  10. Tobacco use of cigarettes in remission.    Plan:  1.  Patient sleep study did in fact show obstructive sleep apnea recommending CPAP titration study.  Patient does not want titration study until January, 2022 due to be having hip surgery the week after Thanksgiving.  Patient is wanting this test completed in [place], [place].  Order placed today.  2.  Continue Spiriva, Brovana, and Pulmicort every day as prescribed and rinse mouth out after each use.  3.  Continue albuterol inhaler and albuterol nebulizer treatments as needed.  4. Diuretics and antiarrhythmics per cardiology.  Patient to follow-up with cardiology as scheduled.  5. Continue anticoagulation.  6. Vaccination status: Up-to-date with flu, Prevnar, Pneumovax, COVID-19 " vaccinations. Patient is advised to continue to follow CDC recommendations such as social distancing, wearing a mask, and washing hands for least 20 seconds.  7.  Patient to call the office, 911, or go to the ER with new or worsening symptoms.  8.  Follow-up with Dr. Colmenares in 3 months, sooner if needed.            Follow Up   Return in about 3 months (around 2/15/2022) for with Dr. Colmenares.  Patient was given instructions and counseling regarding her condition or for health maintenance advice. Please see specific information pulled into the AVS if appropriate.

## 2021-11-11 NOTE — PROGRESS NOTES
Chief Complaint  Fatigue (Follow-up not better. )    Subjective          Deb Harrison presents to [facility]  URI is getting better- pt completed meds from last visit  Pt has had fatigue and constipation intermittently over last couple weeks    Fatigue  This is a new problem. The current episode started 1 to 4 weeks ago. The problem occurs constantly. The problem has been gradually worsening. Associated symptoms include arthralgias, fatigue and headaches. Pertinent negatives include no abdominal pain, anorexia, change in bowel habit, chest pain, chills, congestion, coughing, diaphoresis, fever, joint swelling, myalgias, nausea, neck pain, numbness, rash, sore throat, vertigo, visual change, vomiting or weakness. Nothing aggravates the symptoms. She has tried nothing for the symptoms.       Objective   Allergies   Allergen Reactions   • Tylenol Pm Extra Strength [Diphenhydramine-Acetaminophen] Unknown - High Severity   • Adenosine Nausea Only   • Codeine Nausea Only and Dizziness     Immunization History   Administered Date(s) Administered   • COVID-19 (ZARINA) 03/05/2021   • Hepatitis A 05/01/2018, 11/06/2018   • Influenza, Unspecified 10/03/2018, 09/24/2019   • Pneumococcal Conjugate 13-Valent (PCV13) 12/02/2019   • Pneumococcal Polysaccharide (PPSV23) 09/24/2018   • Shingrix 12/14/2018   • Tdap 05/01/2018       Vital Signs:   Vitals:    11/11/21 1321   BP: 150/90   Pulse: 80   Temp: 96.9 °F (36.1 °C)   SpO2: 95%   Weight: 78.3 kg (172 lb 9.6 oz)       Physical Exam  Vitals reviewed.   Constitutional:       Appearance: Normal appearance. She is well-developed.   HENT:      Head: Normocephalic and atraumatic.      Right Ear: Tympanic membrane, ear canal and external ear normal.      Left Ear: Tympanic membrane, ear canal and external ear normal.      Nose: Nose normal.      Mouth/Throat:      Mouth: Mucous membranes are moist.      Pharynx: Oropharynx is clear. No  oropharyngeal exudate or posterior oropharyngeal erythema.   Eyes:      Extraocular Movements: Extraocular movements intact.      Conjunctiva/sclera: Conjunctivae normal.      Pupils: Pupils are equal, round, and reactive to light.   Cardiovascular:      Rate and Rhythm: Normal rate and regular rhythm.      Pulses: Normal pulses.      Heart sounds: Normal heart sounds. No murmur heard.  No friction rub. No gallop.    Pulmonary:      Effort: Pulmonary effort is normal.      Breath sounds: Normal breath sounds. No wheezing or rhonchi.   Abdominal:      General: Bowel sounds are normal. There is no distension.      Palpations: Abdomen is soft. There is no mass.      Tenderness: There is no abdominal tenderness. There is no guarding or rebound.      Hernia: No hernia is present.   Musculoskeletal:         General: Normal range of motion.      Cervical back: Normal range of motion and neck supple.   Skin:     General: Skin is warm and dry.      Capillary Refill: Capillary refill takes less than 2 seconds.   Neurological:      General: No focal deficit present.      Mental Status: She is alert and oriented to person, place, and time.      Cranial Nerves: No cranial nerve deficit.   Psychiatric:         Mood and Affect: Mood and affect normal.         Behavior: Behavior normal.         Thought Content: Thought content normal.         Judgment: Judgment normal.        Result Review :                 Assessment and Plan    Diagnoses and all orders for this visit:    1. Fatigue, unspecified type (Primary)  -     CBC Auto Differential  -     Comprehensive Metabolic Panel  -     Magnesium  -     TSH+Free T4  -     Vitamin B12 & Folate  -     EBV Antibody Profile    2. Constipation, unspecified constipation type  -     Comprehensive Metabolic Panel  -     Magnesium  -     TSH+Free T4  -     Vitamin B12 & Folate  -     EBV Antibody Profile  -     linaclotide (Linzess) 145 MCG capsule capsule; Take 1 capsule by mouth Every Morning  Before Breakfast.  Dispense: 30 capsule; Refill: 1            Follow Up   No follow-ups on file.  Patient was given instructions and counseling regarding her condition or for health maintenance advice. Please see specific information pulled into the AVS if appropriate.

## 2021-11-11 NOTE — PROGRESS NOTES
Venipuncture Blood Specimen Collection  Venipuncture performed in left arm  by Colette Wang with good hemostasis. Patient tolerated the procedure well without complications.   11/11/21   Colette Wang

## 2021-11-15 PROBLEM — R06.09 CHRONIC DYSPNEA: Status: ACTIVE | Noted: 2021-01-01

## 2021-11-15 PROBLEM — E66.9 OBESITY (BMI 30-39.9): Status: ACTIVE | Noted: 2021-01-01

## 2021-11-15 PROBLEM — J44.9 CHRONIC OBSTRUCTIVE PULMONARY DISEASE (HCC): Status: ACTIVE | Noted: 2021-01-01

## 2021-11-15 PROBLEM — R06.83 SNORING: Status: ACTIVE | Noted: 2021-01-01

## 2021-11-15 PROBLEM — J40 BRONCHITIS: Status: ACTIVE | Noted: 2021-01-01

## 2021-11-15 PROBLEM — R00.0 TACHYARRHYTHMIA: Status: ACTIVE | Noted: 2021-01-01

## 2021-11-15 PROBLEM — G47.19 EXCESSIVE DAYTIME SLEEPINESS: Status: ACTIVE | Noted: 2021-01-01

## 2021-11-15 PROBLEM — F17.201 TOBACCO ABUSE, IN REMISSION: Status: ACTIVE | Noted: 2021-01-01

## 2021-11-16 NOTE — TELEPHONE ENCOUNTER
Phoned patient to remind her that her pacemaker reading is due tomorrow. She was thankful for the reminder and said she would send it in.

## 2021-11-18 PROBLEM — E11.9 DIET-CONTROLLED DIABETES MELLITUS (HCC): Status: ACTIVE | Noted: 2021-01-01

## 2021-11-18 NOTE — PROGRESS NOTES
Venipuncture Blood Specimen Collection  Venipuncture performed in left hand  by Colette Wang with good hemostasis. Patient tolerated the procedure with difficulty.   11/18/21   Colette Wang

## 2021-11-18 NOTE — PROGRESS NOTES
Chief Complaint  Hypertension (refills on meds), Hypothyroidism, and Insomnia    Subjective            Deb Harrison presents to [facility]  Pt here for the HTN and thyroid and insomnia--meds refills today    Pt already had her flu shot and her booster on the [facility]     Pt supposed to have the left hip surgery day after thanks giving--next week--    Pt concerned with regards to her Rx's running out at that time--    _______________________________    Pt would like to Discuss the labs Dr Mendieta did and the PAT did as well--    A1C=6.8%--pt was prediabetic range 4 yrs ago then normal since then and now consistent with diabetes-but goal is to be <7%--pt reports does not eat a lot of sweets--but does like some carbs--and we discussed     CBC: shows the Hemoglobin and hematocrit and RBC were modestly elevated     Sodium was slight borderline low at 135 normal is 136     Bili was slightly 0.1 eleavted           PHQ-2 Total Score:    PHQ-9 Total Score:      Past Medical History:   Diagnosis Date   • Arrhythmia    • Arthritis     fingers, arm,hands   • CHF (congestive heart failure) ([facility])    • COPD (chronic obstructive pulmonary disease) ([facility])    • Dermatitis     arm and leg -topical cream BID    • Disease of thyroid gland     hypothyroidism-recently started on meds 2/25/17   • Dyspnea    • Fungal infection of lung    • History of atrial flutter    • History of transfusion     with PFO at age 6   • Hyperlipidemia    • Low back pain    • Lung disease    • On home oxygen therapy     2 liters at night    • Pacemaker     medtronic   • Pacemaker at end of battery life     medtronic    • Patent foramen ovale     surgery at 6 years old    • Presence of dental prosthetic device     implants    • Sleep apnea    • Wears glasses        Allergies   Allergen Reactions   • Tylenol Pm Extra Strength [Diphenhydramine-Acetaminophen] Unknown - High Severity     Pt does not recall having any  reaction, is able to take tylenol with no issues.  States her cardiologist told her to no longer take it & to list is on her allergies.   • Adenosine Nausea Only   • Codeine Nausea Only and Dizziness        Past Surgical History:   Procedure Laterality Date   • CARDIAC CATHETERIZATION Bilateral 2018    Procedure: Right and Left Heart Cath;  Surgeon: Mark Carrasco IV, MD;  Location:  [facility];  Service: Cardiovascular   • CARDIAC ELECTROPHYSIOLOGY PROCEDURE N/A 3/2/2017    Procedure: PPM generator change - dual;  Surgeon: Manuel Tavarez MD;  Location:  [facility] EP INVASIVE LOCATION;  Service:    • CATARACT EXTRACTION     • COLONOSCOPY     • DENTAL PROCEDURE     • EP STUDY      ablation -several times for atrial flutter/atrial fib    • HERNIA REPAIR     • HYSTERECTOMY     • JOINT REPLACEMENT Right     hip replacement    • PACEMAKER IMPLANTATION     • PACEMAKER REPLACEMENT     • PATENT FORAMEN OVALE CLOSURE      age 6        Social History     Tobacco Use   • Smoking status: Former Smoker     Packs/day: 1.00     Years: 30.00     Pack years: 30.00     Types: Cigarettes     Quit date:      Years since quittin.8   • Smokeless tobacco: Never Used   • Tobacco comment: quit in    Vaping Use   • Vaping Use: Never used   Substance Use Topics   • Alcohol use: No   • Drug use: No       Family History   Problem Relation Age of Onset   • No Known Problems Mother    • No Known Problems Father         Health Maintenance Due   Topic Date Due   • PAP SMEAR  Never done   • ZOSTER VACCINE (2 of 2) 2019   • DXA SCAN  01/15/2021   • COVID-19 Vaccine (2 - Booster for Krunal series) 2021   • INFLUENZA VACCINE  2021        Current Outpatient Medications on File Prior to Visit   Medication Sig   • albuterol (PROVENTIL) (2.5 MG/3ML) 0.083% nebulizer solution Take 2.5 mg by nebulization Every 4 (Four) Hours As Needed.   • albuterol sulfate  (90 Base) MCG/ACT inhaler  Inhale 2 puffs Every 6 (Six) Hours As Needed for Wheezing.   • apixaban (Eliquis) 5 MG tablet tablet Take 5 mg by mouth Every 12 (Twelve) Hours. Pt to stop 2 days prior to procedure   • arformoterol (BROVANA) 15 MCG/2ML nebulizer solution Take 15 mcg by nebulization 2 (Two) Times a Day.   • budesonide (PULMICORT) 0.5 MG/2ML nebulizer solution Take 2 mL by nebulization Daily.   • Cholecalciferol (VITAMIN D3) 50 MCG (2000 UT) capsule Take 2,000 Units by mouth Daily.   • dofetilide (TIKOSYN) 500 MCG capsule TAKE 1 CAPSULE TWICE A DAY (Patient taking differently: Take 500 mcg by mouth 2 (Two) Times a Day.)   • furosemide (LASIX) 40 MG tablet TAKE 1 TABLET DAILY (Patient taking differently: Take 40 mg by mouth Daily.)   • linaclotide (Linzess) 145 MCG capsule capsule Take 1 capsule by mouth Every Morning Before Breakfast.   • metoprolol tartrate (LOPRESSOR) 25 MG tablet TAKE 1 TABLET DAILY (Patient taking differently: Take 25 mg by mouth Daily.)   • O2 (OXYGEN) Inhale 2 L/min Every Night. 2L at night   • rosuvastatin (CRESTOR) 20 MG tablet TAKE 1 TABLET AT BEDTIME (Patient taking differently: Take 20 mg by mouth Every Night.)   • spironolactone (ALDACTONE) 50 MG tablet TAKE 1 TABLET BY MOUTH EVERY DAY (Patient taking differently: Take 50 mg by mouth Daily.)   • tiotropium bromide monohydrate (Spiriva Respimat) 2.5 MCG/ACT aerosol solution inhaler Inhale 2 puffs Daily.   • [DISCONTINUED] levothyroxine (SYNTHROID, LEVOTHROID) 50 MCG tablet TAKE 1 TABLET BY MOUTH EVERY MORNING on empty stomach **dose decrease** (Patient taking differently: Take 50 mcg by mouth Every Morning.)   • [DISCONTINUED] temazepam (RESTORIL) 30 MG capsule Take 1 capsule by mouth At Night As Needed for Sleep.     No current facility-administered medications on file prior to visit.       Immunization History   Administered Date(s) Administered   • COVID-19 (ZARINA) 03/05/2021   • Hepatitis A 05/01/2018, 11/06/2018   • Influenza, Unspecified  "10/03/2018, 09/24/2019   • Pneumococcal Conjugate 13-Valent (PCV13) 12/02/2019   • Pneumococcal Polysaccharide (PPSV23) 09/24/2018   • Shingrix 12/14/2018   • Tdap 05/01/2018       Review of Systems   Constitutional: Positive for activity change.   HENT: Positive for postnasal drip.    Eyes: Negative.    Respiratory: Positive for apnea, shortness of breath and wheezing.         They did the JAYJAY testing and they Dx'd her and now she has to F/U to check the CPAP pressures    Gastrointestinal: Negative for abdominal pain and blood in stool.   Genitourinary: Negative for dysuria.   Musculoskeletal: Positive for arthralgias.        Hips   Allergic/Immunologic: Positive for environmental allergies.   Neurological: Negative.    Hematological: Negative.    Psychiatric/Behavioral: Positive for sleep disturbance. Negative for agitation, behavioral problems, decreased concentration, dysphoric mood, hallucinations, self-injury, suicidal ideas, negative for hyperactivity, depressed mood and stress. The patient is not nervous/anxious.         Objective     /72   Pulse 77   Temp 97.5 °F (36.4 °C)   Ht 158.8 cm (62.5\")   Wt 78 kg (172 lb)   SpO2 94%   BMI 30.96 kg/m²       Physical Exam  Vitals and nursing note reviewed.   Constitutional:       Appearance: Normal appearance.   HENT:      Head: Normocephalic.      Right Ear: External ear normal.      Nose: Nose normal.      Mouth/Throat:      Comments: Wearing mask  Eyes:      Pupils: Pupils are equal, round, and reactive to light.   Cardiovascular:      Rate and Rhythm: Normal rate and regular rhythm.      Heart sounds: Murmur heard.       Pulmonary:      Effort: Pulmonary effort is normal.      Breath sounds: Normal breath sounds.   Abdominal:      General: Bowel sounds are normal.      Palpations: Abdomen is soft.      Tenderness: There is no abdominal tenderness.   Musculoskeletal:         General: Tenderness present.      Cervical back: Normal range of motion and " neck supple.      Right lower leg: No edema.      Left lower leg: No edema.      Comments: Left hip and limited ROM   Skin:     General: Skin is warm and dry.   Neurological:      Mental Status: She is alert and oriented to person, place, and time.   Psychiatric:         Mood and Affect: Mood normal.         Behavior: Behavior normal.         Judgment: Judgment normal.         Result Review :     The following data was reviewed by: NIRU Boston on 11/18/2021:      CBC Auto Differential (11/11/2021 14:05)  Comprehensive Metabolic Panel (11/11/2021 14:05)  Magnesium (11/11/2021 14:05)  TSH+Free T4 (11/11/2021 14:05)  Vitamin B12 & Folate (11/11/2021 14:05)  Hemoglobin A1c (11/16/2021 10:51)  Protime-INR (11/16/2021 10:51)           Assessment and Plan      Diagnoses and all orders for this visit:    1. Hypothyroidism, unspecified type (Primary)  -     levothyroxine (SYNTHROID, LEVOTHROID) 50 MCG tablet; Take 1 tablet by mouth Every Morning.  Dispense: 90 tablet; Refill: 1    2. Diet-controlled diabetes mellitus (HCC)  Comments:  A1C 6.8% well controlled we will recheck in 3 months     3. Other insomnia  -     temazepam (RESTORIL) 30 MG capsule; Take 1 capsule by mouth At Night As Needed for Sleep.  Dispense: 30 capsule; Refill: 2    4. Elevated laboratory test result  Comments:  D/W pt all the labs and the main thing was the A1C and for her to stay well hydrated and her diet and as much exercise as her lungs allows     I printed the Rx's out and also added to the restoril Rx not to be filled prior to 11/25/21    Pt will be F/U with sleep specialist clinic and then also if she desires to see a dietician I can do the referral          Follow Up     Return in about 3 months (around 2/18/2022), or if symptoms worsen or fail to improve, for A1C and refill .

## 2021-11-26 NOTE — ANESTHESIA PREPROCEDURE EVALUATION
Anesthesia Evaluation     Patient summary reviewed and Nursing notes reviewed   no history of anesthetic complications:  NPO Solid Status: > 8 hours  NPO Liquid Status: > 2 hours           Airway   Mallampati: III  TM distance: >3 FB  Neck ROM: full  No difficulty expected  Dental      Pulmonary - normal exam    breath sounds clear to auscultation  (+) COPD, shortness of breath, sleep apnea,   Cardiovascular   Exercise tolerance: good (4-7 METS)    Rhythm: regular  Rate: abnormal    (+) dysrhythmias Atrial Fib, CHF , murmur, hyperlipidemia,       Neuro/Psych- negative ROS  GI/Hepatic/Renal/Endo    (+) obesity,   diabetes mellitus type 2,     Musculoskeletal     Abdominal    Substance History - negative use     OB/GYN negative ob/gyn ROS         Other   arthritis,          Phys Exam Other: Regular rhythmn with occasional missed beat       Normal Device Function  Events or Alerts: 0  Battery: OK, 5.67 yrs  Sensing, impedance and thresholds reviewed  Programmed parameters reviewed  Presenting rhythm reviewed  Heart Rate Histograms reviewed    - ABNORMAL ECG -  A-V dual-paced rhythm with some inhibition  No further analysis attempted due to paced rhythm         Anesthesia Plan    ASA 3     general   (Patient understands anesthesia not responsible for dental damage.  Regional anesthesia options discussed; patient contraindicated regional block since only been off eliquis for 60 hours, will proceed with general anesthesia.    PT IS PACEMAKER DEPENDENT)  intravenous induction     Anesthetic plan, all risks, benefits, and alternatives have been provided, discussed and informed consent has been obtained with: patient.  Use of blood products discussed with patient .   Plan discussed with CRNA.

## 2021-11-26 NOTE — ANESTHESIA POSTPROCEDURE EVALUATION
Patient: Deb Harrison    Procedure Summary     Date: 11/26/21 Room / Location: [facility] OR 01 / [facility] MAIN OR    Anesthesia Start: 0923 Anesthesia Stop: 1053    Procedure: TOTAL HIP ARTHROPLASTY ANTERIOR left (Left Hip) Diagnosis:       Primary osteoarthritis of left hip      (Primary osteoarthritis of left hip [M16.12])    Surgeons: Lainey Blake MD Provider: Rick Austin MD    Anesthesia Type: general ASA Status: 3          Anesthesia Type: general    Vitals  Vitals Value Taken Time   BP 96/74 11/26/21 1112   Temp     Pulse 76 11/26/21 1114   Resp     SpO2 93 % 11/26/21 1114   Vitals shown include unvalidated device data.        Post Anesthesia Care and Evaluation    Patient location during evaluation: bedside  Patient participation: complete - patient participated  Level of consciousness: awake and alert  Pain management: adequate  Airway patency: patent  Anesthetic complications: No anesthetic complications  PONV Status: none  Cardiovascular status: acceptable  Respiratory status: acceptable  Hydration status: acceptable

## 2021-12-04 NOTE — ED PROVIDER NOTES
Time: 10:55 PM EST  Arrived by: private car  Chief Complaint: Left lower leg pain.    History of Present Illness:  Patient is a 66 y.o. year old female that presents to the emergency department with left lower leg pain    Patient was hospitalized 1 week ago for left hip replacement, due to osteoarthritis.  She describes a painful swelling over her left lower anterior leg.  She denies tenderness at this location.  States she has had increasing pain over her entire left leg.  She denies any pain or drainage from her left hip surgical site.  She describes shortness of breath although this is chronic.  She denies any dysuria or frequency.  States she has been constipated and has not had a bowel movement in 1 week.  She has tried multiple laxatives but no improvement.  She denies fevers or chills at home.  No sick contacts.      History provided by:  Patient and spouse      Similar Symptoms Previously: No  Recently seen: Yes      Patient Care Team  Primary Care Provider: Siri Bain APRN    Past Medical History:     Allergies   Allergen Reactions   • Morphine Nausea And Vomiting   • Tylenol Pm Extra Strength [Diphenhydramine-Acetaminophen] Unknown - High Severity     Pt does not recall having any reaction, is able to take tylenol with no issues.  States her cardiologist told her to no longer take it & to list is on her allergies.   • Adenosine Nausea Only   • Codeine Nausea Only and Dizziness     Past Medical History:   Diagnosis Date   • Arrhythmia    • Arthritis     fingers, arm,hands   • CHF (congestive heart failure) ([facility])    • COPD (chronic obstructive pulmonary disease) ([facility])    • Dermatitis     arm and leg -topical cream BID    • Disease of thyroid gland     hypothyroidism-recently started on meds 2/25/17   • Dyspnea    • Fungal infection of lung    • History of atrial flutter    • History of transfusion     with PFO at age 6   • Hyperlipidemia    • Low back pain    • Lung disease    • On home oxygen  therapy     2 liters at night    • Pacemaker     medtronic   • Pacemaker at end of battery life     medtronic    • Patent foramen ovale     surgery at 6 years old    • Presence of dental prosthetic device     implants    • Sleep apnea    • Wears glasses      Past Surgical History:   Procedure Laterality Date   • CARDIAC CATHETERIZATION Bilateral 8/29/2018    Procedure: Right and Left Heart Cath;  Surgeon: Mark Carrasco IV, MD;  Location:  [facility];  Service: Cardiovascular   • CARDIAC ELECTROPHYSIOLOGY PROCEDURE N/A 3/2/2017    Procedure: PPM generator change - dual;  Surgeon: Manuel Tavarez MD;  Location:  [facility] EP INVASIVE [facility];  Service:    • CATARACT EXTRACTION     • COLONOSCOPY     • DENTAL PROCEDURE     • EP STUDY      ablation -several times for atrial flutter/atrial fib    • HERNIA REPAIR     • HYSTERECTOMY     • JOINT REPLACEMENT Right     hip replacement    • PACEMAKER IMPLANTATION     • PACEMAKER REPLACEMENT     • PATENT FORAMEN OVALE CLOSURE      age 6   • TOTAL HIP ARTHROPLASTY Left 11/26/2021    Procedure: TOTAL HIP ARTHROPLASTY ANTERIOR left;  Surgeon: Lainey Blake MD;  Location: [facility] MAIN OR;  Service: Orthopedics;  Laterality: Left;     Family History   Problem Relation Age of Onset   • No Known Problems Mother    • No Known Problems Father        Home Medications:  Prior to Admission medications    Medication Sig Start Date End Date Taking? Authorizing Provider   albuterol (PROVENTIL) (2.5 MG/3ML) 0.083% nebulizer solution Take 2.5 mg by nebulization Every 4 (Four) Hours As Needed.   Yes Sophia Andrade MD   albuterol sulfate  (90 Base) MCG/ACT inhaler Inhale 2 puffs Every 6 (Six) Hours As Needed for Wheezing. 11/1/21  Yes Nazario Mendieta MD   arformoterol (BROVANA) 15 MCG/2ML nebulizer solution Take 15 mcg by nebulization 2 (Two) Times a Day.   Yes Sophia Andrade MD   budesonide (PULMICORT) 0.5 MG/2ML nebulizer solution Take 2  mL by nebulization Daily.   Yes Sophia Andrade MD   Cholecalciferol (VITAMIN D3) 50 MCG (2000 UT) capsule Take 2,000 Units by mouth Daily.   Yes Sophia Andrade MD   dofetilide (TIKOSYN) 500 MCG capsule TAKE 1 CAPSULE TWICE A DAY  Patient taking differently: Take 500 mcg by mouth 2 (Two) Times a Day. 11/8/21  Yes Chico Najera DO   Eliquis 5 MG tablet tablet TAKE 1 TABLET TWICE A DAY 11/19/21  Yes Manuel Tavarez MD   furosemide (LASIX) 40 MG tablet TAKE 1 TABLET DAILY  Patient taking differently: Take 40 mg by mouth Daily. 7/26/21  Yes Ying Sy APRN   HYDROcodone-acetaminophen (Norco) 7.5-325 MG per tablet Take 1-2 tablets by mouth Every 4 (Four) Hours As Needed for Moderate Pain . 12/2/21  Yes Lainey Blake MD   levothyroxine (SYNTHROID, LEVOTHROID) 50 MCG tablet Take 1 tablet by mouth Every Morning. 11/18/21  Yes Siri Bain APRN   linaclotide (Linzess) 145 MCG capsule capsule Take 1 capsule by mouth Every Morning Before Breakfast. 11/11/21  Yes Nazario Mendieta MD   metoprolol tartrate (LOPRESSOR) 25 MG tablet TAKE 1 TABLET DAILY  Patient taking differently: Take 25 mg by mouth Every Night. Pt takes med nightly at 2200 8/31/21  Yes Manuel Tavarez MD   O2 (OXYGEN) Inhale 2 L/min Every Night. 2L at night   Yes Sophia Andrade MD   rosuvastatin (CRESTOR) 20 MG tablet TAKE 1 TABLET AT BEDTIME  Patient taking differently: Take 20 mg by mouth Every Night. 8/19/21  Yes Siri Bain APRN   spironolactone (ALDACTONE) 50 MG tablet TAKE 1 TABLET BY MOUTH EVERY DAY  Patient taking differently: Take 50 mg by mouth Daily. 11/10/21  Yes Manuel Tavarez MD   temazepam (RESTORIL) 30 MG capsule Take 1 capsule by mouth At Night As Needed for Sleep. 11/18/21  Yes Siri Bain APRN   tiotropium bromide monohydrate (Spiriva Respimat) 2.5 MCG/ACT aerosol solution inhaler Inhale 2 puffs Daily.   Yes Provider, Historical, MD        Social History:   Social History  "    Tobacco Use   • Smoking status: Former Smoker     Packs/day: 1.00     Years: 30.00     Pack years: 30.00     Types: Cigarettes     Quit date:      Years since quittin.9   • Smokeless tobacco: Never Used   • Tobacco comment: quit in    Vaping Use   • Vaping Use: Never used   Substance Use Topics   • Alcohol use: No   • Drug use: No       Record Review:  I have reviewed the patient's records in [facility].     Review of Systems:  Review of Systems   Constitutional: Negative for chills and fever.   HENT: Negative for congestion, ear pain and sore throat.    Eyes: Negative for pain.   Respiratory: Negative for cough, chest tightness and shortness of breath.    Cardiovascular: Negative for chest pain.   Gastrointestinal: Positive for constipation. Negative for abdominal pain, diarrhea, nausea and vomiting.   Genitourinary: Negative for flank pain and hematuria.   Musculoskeletal: Positive for arthralgias, gait problem and myalgias. Negative for joint swelling.   Skin: Negative for pallor.   Neurological: Negative for seizures and headaches.   All other systems reviewed and are negative.       Physical Exam:  /75 (BP Location: Right arm, Patient Position: Lying)   Pulse 99   Temp 98.9 °F (37.2 °C) (Oral)   Resp 26   Ht 160 cm (63\")   Wt 81.6 kg (179 lb 14.3 oz)   LMP  (LMP Unknown)   SpO2 92%   BMI 31.87 kg/m²     Physical Exam  Vitals and nursing note reviewed.   Constitutional:       General: She is not in acute distress.     Appearance: Normal appearance. She is not ill-appearing or toxic-appearing.   HENT:      Head: Normocephalic and atraumatic.      Jaw: There is normal jaw occlusion.   Eyes:      General: Lids are normal.      Extraocular Movements: Extraocular movements intact.      Conjunctiva/sclera: Conjunctivae normal.      Pupils: Pupils are equal, round, and reactive to light.   Cardiovascular:      Rate and Rhythm: Normal rate and regular rhythm.      Pulses: Normal pulses.      " Heart sounds: Normal heart sounds.   Pulmonary:      Effort: Pulmonary effort is normal. No respiratory distress.      Breath sounds: Normal breath sounds. No wheezing or rhonchi.   Abdominal:      General: Abdomen is flat.      Palpations: Abdomen is soft.      Tenderness: There is no abdominal tenderness. There is no guarding or rebound.   Musculoskeletal:         General: Normal range of motion.      Cervical back: Normal range of motion and neck supple.      Right lower leg: No edema.      Left lower leg: No edema.      Comments: Patient left hip with reduced range of motion due to discomfort.  Her anterior hip approach surgical site appears clean dry and intact with no tenderness or drainage.   Skin:     General: Skin is warm and dry.      Comments: Patient has a small quarter size swelling on her left lower anterior leg.  There is no erythema tenderness or induration.  It is mildly fluctuant in character.   Neurological:      Mental Status: She is alert and oriented to person, place, and time. Mental status is at baseline.   Psychiatric:         Mood and Affect: Mood normal.                Medications in the Emergency Department:  Medications   acetaminophen (TYLENOL) tablet 975 mg (975 mg Oral Given 12/3/21 5984)        Labs  Lab Results (last 24 hours)     ** No results found for the last 24 hours. **           Imaging:  No Radiology Exams Resulted Within Past 24 Hours    Procedures:  Procedures    Progress  ED Course as of 12/05/21 0737   Fri Dec 03, 2021   2324 Vascular  reports DVT study is negative. [JS]   Sun Dec 05, 2021   0736 Urobilinogen, UA: 1.0 E.U./dL [JS]      ED Course User Index  [JS] Kenney Chatman MD                            Medical Decision Making:  [facility]   DVT study report is negative.  The patient´s CMP was reviewed and shows no abnormalities of critical concern. Of note, the patient´s sodium and potassium are acceptable. The patient´s liver enzymes are unremarkable. The  patient´s renal function (creatinine) is preserved. The patient has a normal anion gap the patient´s CBC was reviewed and shows no abnormalities of critical concern. Of note, there is no anemia requiring a blood transfusion and the platelet count is acceptable.  Urinalysis negative for evidence of infection.  Chest x-ray shows bilateral infiltrate consistent with possible COVID-19.  COVID-19 pending at this time.      Final diagnoses:   Fever, unspecified fever cause   Suspected COVID-19 virus infection        Disposition:  ED Disposition     ED Disposition Condition Comment    Discharge Stable             Portions of this medical record may have been created using voice recognition software.    Documentation assistance provided by Kenney Chatman MD acting as scribe for Kenney Chatman MD. Information recorded by the scribe was done at my direction and has been verified and validated by me.        Kenney Chatman MD  12/05/21 2917

## 2021-12-06 NOTE — TELEPHONE ENCOUNTER
SRINIVAS TRANS. ME A CALL ON PT PT STATED SHE WENT TO ER THE OTHER NIGHT THEY DID A DOPLER SHE HAD A KNOT ON HER CALF THE SIZE OF A GOLF BALL THE PT LEG WAS SWELLING AND RED DOPPLER SHOWED NOTHING . NURSE ALEXA SAID TO TELL THE PT IF SHES HAVING COMPLICATIONS LIKE THAT SHE NEEDED TO GO BACK TO ER THAT IT COULD HAVE SHOWN NEG BLOOD CLOT THE OTHER DAY. THERE WAS NOTHING WE COULD DO IN OFFICE ADVISED PT SHE NEEDED TO GO BACK TO THE ER PT ASKED IF SHE SHOULD DO THERAPY TODAY I TOLD HER NO NOT TO DO ANY THERAPY TODAY SHE NEEDED TO GO TO [facility] ER.

## 2021-12-06 NOTE — ED PROVIDER NOTES
Subjective   10 days ago patient had a left total hip replacement surgery here by Dr. Lainey Blake.  She has been recovering well and tolerating her physical therapy however on Friday she developed a wound on her left lower leg and came to the emergency department to rule out a blood clot.  She does take Eliquis.  She was told that it could be cellulitis but did not get any antibiotics and stated if it turned red to come back again.  She states that her leg has continued to swell and the area of concern has turned pink so she came in to be evaluated.      History provided by:  Patient   used: No        Review of Systems   Constitutional: Negative for chills and fever.   HENT: Negative for congestion, ear pain, rhinorrhea and sore throat.    Eyes: Negative for pain.   Respiratory: Negative for cough and shortness of breath.    Cardiovascular: Positive for leg swelling (Left leg swelling). Negative for chest pain.   Gastrointestinal: Negative for abdominal pain, diarrhea, nausea and vomiting.   Genitourinary: Negative for decreased urine volume, dysuria and flank pain.   Musculoskeletal: Positive for gait problem (Due to pain of left leg). Negative for arthralgias and myalgias.   Skin: Positive for color change (Lower lateral left leg area of redness) and wound (Lower left lateral leg). Negative for rash.        Healing surgical wound on left hip   Neurological: Negative for seizures and headaches.   All other systems reviewed and are negative.      Past Medical History:   Diagnosis Date   • Arrhythmia    • Arthritis     fingers, arm,hands   • CHF (congestive heart failure) (Formerly [facility])    • COPD (chronic obstructive pulmonary disease) (Formerly [facility])    • Dermatitis     arm and leg -topical cream BID    • Disease of thyroid gland     hypothyroidism-recently started on meds 2/25/17   • Dyspnea    • Fungal infection of lung    • History of atrial flutter    • History of transfusion     with PFO at age 6   •  Hyperlipidemia    • Low back pain    • Lung disease    • On home oxygen therapy     2 liters at night    • Pacemaker     medtronic   • Pacemaker at end of battery life     medtronic    • Patent foramen ovale     surgery at 6 years old    • Presence of dental prosthetic device     implants    • Sleep apnea    • Wears glasses        Allergies   Allergen Reactions   • Morphine Nausea And Vomiting   • Tylenol Pm Extra Strength [Diphenhydramine-Acetaminophen] Unknown - High Severity     Pt does not recall having any reaction, is able to take tylenol with no issues.  States her cardiologist told her to no longer take it & to list is on her allergies.   • Adenosine Nausea Only   • Codeine Nausea Only and Dizziness       Past Surgical History:   Procedure Laterality Date   • CARDIAC CATHETERIZATION Bilateral 8/29/2018    Procedure: Right and Left Heart Cath;  Surgeon: Mark Carrasco IV, MD;  Location: [facility] CATH INVASIVE LOCATION;  Service: Cardiovascular   • CARDIAC ELECTROPHYSIOLOGY PROCEDURE N/A 3/2/2017    Procedure: PPM generator change - dual;  Surgeon: Manuel Tavarez MD;  Location: [facility] EP INVASIVE LOCATION;  Service:    • CATARACT EXTRACTION     • COLONOSCOPY     • DENTAL PROCEDURE     • EP STUDY      ablation -several times for atrial flutter/atrial fib    • HERNIA REPAIR     • HYSTERECTOMY     • JOINT REPLACEMENT Right     hip replacement    • PACEMAKER IMPLANTATION     • PACEMAKER REPLACEMENT     • PATENT FORAMEN OVALE CLOSURE      age 6   • TOTAL HIP ARTHROPLASTY Left 11/26/2021    Procedure: TOTAL HIP ARTHROPLASTY ANTERIOR left;  Surgeon: Lainey Blake MD;  Location: [facility] MAIN OR;  Service: Orthopedics;  Laterality: Left;       Family History   Problem Relation Age of Onset   • No Known Problems Mother    • No Known Problems Father        Social History     Socioeconomic History   • Marital status:    Tobacco Use   • Smoking status: Former Smoker     Packs/day: 1.00     Years: 30.00      Pack years: 30.00     Types: Cigarettes     Quit date:      Years since quittin.9   • Smokeless tobacco: Never Used   • Tobacco comment: quit in 2008   Vaping Use   • Vaping Use: Never used   Substance and Sexual Activity   • Alcohol use: No   • Drug use: No   • Sexual activity: Defer           Objective   Physical Exam  Vitals and nursing note reviewed.   Constitutional:       General: She is not in acute distress.     Appearance: Normal appearance. She is obese. She is not ill-appearing, toxic-appearing or diaphoretic.   HENT:      Head: Normocephalic and atraumatic.      Right Ear: External ear normal.      Left Ear: External ear normal.   Eyes:      General: No scleral icterus.     Conjunctiva/sclera: Conjunctivae normal.      Pupils: Pupils are equal, round, and reactive to light.   Cardiovascular:      Rate and Rhythm: Normal rate and regular rhythm.      Heart sounds: Normal heart sounds.   Pulmonary:      Effort: Pulmonary effort is normal. No respiratory distress.      Breath sounds: Normal breath sounds.   Abdominal:      General: Bowel sounds are normal. There is no distension.      Palpations: Abdomen is soft.      Tenderness: There is no abdominal tenderness.   Musculoskeletal:         General: Swelling (Left leg edema) and tenderness (Left leg) present. No deformity or signs of injury. Normal range of motion.      Cervical back: Normal range of motion and neck supple.      Left lower leg: Edema present.   Skin:     General: Skin is warm and dry.      Capillary Refill: Capillary refill takes less than 2 seconds.      Findings: Laceration (Surgical wound left hip) and lesion (Left lateral lower leg) present.             Comments: 1 inch round area of erythema on the left lower lateral leg with surrounding edema extending down to the foot and up past the knee.  Leg is warmer on palpation than the right.   Surgical incision over left anterior hip approximately 14 cm long with staples intact.   No signs of infection occluding erythema, swelling, purulent drainage, increased heat.  Scant amount of serous drainage on dressing.   Neurological:      General: No focal deficit present.      Mental Status: She is alert and oriented to person, place, and time.   Psychiatric:         Mood and Affect: Mood normal.         Behavior: Behavior normal.         Procedures           ED Course                                                 MDM  Number of Diagnoses or Management Options  Cellulitis of left lower extremity: new and requires workup  S/P hip replacement, left: new and requires workup     Amount and/or Complexity of Data Reviewed  Clinical lab tests: reviewed        Final diagnoses:   Cellulitis of left lower extremity   S/P hip replacement, left       ED Disposition  ED Disposition     ED Disposition Condition Comment    Discharge Stable           Siri Bain, APRN  [address] [place] [address] [phone]      As needed    Lainey Blake MD  [address] [place] [address] [phone]    On 12/10/2021  As scheduled         Medication List      New Prescriptions    cephalexin 500 MG capsule  Commonly known as: KEFLEX  Take 1 capsule by mouth 4 (Four) Times a Day.        Changed    metoprolol tartrate 25 MG tablet  Commonly known as: LOPRESSOR  TAKE 1 TABLET DAILY  What changed:   · when to take this  · additional instructions     rosuvastatin 20 MG tablet  Commonly known as: CRESTOR  TAKE 1 TABLET AT BEDTIME  What changed: when to take this           Where to Get Your Medications      These medications were sent to [facility] - [place], [place] - [address] [address] - [phone]  - [phone]   [id number] [place] [id number]    Phone: [phone]   · cephalexin 500 MG capsule          Eneida Kelley APRN  12/06/21 1957

## 2021-12-06 NOTE — DISCHARGE INSTRUCTIONS
Please contact Dr. Blake's office tomorrow to advise him of today's hospital visit and action taken.  Please inquire if he wants you to be seen sooner than your scheduled appointment on Friday and if he wants you to proceed with your physical therapy.

## 2021-12-10 PROBLEM — Z47.89 AFTERCARE FOLLOWING SURGERY OF THE MUSCULOSKELETAL SYSTEM: Status: ACTIVE | Noted: 2021-01-01

## 2021-12-10 NOTE — PROGRESS NOTES
"Chief Complaint  Follow-up of the Left Hip    Subjective          Deb Harrison presents to [facility] for follow-up on left KULDEEP performed by Dr. Blake 11/26/2021.  Patient states she has had a hard time following surgery.  She developed an infection to the lateral aspect of the calf and she is currently taking Keflex.  She has been to the ER last Friday and this Monday regarding calf pain, swelling and infection to the lower leg.  They did an ultrasound to rule out DVT which came back negative.  She states she still has some redness near the infection but is taking Keflex and this is gotten better.  She missed a few physical therapy visits due to to all of the above.  She plans to resume soon.  Presents today using a wheelchair for ambulation and she is accompanied by her .  She states she is using a walker at home to ambulate.    Objective   Allergies   Allergen Reactions   • Morphine Nausea And Vomiting   • Tylenol Pm Extra Strength [Diphenhydramine-Acetaminophen] Unknown - High Severity     Pt does not recall having any reaction, is able to take tylenol with no issues.  States her cardiologist told her to no longer take it & to list is on her allergies.   • Adenosine Nausea Only   • Codeine Nausea Only and Dizziness       Vital Signs:   Pulse 118   Ht 160 cm (63\")   Wt 78.9 kg (174 lb)   SpO2 95%   BMI 30.82 kg/m²       Physical Exam  Constitutional:       Appearance: Normal appearance. Patient is well-developed and normal weight.   HENT:      Head: Normocephalic.      Right Ear: Hearing and external ear normal.      Left Ear: Hearing and external ear normal.      Nose: Nose normal.   Eyes:      Conjunctiva/sclera: Conjunctivae normal.   Cardiovascular:      Rate and Rhythm: Normal rate.   Pulmonary:      Effort: Pulmonary effort is normal.      Breath sounds: No wheezing or rales.   Abdominal:      Palpations: Abdomen is soft.      Tenderness: There is no abdominal " tenderness.   Musculoskeletal:      Cervical back: Normal range of motion.   Skin:     Findings: No rash.   Neurological:      Mental Status: Patient is alert and oriented to person, place, and time.   Psychiatric:         Mood and Affect: Mood and affect normal.         Judgment: Judgment normal.     Ortho Exam  Left hip: Well-healing surgical incision without erythema dehiscence or purulent drainage, tenderness and swelling of the hip and lower extremity is noted, there is calf pain with palpation, negative Homans' sign, no palpable venous cords.  4 cm area of erythema noted to the lateral calf with warmth, blanchable.  Good range of motion left knee ankle and digits.  Gait not tested as patient is in wheelchair today.  Muscular atrophy is noted in the left quadriceps.  Limited hip extension due to weakness.  Good abduction.  No pain with internal or external rotation.  Result Review :            Imaging Results (Most Recent)     Procedure Component Value Units Date/Time    XR Hip With or Without Pelvis 2 - 3 View Left [[id number]] Resulted: 12/10/21 1043     Updated: 12/10/21 1044    Narrative:      X-Ray Report:  Study: X-rays ordered, taken in the office, and reviewed today  Site: Left hip xray  Indication: Pain  View: AP and Lateral view(s)  Findings: Hardware intact from left KULDEEP, one retained staple is noted into   the wound, no acute soft tissue or osseous abnormalities are noted.  Prior studies available for comparison: yes                   Assessment and Plan    Problem List Items Addressed This Visit        Musculoskeletal and Injuries    Aftercare following surgery of the left total hip arthroplasty    Current Assessment & Plan     Staples removed, wound care discussed.  X-rays taken and reviewed.  Retained staple noted on x-ray which was removed prior to patient departure.  Recommend resting icing and elevating.  Continue PT and home exercises.  Tylenol and ibuprofen for pain.  Continue Keflex for  lower leg infection.  If you develop worsening calf pain or swelling, color change to the extremities or worsening signs of infection please call our office for further evaluation and treatment.  If you develop chest pain or shortness of air please go to the ER immediately.  Follow-up in 4 weeks with no x-ray at that time.           Other Visit Diagnoses     Left hip pain    -  Primary    Relevant Orders    XR Hip With or Without Pelvis 2 - 3 View Left (Completed)          Follow Up   Return in about 4 weeks (around 1/7/2022) for Recheck.  Patient Instructions   Staples removed, wound care discussed.  X-rays taken and reviewed.  Recommend resting icing and elevating.  Continue PT and home exercises.  Tylenol and ibuprofen for pain.  Continue Keflex for lower leg infection.  If you develop worsening calf pain or swelling, color change to the extremities or worsening signs of infection please call our office for further evaluation and treatment.  If you develop chest pain or shortness of air please go to the ER immediately.  Follow-up in 4 weeks with no x-ray at that time.    Patient was given instructions and counseling regarding her condition or for health maintenance advice. Please see specific information pulled into the AVS if appropriate.

## 2021-12-10 NOTE — ASSESSMENT & PLAN NOTE
Staples removed, wound care discussed.  X-rays taken and reviewed.  Retained staple noted on x-ray which was removed prior to patient departure.  Recommend resting icing and elevating.  Continue PT and home exercises.  Tylenol and ibuprofen for pain.  Continue Keflex for lower leg infection.  If you develop worsening calf pain or swelling, color change to the extremities or worsening signs of infection please call our office for further evaluation and treatment.  If you develop chest pain or shortness of air please go to the ER immediately.  Follow-up in 4 weeks with no x-ray at that time.

## 2021-12-10 NOTE — PATIENT INSTRUCTIONS
Staples removed, wound care discussed.  X-rays taken and reviewed.  Recommend resting icing and elevating.  Continue PT and home exercises.  Tylenol and ibuprofen for pain.  Continue Keflex for lower leg infection.  If you develop worsening calf pain or swelling, color change to the extremities or worsening signs of infection please call our office for further evaluation and treatment.  If you develop chest pain or shortness of air please go to the ER immediately.  Follow-up in 4 weeks with no x-ray at that time.

## 2021-12-16 PROBLEM — M51.36 DEGENERATION OF LUMBAR INTERVERTEBRAL DISC: Status: ACTIVE | Noted: 2021-01-01

## 2021-12-16 NOTE — PROGRESS NOTES
Chief Complaint  Hip Pain (f/u from hip surgery,left leg swollen,has had intervenous antibotic but still swollen and painful with a knot)    Subjective            Deb Harrison presents to [facility]  Pt here today for the F/U after having had left total hip replacement 11/26/21 Dr Blake--then approx 1 week later pt started developing swelling of the left leg (whole leg) then pt went to the ER dept an they did the venous doppler and no DVT--then mild redness and thought could be early cellulitis and no antibiotics were given-this was on 12/3/21 and surgery was 11/26/21--then on the monday 12/6/21 pt reports the are was deeply red and swelled and went back to the ER dept then they gave the IV antibiotics's and then was sent home on keflex--(pt also had pictures and the pictures show the worsened redness and swelling)     Pt reports F/U with Dr Blake's office ([facility]) 12/10/21 and she removed the sutures and then eval'd the redness and swelling of the lower leg and then they said to just F/U with her PCP     Pt here today 12/16/21 and she has 1 tab left an was given #40 to take 4 times daily x 10 days and pt reports not much better--still hurts and still swelled and still throbs and like she is shedding skin as well     They told the pt the actual surgical area loks good and that the lower part of the leg situation has nothing to do with the surgery itself--    Pt been using ice application tot he surgical site and the knee area and the lower leg area this helps some     Pt is doing PT 3 days weekly and then reports missed that 1 week with the ER visits and now back on track     And will F/U on 1/11/22 with Dr Blake office       PHQ-2 Total Score:    PHQ-9 Total Score:      Past Medical History:   Diagnosis Date   • Arrhythmia    • Arthritis     fingers, arm,hands   • CHF (congestive heart failure) ([facility])    • COPD (chronic obstructive pulmonary disease) ([facility])    •  Dermatitis     arm and leg -topical cream BID    • Disease of thyroid gland     hypothyroidism-recently started on meds 2/25/17   • Dyspnea    • Fungal infection of lung    • History of atrial flutter    • History of transfusion     with PFO at age 6   • Hyperlipidemia    • Low back pain    • Lung disease    • On home oxygen therapy     2 liters at night    • Pacemaker     medtronic   • Pacemaker at end of battery life     medtronic    • Patent foramen ovale     surgery at 6 years old    • Presence of dental prosthetic device     implants    • Sleep apnea    • Wears glasses        Allergies   Allergen Reactions   • Morphine Nausea And Vomiting   • Tylenol Pm Extra Strength [Diphenhydramine-Acetaminophen] Unknown - High Severity     Pt does not recall having any reaction, is able to take tylenol with no issues.  States her cardiologist told her to no longer take it & to list is on her allergies.   • Adenosine Nausea Only   • Codeine Nausea Only and Dizziness        Past Surgical History:   Procedure Laterality Date   • CARDIAC CATHETERIZATION Bilateral 8/29/2018    Procedure: Right and Left Heart Cath;  Surgeon: Mark Carrasco IV, MD;  Location:  [facility];  Service: Cardiovascular   • CARDIAC ELECTROPHYSIOLOGY PROCEDURE N/A 3/2/2017    Procedure: PPM generator change - dual;  Surgeon: Manuel Tavarez MD;  Location:  [facility];  Service:    • CATARACT EXTRACTION     • COLONOSCOPY     • DENTAL PROCEDURE     • EP STUDY      ablation -several times for atrial flutter/atrial fib    • HERNIA REPAIR     • HYSTERECTOMY     • JOINT REPLACEMENT Right     hip replacement    • PACEMAKER IMPLANTATION     • PACEMAKER REPLACEMENT     • PATENT FORAMEN OVALE CLOSURE      age 6   • TOTAL HIP ARTHROPLASTY Left 11/26/2021    Procedure: TOTAL HIP ARTHROPLASTY ANTERIOR left;  Surgeon: Lainey Blake MD;  Location: [facility] OR;  Service: Orthopedics;  Laterality: Left;        Social  History     Tobacco Use   • Smoking status: Former Smoker     Packs/day: 1.00     Years: 30.00     Pack years: 30.00     Types: Cigarettes     Quit date:      Years since quittin.9   • Smokeless tobacco: Never Used   • Tobacco comment: quit in    Vaping Use   • Vaping Use: Never used   Substance Use Topics   • Alcohol use: No   • Drug use: No       Family History   Problem Relation Age of Onset   • No Known Problems Mother    • No Known Problems Father         Health Maintenance Due   Topic Date Due   • DIABETIC FOOT EXAM  Never done   • PAP SMEAR  Never done   • ZOSTER VACCINE (2 of 2) 2019   • DXA SCAN  01/15/2021   • COVID-19 Vaccine (2 - Booster for Krunal series) 2021   • INFLUENZA VACCINE  2021   • DIABETIC EYE EXAM  10/14/2021        Current Outpatient Medications on File Prior to Visit   Medication Sig   • albuterol (PROVENTIL) (2.5 MG/3ML) 0.083% nebulizer solution Take 2.5 mg by nebulization Every 4 (Four) Hours As Needed.   • albuterol sulfate  (90 Base) MCG/ACT inhaler Inhale 2 puffs Every 6 (Six) Hours As Needed for Wheezing.   • arformoterol (BROVANA) 15 MCG/2ML nebulizer solution Take 15 mcg by nebulization 2 (Two) Times a Day.   • budesonide (PULMICORT) 0.5 MG/2ML nebulizer solution Take 2 mL by nebulization Daily.   • cephalexin (KEFLEX) 500 MG capsule Take 1 capsule by mouth 4 (Four) Times a Day.   • Cholecalciferol (VITAMIN D3) 50 MCG (2000 UT) capsule Take 2,000 Units by mouth Daily.   • dofetilide (TIKOSYN) 500 MCG capsule TAKE 1 CAPSULE TWICE A DAY (Patient taking differently: Take 500 mcg by mouth 2 (Two) Times a Day.)   • Eliquis 5 MG tablet tablet TAKE 1 TABLET TWICE A DAY   • furosemide (LASIX) 40 MG tablet TAKE 1 TABLET DAILY (Patient taking differently: Take 40 mg by mouth Daily.)   • HYDROcodone-acetaminophen (Norco) 7.5-325 MG per tablet Take 1-2 tablets by mouth Every 4 (Four) Hours As Needed for Moderate Pain .   • levothyroxine (SYNTHROID,  LEVOTHROID) 50 MCG tablet Take 1 tablet by mouth Every Morning.   • linaclotide (Linzess) 145 MCG capsule capsule Take 1 capsule by mouth Every Morning Before Breakfast.   • metoprolol tartrate (LOPRESSOR) 25 MG tablet TAKE 1 TABLET DAILY (Patient taking differently: Take 25 mg by mouth Every Night. Pt takes med nightly at 2200)   • O2 (OXYGEN) Inhale 2 L/min Every Night. 2L at night   • rosuvastatin (CRESTOR) 20 MG tablet TAKE 1 TABLET AT BEDTIME (Patient taking differently: Take 20 mg by mouth Every Night.)   • spironolactone (ALDACTONE) 50 MG tablet TAKE 1 TABLET BY MOUTH EVERY DAY (Patient taking differently: Take 50 mg by mouth Daily.)   • temazepam (RESTORIL) 30 MG capsule Take 1 capsule by mouth At Night As Needed for Sleep.   • tiotropium bromide monohydrate (Spiriva Respimat) 2.5 MCG/ACT aerosol solution inhaler Inhale 2 puffs Daily.   • [DISCONTINUED] predniSONE (DELTASONE) 20 MG tablet prednisone 20 mg tablet   TAKE 3 TABLETS BY MOUTH EVERY DAY FOR 5 DAYS     No current facility-administered medications on file prior to visit.       Immunization History   Administered Date(s) Administered   • COVID-19 (ZARINA) 03/05/2021   • Hepatitis A 05/01/2018, 11/06/2018   • Influenza, Unspecified 10/03/2018, 09/24/2019   • Pneumococcal Conjugate 13-Valent (PCV13) 12/02/2019   • Pneumococcal Polysaccharide (PPSV23) 09/24/2018   • Shingrix 12/14/2018   • Tdap 05/01/2018       Review of Systems   Constitutional: Negative for chills and fever.   HENT: Negative.    Eyes: Negative.    Respiratory: Positive for shortness of breath. Negative for cough.    Cardiovascular: Negative for chest pain.   Gastrointestinal: Negative.    Genitourinary: Negative.    Musculoskeletal: Positive for arthralgias, gait problem and myalgias.        As per HPI    Skin: Positive for color change, dry skin and rash.   Neurological: Negative for dizziness, light-headedness and headache.   Hematological: Negative.    Psychiatric/Behavioral:  "Negative for self-injury and suicidal ideas. The patient is nervous/anxious.         Objective     /80 (BP Location: Right arm, Patient Position: Sitting, Cuff Size: Adult)   Pulse 90   Temp 96.4 °F (35.8 °C) (Temporal)   Ht 160 cm (63\")   Wt 80.7 kg (178 lb)   SpO2 94%   BMI 31.53 kg/m²       Physical Exam  Vitals and nursing note reviewed.   Constitutional:       Appearance: Normal appearance.   HENT:      Head: Normocephalic.      Right Ear: External ear normal.      Left Ear: External ear normal.      Nose: Nose normal.      Mouth/Throat:      Comments: Wearing mask  Eyes:      Pupils: Pupils are equal, round, and reactive to light.   Cardiovascular:      Rate and Rhythm: Normal rate and regular rhythm.      Heart sounds: Murmur heard.        Comments: To the LLE approx 2+ edema then to the RLE 1-2 + edema   Pulmonary:      Effort: Pulmonary effort is normal.      Breath sounds: Normal breath sounds.   Abdominal:      General: Bowel sounds are normal.      Palpations: Abdomen is soft.   Musculoskeletal:         General: Normal range of motion.      Cervical back: Normal range of motion and neck supple.      Right lower le+ Pitting Edema present.      Left lower le+ Pitting Edema present.   Skin:     General: Skin is warm and dry.      Findings: Erythema and rash present. Rash is crusting and scaling.      Comments: Area to the outer lateral LLE swelling and redness and tenderness and mild heat noted --then to the RLE very mild cellulitis starting    Neurological:      Mental Status: She is alert and oriented to person, place, and time.   Psychiatric:         Mood and Affect: Mood normal.         Behavior: Behavior normal.         Judgment: Judgment normal.         Result Review :     The following data was reviewed by: NIRU Boston on 2021:      ED with Kenney Chatman MD (2021)  ED with Stevie Jansen DO (2021)  Office Visit with Devika Chaudhari PA " (12/10/2021)  CBC & Differential (12/03/2021 22:03)  Comprehensive Metabolic Panel (12/03/2021 22:03)  Lactic Acid, Plasma (12/03/2021 22:03)  Blood Culture - Blood, Arm, Right (12/03/2021 22:03)  Blood Culture - Blood, Arm, Left (12/03/2021 22:18)  Urinalysis With Culture If Indicated - Urine, Clean Catch (12/03/2021 23:50)  COVID-19,CEPHEID/KT/BDMAX,COR/KEV/PAD/RANJITH IN-HOUSE(OR EMERGENT/ADD-ON),NP SWAB IN TRANSPORT MEDIA 3-4 HR TAT, RT-PCR - Swab, Nasopharynx (12/04/2021 01:18)  CBC & Differential (12/06/2021 14:11)  Comprehensive Metabolic Panel (12/06/2021 14:11)  Duplex Venous Lower Extremity - Left CAR (12/03/2021 23:15)  XR Chest 1 View (12/03/2021 23:24)  XR Hip With or Without Pelvis 2 - 3 View Left (12/10/2021 10:24)             Assessment and Plan      Diagnoses and all orders for this visit:    1. Cellulitis of left lower extremity (Primary)  Comments:  pt will  take the last keflex today then start the doxycycline BID this eveing and when sitting prop up legs  Orders:  -     doxycycline (VIBRAMYCIN) 100 MG capsule; Take 1 capsule by mouth 2 (Two) Times a Day.  Dispense: 20 capsule; Refill: 0  -     mupirocin (BACTROBAN) 2 % ointment; Apply 1 application topically to the appropriate area as directed 3 (Three) Times a Day.  Dispense: 30 g; Refill: 0    2. Bilateral lower extremity edema  Comments:  pt will cont the lasix 40 mg QD and aldactone 50mg QD-then an additional 20 mg x 4 days of lasix of the afternoon and if sitting prop up legs    3. Complaint of panic attack  Comments:  I believe this is R/T the mild pulmonary edema that was seen on [facility] CXR int  ER dept and she will take the extra 20mg each afternoon x 4 days only     4. SOBOE (shortness of breath on exertion)  Comments:  that is a little worse than her normal-we will do the 20 mg additional of the afternoon x 4 days of the lasix     we will have her take the last pill of the keflex today then start the doxycycline this evening and then also  today take an additional 1/2 tab (20mg) of the lasix today and tomorrow and Saturday and Sunday and then F/U with me early next week--then also if sitting needs those legs propped up level or higher than her heart level --still do the exercises and the PT and cont the regular doses of her daily lasix 40 mg and aldactone 50 mg --stay well hydrated     I spent 30 minutes caring for Deb on this date of service. This time includes time spent by me in the following activities:preparing for the visit, reviewing tests, performing a medically appropriate examination and/or evaluation , counseling and educating the patient/family/caregiver, ordering medications, tests, or procedures, documenting information in the medical record and independently interpreting results and communicating that information with the patient/family/caregiver    Follow Up     Return in about 5 days (around 12/21/2021), or for F/U, for Recheck.

## 2021-12-21 NOTE — PROGRESS NOTES
Venipuncture Blood Specimen Collection  Venipuncture performed in left arm  by Colette Wang with good hemostasis. Patient tolerated the procedure well without complications.   12/21/21   Colette Wang   Ndc# (Optional): 2605-7324-90 Concentration (Mg/Ml): 40.0 Expiration Date (Optional): Jan 2021 Lot # (Optional): [id number] Total Volume (Ccs): 1 Concentration (Mg/Ml) Of Additional Medication: 2.5 Kenalog Preparation: kenalog Add Option For Additional Mediation: No Consent: The risks of atrophy were reviewed with the patient. Detail Level: Detailed Administered By (Optional): Dr. Bustos

## 2021-12-21 NOTE — PROGRESS NOTES
Chief Complaint  Follow-up (left not feeling any better)    Subjective            Deb Harrison presents to [facility]  Patient is here for follow-up of the left lower extremity not feeling any better and had just had the left total hip replacement 11/26/21 Dr Blake--then approx 1 week later pt started developing swelling of the left leg (whole leg) then pt went to the ER dept an they did the venous doppler and no DVT--then mild redness and thought could be early cellulitis and no antibiotics were given-this was on 12/3/21 and surgery was 11/26/21--then on the monday 12/6/21 pt reports the are was deeply red and swelled and went back to the ER dept then they gave the IV antibiotics's and then was sent home on keflex--(pt also had pictures and the pictures show the worsened redness and swelling)      Pt reports F/U with Dr Blake's office ([facility]) 12/10/21 and she removed the sutures and then eval'd the redness and swelling of the lower leg and then they said to just F/U with her PCP    And then we saw her here in the primary care office on 12/16/2021 and on that day the per examination and based on the pictures that she had in her thumb it only appeared to be a mild cellulitis with some scaling of the lower leg on the left lower leg and we went ahead and started doxycycline--patient is now here today with complaint that it is not much better--also we had her to take an additional 20 mg of Lasix in the afternoon for 3 days and then follow-up today to see if the edema had improved--patient has dropped 5 pounds since last visit and is currently back down to her normal weight    Pt reports taking a lot of tylenol for the pain 2-3 times daily--off the pain meds--and then the pt reports the tylenol worked better than the norco she was given and out of the pain meds now --not due to F/U with ortho until 1/11/22     Also reports did PT last Friday and was worn out--and in a  lot of pain --pt reports to the point of almost wants to cry-and reports the pain is approx 8/10 at night and during the day 5-6/10 on pain scale       PHQ-2 Total Score:    PHQ-9 Total Score:      Past Medical History:   Diagnosis Date   • Arrhythmia    • Arthritis     fingers, arm,hands   • CHF (congestive heart failure) ([facility])    • COPD (chronic obstructive pulmonary disease) ([facility])    • Dermatitis     arm and leg -topical cream BID    • Disease of thyroid gland     hypothyroidism-recently started on meds 2/25/17   • Dyspnea    • Fungal infection of lung    • History of atrial flutter    • History of transfusion     with PFO at age 6   • Hyperlipidemia    • Low back pain    • Lung disease    • On home oxygen therapy     2 liters at night    • Pacemaker     medtronic   • Pacemaker at end of battery life     medtronic    • Patent foramen ovale     surgery at 6 years old    • Presence of dental prosthetic device     implants    • Sleep apnea    • Wears glasses        Allergies   Allergen Reactions   • Morphine Nausea And Vomiting   • Tylenol Pm Extra Strength [Diphenhydramine-Acetaminophen] Unknown - High Severity     Pt does not recall having any reaction, is able to take tylenol with no issues.  States her cardiologist told her to no longer take it & to list is on her allergies.   • Adenosine Nausea Only   • Codeine Nausea Only and Dizziness        Past Surgical History:   Procedure Laterality Date   • CARDIAC CATHETERIZATION Bilateral 8/29/2018    Procedure: Right and Left Heart Cath;  Surgeon: Mark Carrasco IV, MD;  Location:  [facility];  Service: Cardiovascular   • CARDIAC ELECTROPHYSIOLOGY PROCEDURE N/A 3/2/2017    Procedure: PPM generator change - dual;  Surgeon: Manuel Tavarez MD;  Location:  [facility];  Service:    • CATARACT EXTRACTION     • COLONOSCOPY     • DENTAL PROCEDURE     • EP STUDY      ablation -several times for atrial flutter/atrial fib    •  HERNIA REPAIR     • HYSTERECTOMY     • JOINT REPLACEMENT Right     hip replacement    • PACEMAKER IMPLANTATION     • PACEMAKER REPLACEMENT     • PATENT FORAMEN OVALE CLOSURE      age 6   • TOTAL HIP ARTHROPLASTY Left 2021    Procedure: TOTAL HIP ARTHROPLASTY ANTERIOR left;  Surgeon: Lainey Blake MD;  Location: [facility];  Service: Orthopedics;  Laterality: Left;        Social History     Tobacco Use   • Smoking status: Former Smoker     Packs/day: 1.00     Years: 30.00     Pack years: 30.00     Types: Cigarettes     Quit date:      Years since quittin.9   • Smokeless tobacco: Never Used   • Tobacco comment: quit in    Vaping Use   • Vaping Use: Never used   Substance Use Topics   • Alcohol use: No   • Drug use: No       Family History   Problem Relation Age of Onset   • No Known Problems Mother    • No Known Problems Father         Health Maintenance Due   Topic Date Due   • DIABETIC FOOT EXAM  Never done   • PAP SMEAR  Never done   • ZOSTER VACCINE (2 of 2) 2019   • DXA SCAN  01/15/2021   • COVID-19 Vaccine (2 - Booster for Krunal series) 2021   • INFLUENZA VACCINE  2021   • DIABETIC EYE EXAM  10/14/2021        Current Outpatient Medications on File Prior to Visit   Medication Sig   • albuterol (PROVENTIL) (2.5 MG/3ML) 0.083% nebulizer solution Take 2.5 mg by nebulization Every 4 (Four) Hours As Needed.   • albuterol sulfate  (90 Base) MCG/ACT inhaler Inhale 2 puffs Every 6 (Six) Hours As Needed for Wheezing.   • arformoterol (BROVANA) 15 MCG/2ML nebulizer solution Take 15 mcg by nebulization 2 (Two) Times a Day.   • budesonide (PULMICORT) 0.5 MG/2ML nebulizer solution Take 2 mL by nebulization Daily.   • cephalexin (KEFLEX) 500 MG capsule Take 1 capsule by mouth 4 (Four) Times a Day.   • Cholecalciferol (VITAMIN D3) 50 MCG (2000 UT) capsule Take 2,000 Units by mouth Daily.   • dofetilide (TIKOSYN) 500 MCG capsule TAKE 1 CAPSULE TWICE A DAY (Patient taking  differently: Take 500 mcg by mouth 2 (Two) Times a Day.)   • doxycycline (VIBRAMYCIN) 100 MG capsule Take 1 capsule by mouth 2 (Two) Times a Day.   • Eliquis 5 MG tablet tablet TAKE 1 TABLET TWICE A DAY   • furosemide (LASIX) 40 MG tablet TAKE 1 TABLET DAILY (Patient taking differently: Take 40 mg by mouth Daily.)   • HYDROcodone-acetaminophen (Norco) 7.5-325 MG per tablet Take 1-2 tablets by mouth Every 4 (Four) Hours As Needed for Moderate Pain .   • levothyroxine (SYNTHROID, LEVOTHROID) 50 MCG tablet Take 1 tablet by mouth Every Morning.   • linaclotide (Linzess) 145 MCG capsule capsule Take 1 capsule by mouth Every Morning Before Breakfast.   • metoprolol tartrate (LOPRESSOR) 25 MG tablet TAKE 1 TABLET DAILY (Patient taking differently: Take 25 mg by mouth Every Night. Pt takes med nightly at 2200)   • mupirocin (BACTROBAN) 2 % ointment Apply 1 application topically to the appropriate area as directed 3 (Three) Times a Day.   • O2 (OXYGEN) Inhale 2 L/min Every Night. 2L at night   • rosuvastatin (CRESTOR) 20 MG tablet TAKE 1 TABLET AT BEDTIME (Patient taking differently: Take 20 mg by mouth Every Night.)   • spironolactone (ALDACTONE) 50 MG tablet TAKE 1 TABLET BY MOUTH EVERY DAY (Patient taking differently: Take 50 mg by mouth Daily.)   • temazepam (RESTORIL) 30 MG capsule Take 1 capsule by mouth At Night As Needed for Sleep.   • tiotropium bromide monohydrate (Spiriva Respimat) 2.5 MCG/ACT aerosol solution inhaler Inhale 2 puffs Daily.     No current facility-administered medications on file prior to visit.       Immunization History   Administered Date(s) Administered   • COVID-19 (ZARINA) 03/05/2021   • Hepatitis A 05/01/2018, 11/06/2018   • Influenza, Unspecified 10/03/2018, 09/24/2019   • Pneumococcal Conjugate 13-Valent (PCV13) 12/02/2019   • Pneumococcal Polysaccharide (PPSV23) 09/24/2018   • Shingrix 12/14/2018   • Tdap 05/01/2018       Review of Systems   Constitutional: Negative for chills and fever.  "       Lost 5#   HENT: Negative.    Eyes: Negative.    Respiratory: Positive for shortness of breath.         Chronically --and unchanged --due to the lungs and her heart and reports unchanged    Cardiovascular: Positive for leg swelling. Negative for chest pain and palpitations.        Just the swelling    Gastrointestinal: Negative.    Musculoskeletal: Positive for arthralgias, gait problem and myalgias.        Left knee to the foot and in the calf area         Objective     /80 (BP Location: Right arm, Patient Position: Sitting, Cuff Size: Adult)   Pulse 78   Temp 96.9 °F (36.1 °C) (Temporal)   Ht 160 cm (63\")   Wt 78.5 kg (173 lb)   SpO2 92%   BMI 30.65 kg/m²       Physical Exam  Vitals and nursing note reviewed.   Constitutional:       Appearance: Normal appearance.   HENT:      Head: Normocephalic.      Right Ear: External ear normal.      Left Ear: External ear normal.      Nose: Nose normal.      Mouth/Throat:      Comments: Wearing mask  Eyes:      Pupils: Pupils are equal, round, and reactive to light.   Cardiovascular:      Rate and Rhythm: Normal rate and regular rhythm.      Heart sounds: Murmur heard.       Pulmonary:      Effort: Pulmonary effort is normal.      Breath sounds: Normal breath sounds.      Comments: SOBOE   Abdominal:      General: Bowel sounds are normal.      Palpations: Abdomen is soft.      Tenderness: There is no abdominal tenderness.   Musculoskeletal:         General: Swelling and tenderness present.      Cervical back: Normal range of motion and neck supple.      Right lower le+ Edema present.      Left lower le+ Edema present.      Comments: (+) homans sign of the left calf area    Skin:     General: Skin is warm and dry.   Neurological:      Mental Status: She is alert and oriented to person, place, and time.   Psychiatric:         Mood and Affect: Mood normal.         Behavior: Behavior normal.         Judgment: Judgment normal.         Result Review : "     The following data was reviewed by: NIRU Boston on 12/21/2021:      From the venouos doppler in the ER dept 12/3/21:  Study Impression    •     Right Common Femoral:  No deep vein thrombosis noted.   •     Left Common Femoral: No deep vein thrombosis noted.   •     Left Saphenofemoral Junction: No superficial thrombophlebitis noted.   •     Left Proximal Femoral: No deep vein thrombosis noted.   •     Left Mid Femoral: No deep vein thrombosis noted.   •     Left Distal Femoral: No deep vein thrombosis noted.   •     Left Popliteal: No deep vein thrombosis noted.   •     Left Posterior Tibial: No deep vein thrombosis noted.    •     Left Peroneal: No deep vein thrombosis noted.   •     Left Great Saphenous Above Knee: No superficial thrombophlebitis noted.     CBC & Differential (12/06/2021 14:11)  CBC & Differential (12/03/2021 22:03)  Comprehensive Metabolic Panel (12/03/2021 22:03)  Lactic Acid, Plasma (12/03/2021 22:03)  Blood Culture - Blood, Arm, Right (12/03/2021 22:03)  Blood Culture - Blood, Arm, Left (12/03/2021 22:18)  Urinalysis With Culture If Indicated - Urine, Clean Catch (12/03/2021 23:50)  Comprehensive Metabolic Panel (12/06/2021 14:11)                 Assessment and Plan      Diagnoses and all orders for this visit:    1. Cellulitis of left lower extremity (Primary)  Comments:  vastly improved-with only the remainder of the swelling and only a faint pink  Orders:  -     D-dimer, Quantitative  -     CBC & Differential  -     Comprehensive Metabolic Panel  -     Duplex Venous Lower Extremity - Left CAR    2. Leg pain, diffuse, left  -     D-dimer, Quantitative  -     CBC & Differential  -     Comprehensive Metabolic Panel  -     Duplex Venous Lower Extremity - Left CAR    3. Left leg swelling  -     D-dimer, Quantitative  -     CBC & Differential  -     Comprehensive Metabolic Panel  -     proBNP  -     Duplex Venous Lower Extremity - Left CAR    4. History of total hip  arthroplasty, left  -     D-dimer, Quantitative  -     CBC & Differential  -     Comprehensive Metabolic Panel  -     Duplex Venous Lower Extremity - Left CAR    5. SOB (shortness of breath)  -     proBNP    Although patient reports that she felt that the Tylenol helped just as good as the pain medicine -she seems to be in quite a bit of pain and with the swelling I tried to explain that the swelling can increase the pain as well and since she is doing her physical therapy as well and that she should probably reach out to see if she can get a refill on the pain medicine since she is technically under their care still and  follows up with the orthopedist next month; we will go ahead and proceed with lab work as well as a repeat venous Doppler to make sure no blood clot has developed as she did have a positive Homans today and the cellulitis is vastly improved today    And should anything worsen or persist or new symptoms develop advised to go to the emergency room        Follow Up     Return if symptoms worsen or fail to improve.

## 2021-12-21 NOTE — PROGRESS NOTES
Called and spoke directly to patient and let her know that it was elevated and she reported to me that they just finished doing her venous Doppler and I am still awaiting results I will call her with these results advised the patient that the D-dimer can be elevated for multiple things such as DVT, infection, trauma, inflammation, recent surgery, but that if any symptoms persist or worsen to go on to the emergency room especially she cannot reach her orthopedic surgeon with regards to the past

## 2021-12-22 NOTE — PROGRESS NOTES
Called and spoke to pt with regards to the normal CBC and the normal venous doppler and pt still denies any CP--or any worsening SOB from her normal level of breathless R/T her heart and lung chronic conditions and pt was advised if anything changes or worsens to go to the ER dept and then she will reach out to the ortho office tomorrow with regards to the pain and possible need for the refill on pain med and hen pt will prop up BLE when sitting and I will call pt with the rest of the labs tomorrow--and then also already aware of the D-Dimer and that the elevation could be due to the recent surgery

## 2021-12-22 NOTE — PROGRESS NOTES
Called and spoke with patient personally and let her know that her comprehensive panel was completely normal as well as the electrolytes and the renal function and then also the BN P was normal range does not indicate any congestive heart failure; also her blood counts were completely normal and there was no signs of anemia or infection--and patient did ask since she is experiencing the swelling did not know if she should take that extra one half Lasix 80 more days because she did it for the 4 days and I have instructed her and I told her that it would not hurt since her renal function tests and the electrolytes looks good to take another half tablet of Lasix which would be equivalent to 20 mg additional around noon for the next 3 days prop up her leg stay well-hydrated and see if that does not help the edema--and then she did go ahead and reach out to the orthopedist office and they are going to be calling in another refill on her pain medication but the soonest it would go out would be tomorrow--

## 2021-12-28 NOTE — PROGRESS NOTES
"Chief Complaint  Pain of the Left Hip     Subjective      Deb Harrison presents to [facility] for an evaluation of left hip. Patient is s/p  left KULDEEP performed by Dr. Blake 2021. Patient developed an infection to the lateral aspect of the calf and she is currently taking Doxycycline. The infection has been getting better. She missed a few physical therapy visits due to this. She has been having difficulty with therapy since her infection.     Allergies   Allergen Reactions   • Morphine Nausea And Vomiting   • Tylenol Pm Extra Strength [Diphenhydramine-Acetaminophen] Unknown - High Severity     Pt does not recall having any reaction, is able to take tylenol with no issues.  States her cardiologist told her to no longer take it & to list is on her allergies.   • Adenosine Nausea Only   • Codeine Nausea Only and Dizziness        Social History     Socioeconomic History   • Marital status:    Tobacco Use   • Smoking status: Former Smoker     Packs/day: 1.00     Years: 30.00     Pack years: 30.00     Types: Cigarettes     Quit date:      Years since quittin.0   • Smokeless tobacco: Never Used   • Tobacco comment: quit in    Vaping Use   • Vaping Use: Never used   Substance and Sexual Activity   • Alcohol use: No   • Drug use: No   • Sexual activity: Defer        Review of Systems     Objective   Vital Signs:   Pulse 80   Ht 160 cm (63\")   Wt 78.5 kg (173 lb)   SpO2 94%   BMI 30.65 kg/m²       Physical Exam  Constitutional:       Appearance: Normal appearance. Patient is well-developed and normal weight.   HENT:      Head: Normocephalic.      Right Ear: Hearing and external ear normal.      Left Ear: Hearing and external ear normal.      Nose: Nose normal.   Eyes:      Conjunctiva/sclera: Conjunctivae normal.   Cardiovascular:      Rate and Rhythm: Normal rate.   Pulmonary:      Effort: Pulmonary effort is normal.      Breath sounds: No wheezing or rales. "   Abdominal:      Palpations: Abdomen is soft.      Tenderness: There is no abdominal tenderness.   Musculoskeletal:      Cervical back: Normal range of motion.   Skin:     Findings: No rash.   Neurological:      Mental Status: Patient is alert and oriented to person, place, and time.   Psychiatric:         Mood and Affect: Mood and affect normal.         Judgment: Judgment normal.       Ortho Exam      LEFT HIP: Full weightbearing without an assistive device. Incision is dry, no drainage. Moderate swelling throughout the lower extremity. Calf supple, non-tender, no signs of DVT. Sensation grossly intact. Neurovascular intact.        Procedures      Imaging Results (Most Recent)     None           Result Review :          Duplex Venous Lower Extremity - Left CAR    Result Date: 12/21/2021  Narrative: · Normal left lower extremity venous duplex scan.      Duplex Venous Lower Extremity - Left CAR    Result Date: 12/4/2021  Narrative: · Normal left lower extremity venous duplex scan.      XR Hip With or Without Pelvis 2 - 3 View Left    Result Date: 12/10/2021  Narrative: X-Ray Report: Study: X-rays ordered, taken in the office, and reviewed today Site: Left hip xray Indication: Pain View: AP and Lateral view(s) Findings: Hardware intact from left KULDEEP, one retained staple is noted into the wound, no acute soft tissue or osseous abnormalities are noted. Prior studies available for comparison: yes          Assessment and Plan     DX: Aftercare following left total hip arthroplasty   Lower extremity swelling     Discussed going back into the compression stockings to help relieve the swelling. Continue physical therapy. Repeat films next visit.     Call or return if worsening symptoms.    Follow Up     2 weeks.       Patient was given instructions and counseling regarding her condition or for health maintenance advice. Please see specific information pulled into the AVS if appropriate.     Scribed for Lainey Blake MD  by Zoe Rodriguez.  12/28/21   15:28 EST        I have personally performed the services described in this document as scribed by the above individual and it is both accurate and complete. Lainey Blake MD 12/29/21

## 2021-12-29 NOTE — PROGRESS NOTES
"Chief Complaint  Pain of the Left Hip     Subjective      Deb Harrison presents to [facility] for an evaluation of left hip. Patient is s/p  left KULDEEP performed by Dr. Blake 2021. Patient developed an infection to the lateral aspect of the calf and she is currently taking Doxycycline. The infection has been getting better. She missed a few physical therapy visits due to this. She has been having difficulty with therapy since her infection.     Allergies   Allergen Reactions   • Morphine Nausea And Vomiting   • Tylenol Pm Extra Strength [Diphenhydramine-Acetaminophen] Unknown - High Severity     Pt does not recall having any reaction, is able to take tylenol with no issues.  States her cardiologist told her to no longer take it & to list is on her allergies.   • Adenosine Nausea Only   • Codeine Nausea Only and Dizziness        Social History     Socioeconomic History   • Marital status:    Tobacco Use   • Smoking status: Former Smoker     Packs/day: 1.00     Years: 30.00     Pack years: 30.00     Types: Cigarettes     Quit date:      Years since quittin.0   • Smokeless tobacco: Never Used   • Tobacco comment: quit in    Vaping Use   • Vaping Use: Never used   Substance and Sexual Activity   • Alcohol use: No   • Drug use: No   • Sexual activity: Defer        Review of Systems     Objective   Vital Signs:   Pulse 80   Ht 160 cm (63\")   Wt 78.5 kg (173 lb)   SpO2 94%   BMI 30.65 kg/m²       Physical Exam  Constitutional:       Appearance: Normal appearance. Patient is well-developed and normal weight.   HENT:      Head: Normocephalic.      Right Ear: Hearing and external ear normal.      Left Ear: Hearing and external ear normal.      Nose: Nose normal.   Eyes:      Conjunctiva/sclera: Conjunctivae normal.   Cardiovascular:      Rate and Rhythm: Normal rate.   Pulmonary:      Effort: Pulmonary effort is normal.      Breath sounds: No wheezing or rales. "   Abdominal:      Palpations: Abdomen is soft.      Tenderness: There is no abdominal tenderness.   Musculoskeletal:      Cervical back: Normal range of motion.   Skin:     Findings: No rash.   Neurological:      Mental Status: Patient is alert and oriented to person, place, and time.   Psychiatric:         Mood and Affect: Mood and affect normal.         Judgment: Judgment normal.       Ortho Exam      LEFT HIP: Full weightbearing without an assistive device. Incision is dry, no drainage. Moderate swelling throughout the lower extremity. Calf supple, non-tender, no signs of DVT. Sensation grossly intact. Neurovascular intact.        Procedures      Imaging Results (Most Recent)     None           Result Review :          Duplex Venous Lower Extremity - Left CAR    Result Date: 12/21/2021  Narrative: · Normal left lower extremity venous duplex scan.      Duplex Venous Lower Extremity - Left CAR    Result Date: 12/4/2021  Narrative: · Normal left lower extremity venous duplex scan.      XR Hip With or Without Pelvis 2 - 3 View Left    Result Date: 12/10/2021  Narrative: X-Ray Report: Study: X-rays ordered, taken in the office, and reviewed today Site: Left hip xray Indication: Pain View: AP and Lateral view(s) Findings: Hardware intact from left KULDEEP, one retained staple is noted into the wound, no acute soft tissue or osseous abnormalities are noted. Prior studies available for comparison: yes          Assessment and Plan     DX: Aftercare following left total hip arthroplasty   Lower extremity swelling     Discussed going back into the compression stockings to help relieve the swelling. Continue physical therapy. Repeat films next visit.     Call or return if worsening symptoms.    Follow Up     2 weeks.       Patient was given instructions and counseling regarding her condition or for health maintenance advice. Please see specific information pulled into the AVS if appropriate.     Scribed for Lainey Blake MD  by Zoe Rodriguez.  12/28/21   15:28 EST        I have personally performed the services described in this document as scribed by the above individual and it is both accurate and complete. Lainey Blake MD 12/29/21

## 2022-01-01 ENCOUNTER — OFFICE VISIT (OUTPATIENT)
Dept: CARDIOLOGY | Facility: CLINIC | Age: 67
End: 2022-01-01

## 2022-01-01 ENCOUNTER — TELEPHONE (OUTPATIENT)
Dept: ORTHOPEDIC SURGERY | Facility: CLINIC | Age: 67
End: 2022-01-01

## 2022-01-01 ENCOUNTER — OFFICE VISIT (OUTPATIENT)
Dept: ORTHOPEDIC SURGERY | Facility: CLINIC | Age: 67
End: 2022-01-01

## 2022-01-01 VITALS
WEIGHT: 179 LBS | SYSTOLIC BLOOD PRESSURE: 127 MMHG | BODY MASS INDEX: 31.71 KG/M2 | HEART RATE: 79 BPM | HEIGHT: 63 IN | DIASTOLIC BLOOD PRESSURE: 59 MMHG

## 2022-01-01 VITALS — BODY MASS INDEX: 30.65 KG/M2 | WEIGHT: 173 LBS | HEIGHT: 63 IN | HEART RATE: 81 BPM | OXYGEN SATURATION: 93 %

## 2022-01-01 DIAGNOSIS — R60.0 BILATERAL LOWER EXTREMITY EDEMA: ICD-10-CM

## 2022-01-01 DIAGNOSIS — M25.552 LEFT HIP PAIN: Primary | ICD-10-CM

## 2022-01-01 DIAGNOSIS — M16.12 PRIMARY OSTEOARTHRITIS OF LEFT HIP: ICD-10-CM

## 2022-01-01 DIAGNOSIS — I34.2 NONRHEUMATIC MITRAL VALVE STENOSIS: ICD-10-CM

## 2022-01-01 DIAGNOSIS — I50.33 ACUTE ON CHRONIC DIASTOLIC CHF (CONGESTIVE HEART FAILURE): Primary | ICD-10-CM

## 2022-01-01 DIAGNOSIS — Z47.89 AFTERCARE FOLLOWING SURGERY OF THE MUSCULOSKELETAL SYSTEM: ICD-10-CM

## 2022-01-01 DIAGNOSIS — R26.2 DIFFICULTY IN WALKING: ICD-10-CM

## 2022-01-01 PROCEDURE — 99214 OFFICE O/P EST MOD 30 MIN: CPT | Performed by: INTERNAL MEDICINE

## 2022-01-01 PROCEDURE — 99024 POSTOP FOLLOW-UP VISIT: CPT | Performed by: PHYSICIAN ASSISTANT

## 2022-01-01 RX ORDER — POTASSIUM CHLORIDE 20 MEQ/1
20 TABLET, EXTENDED RELEASE ORAL DAILY
Qty: 90 TABLET | Refills: 1 | Status: SHIPPED | OUTPATIENT
Start: 2022-01-01

## 2022-01-01 RX ORDER — FUROSEMIDE 40 MG/1
TABLET ORAL
Qty: 90 TABLET | Refills: 3 | OUTPATIENT
Start: 2022-01-01

## 2022-01-01 RX ORDER — FUROSEMIDE 80 MG
80 TABLET ORAL 2 TIMES DAILY
Qty: 60 TABLET | Refills: 3 | Status: SHIPPED | OUTPATIENT
Start: 2022-01-01

## 2022-01-01 RX ORDER — HYDROCODONE BITARTRATE AND ACETAMINOPHEN 7.5; 325 MG/1; MG/1
1 TABLET ORAL EVERY 4 HOURS PRN
Qty: 42 TABLET | Refills: 0 | Status: SHIPPED | OUTPATIENT
Start: 2022-01-01

## 2022-01-01 RX ORDER — METOLAZONE 5 MG/1
2.5 TABLET ORAL DAILY
Qty: 30 TABLET | Refills: 3 | Status: SHIPPED | OUTPATIENT
Start: 2022-01-01

## 2022-01-01 RX ORDER — HYDROCODONE BITARTRATE AND ACETAMINOPHEN 7.5; 325 MG/1; MG/1
1 TABLET ORAL EVERY 4 HOURS PRN
Qty: 42 TABLET | Refills: 0 | Status: SHIPPED | OUTPATIENT
Start: 2022-01-01 | End: 2022-01-01 | Stop reason: SDUPTHER

## 2022-01-11 NOTE — PATIENT INSTRUCTIONS
X-rays taken and reviewed.  Continue PT and home exercises.  Rest ice elevate.  Narcotic pain medication refilled today.  Continue Tylenol for increased pain relief.  Patient takes blood thinners, cannot use NSAID medications.  Follow-up in 6 weeks for recheck on hip.    I discussed the patient should follow-up with her primary doctor regarding bilateral lower extremity swelling, shortness of air with exertion, color change in the extremities.  She had negative ultrasound for DVT of the left lower extremity 12/21/2021.  She states this is the last time she saw her primary doctor.  It seems patient may be suffering from fluid retention due to heart failure.  She states she recently tried increasing diuretic but she has not noticed much improvement.  I discussed if she has chest pain, shortness of air, rapid heart rate, lightheadedness, syncope or any other concerning symptoms that she should go to the emergency room for further evaluation.  Patient verbalized understanding.

## 2022-01-11 NOTE — PROGRESS NOTES
Chief Complaint  Follow-up and Pain of the Left Hip    Subjective          Deb Harrison presents to [facility] for follow-up on left hip status post left KULDEEP performed Dr. Blake 11/26/2021.  Patient last seen in our office 12/28/2021 by Dr. Blake.  At that time she developed an infection to the lateral aspect of the calf and was taking doxycycline.  She states the infection has gotten better and she completed the antibiotic but she still has significant swelling in bilateral lower extremities.  She has had 2 ultrasounds to rule out DVT in the leg, most recent ultrasound 12/21/2021.  Prior to this she had negative ultrasound 12/4/2021.  Here today for recheck.  She has been doing PT and home exercises.  She is taking Norco and Tylenol for pain relief.  She still has significant pain in the hip.  She states she cannot fit into her compression stockings therefore she is not wearing them.  But she has been resting icing and elevating.  Her primary doctor who she saw 12/21/2021 increased her diuretic, Lasix and spironolactone, but the patient states her symptoms have not improved.  In addition to bilateral lower extremity swelling and color change she also admits to shortness of air on exertion.  She states she is always had some aspect of shortness of air, even prior to surgery, but since surgery this is worsened.  Her cardiologist is Dr. Lucero.  She does deny any chest pain.  Presents today with a cane for ambulation and she is accompanied by her .    Objective   Allergies   Allergen Reactions   • Morphine Nausea And Vomiting   • Tylenol Pm Extra Strength [Diphenhydramine-Acetaminophen] Unknown - High Severity     Pt does not recall having any reaction, is able to take tylenol with no issues.  States her cardiologist told her to no longer take it & to list is on her allergies.   • Adenosine Nausea Only   • Codeine Nausea Only and Dizziness       Vital Signs:   Pulse 81   Ht  "160 cm (63\")   Wt 78.5 kg (173 lb)   SpO2 93%   BMI 30.65 kg/m²       Physical Exam  Constitutional:       Appearance: Normal appearance. Patient is well-developed and normal weight.   HENT:      Head: Normocephalic.      Right Ear: Hearing and external ear normal.      Left Ear: Hearing and external ear normal.      Nose: Nose normal.   Eyes:      Conjunctiva/sclera: Conjunctivae normal.   Cardiovascular:      Rate and Rhythm: Normal rate.   Pulmonary:      Effort: Pulmonary effort is normal.      Breath sounds: No wheezing or rales.   Abdominal:      Palpations: Abdomen is soft.      Tenderness: There is no abdominal tenderness.   Musculoskeletal:      Cervical back: Normal range of motion.   Skin:     Findings: No rash.   Neurological:      Mental Status: Patient is alert and oriented to person, place, and time.   Psychiatric:         Mood and Affect: Mood and affect normal.         Judgment: Judgment normal.     Ortho Exam  Left hip: Well-healed surgical incision, no erythema dehiscence or purulent drainage, no tenderness or swelling, limited flexion when compared to the right hip, limited abduction when compared to the right hip.  Mild pain with internal and external rotation.  Sensation light touch intact posterior tib pulses 2+, good range of motion left knee ankle and digits.  Bilateral lower extremities with 3+ edema, swelling, and generalized tenderness and dimpling of the skin is noted.  Generalized pinkness noted to bilateral lower legs, no obvious skin infections or open wounds.  Gait mildly antalgic.  Negative Homans' sign.  No palpable venous cords.  Result Review :            Imaging Results (Most Recent)     Procedure Component Value Units Date/Time    XR Hip With or Without Pelvis 2 - 3 View Left [[id number]] Resulted: 01/11/22 1145     Updated: 01/11/22 1145    Narrative:      X-Ray Report:  Study: X-rays ordered, taken in the office, and reviewed today  Site: Left hip xray  Indication: " Pain  View: AP and Lateral view(s)  Findings: Hardware intact from left KULDEEP, no acute osseous abnormalities or   malalignment  Prior studies available for comparison: yes          Duplex Venous Lower Extremity - Left CAR     Result Date: 12/21/2021  Narrative: · Normal left lower extremity venous duplex scan.       Duplex Venous Lower Extremity - Left CAR     Result Date: 12/4/2021  Narrative: · Normal left lower extremity venous duplex scan.           Assessment and Plan    Problem List Items Addressed This Visit        Musculoskeletal and Injuries    Aftercare following surgery of the left total hip arthroplasty    Current Assessment & Plan     X-rays taken and reviewed.  Continue PT and home exercises.  Rest ice elevate.  Narcotic pain medication refilled today.  Continue Tylenol for increased pain relief.  Patient takes blood thinners, cannot use NSAID medications.  Follow-up in 6 weeks for recheck on hip.    I discussed the patient should follow-up with her primary doctor regarding bilateral lower extremity swelling, shortness of air with exertion, color change in the extremities.  She had negative ultrasound for DVT of the left lower extremity 12/21/2021.  She states this is the last time she saw her primary doctor.  It seems patient may be suffering from fluid retention due to heart failure.  She states she recently tried increasing diuretic but she has not noticed much improvement.  I discussed if she has chest pain, shortness of air, rapid heart rate, lightheadedness, syncope or any other concerning symptoms that she should go to the emergency room for further evaluation.  Patient verbalized understanding.           Other Visit Diagnoses     Left hip pain    -  Primary    Relevant Orders    XR Hip With or Without Pelvis 2 - 3 View Left (Completed)          Follow Up   Return in about 6 weeks (around 2/22/2022) for Recheck.  Patient Instructions   X-rays taken and reviewed.  Continue PT and home exercises.   Rest ice elevate.  Narcotic pain medication refilled today.  Continue Tylenol for increased pain relief.  Patient takes blood thinners, cannot use NSAID medications.  Follow-up in 6 weeks for recheck on hip.    I discussed the patient should follow-up with her primary doctor regarding bilateral lower extremity swelling, shortness of air with exertion, color change in the extremities.  She had negative ultrasound for DVT of the left lower extremity 12/21/2021.  She states this is the last time she saw her primary doctor.  It seems patient may be suffering from fluid retention due to heart failure.  She states she recently tried increasing diuretic but she has not noticed much improvement.  I discussed if she has chest pain, shortness of air, rapid heart rate, lightheadedness, syncope or any other concerning symptoms that she should go to the emergency room for further evaluation.  Patient verbalized understanding.    Patient was given instructions and counseling regarding her condition or for health maintenance advice. Please see specific information pulled into the AVS if appropriate.

## 2022-01-20 NOTE — PROGRESS NOTES
"Chief Complaint  Hypertension and Edema    Subjective            Deb Harrison presents to [facility]  History of Present Illness  Ms. Harrison presents for routine follow up today and states she is feeling \"awful\" over the last 2-3 months.  She reports significantly increased generalized fatigue and a mild-moderate increase in her chronic exertional dyspnea.  She is also experiencing some mild-moderate bilateral lower extremity edema.  No episodes of chest pain/pressure, palpitations, PND, lightheadedness, or syncope reported.  Her last echocardiogram in March 2021 showed low normal left ventricular systolic function with an estimated ejection fraction of 50% and a moderately dilated right ventricle with borderline reduced right ventricular systolic function. She has a complex cardiac history, including surgical repair of a congenital atrial septal defect with past closure at age 6, as well as chronic right ventricular failure secondary to previous volume overload from the ASD. In addition, she follows with Dr. Tavarez regularly for a history of atypical atrial flutter, status post multiple radiofrequency ablations, as well as a history of atrial tachycardia and paroxysmal atrial fibrillation. She has a permanent pacemaker (placed for AV block) and it is interrogated regularly by Dr. Tavarez's office.  She also has a history of diastolic dysfunction and mild pulmonary hypertension (followed by her pulmonologist).  Ms. Harrison remains on chronic anticoagulation with Eliquis and does not report any bleeding issues since her last visit.  She previously reported lower extremity edema (right > left) at her last visit and was found to have significant right saphenofemoral venous reflux on a recent ultrasound.  Her BP was stable at 127/59 today.      Past Medical History:   Diagnosis Date   • Arrhythmia    • Arthritis     fingers, arm,hands   • CHF (congestive heart failure) (HCC)  "   • COPD (chronic obstructive pulmonary disease) (HCC)    • Dermatitis     arm and leg -topical cream BID    • Disease of thyroid gland     hypothyroidism-recently started on meds 15   • Dyspnea    • Fungal infection of lung    • History of atrial flutter    • History of transfusion     with PFO at age 6   • Hyperlipidemia    • Low back pain    • Lung disease    • On home oxygen therapy     2 liters at night    • Pacemaker     medtronic   • Pacemaker at end of battery life     medtronic    • Patent foramen ovale     surgery at 6 years old    • Presence of dental prosthetic device     implants    • Sleep apnea    • Wears glasses        Past Surgical History:   • CARDIAC CATHETERIZATION    Procedure: Right and Left Heart Cath;  Surgeon: Mark Carrasco IV, MD;  Location:  [facility];  Service: Cardiovascular   • CARDIAC ELECTROPHYSIOLOGY PROCEDURE    Procedure: PPM generator change - dual;  Surgeon: Manuel Tavarez MD;  Location:  [facility];  Service:    • CATARACT EXTRACTION   • COLONOSCOPY   • DENTAL PROCEDURE   • EP STUDY    ablation -several times for atrial flutter/atrial fib    • HERNIA REPAIR   • HYSTERECTOMY   • JOINT REPLACEMENT    hip replacement    • PACEMAKER IMPLANTATION   • PACEMAKER REPLACEMENT   • PATENT FORAMEN OVALE CLOSURE    age 6   • TOTAL HIP ARTHROPLASTY    Procedure: TOTAL HIP ARTHROPLASTY ANTERIOR left;  Surgeon: Lainey Blake MD;  Location: [facility];  Service: Orthopedics;  Laterality: Left;       Social History     Tobacco Use   • Smoking status: Former Smoker     Packs/day: 1.00     Years: 30.00     Pack years: 30.00     Types: Cigarettes     Quit date:      Years since quittin.0   • Smokeless tobacco: Never Used   • Tobacco comment: quit in    Vaping Use   • Vaping Use: Never used   Substance Use Topics   • Alcohol use: No   • Drug use: No       Family History   Problem Relation Age of Onset   • No Known Problems  Mother    • No Known Problems Father         Current Outpatient Medications on File Prior to Visit   Medication Sig   • albuterol (PROVENTIL) (2.5 MG/3ML) 0.083% nebulizer solution Take 2.5 mg by nebulization Every 4 (Four) Hours As Needed.   • albuterol sulfate  (90 Base) MCG/ACT inhaler Inhale 2 puffs Every 6 (Six) Hours As Needed for Wheezing.   • arformoterol (BROVANA) 15 MCG/2ML nebulizer solution Take 15 mcg by nebulization 2 (Two) Times a Day.   • budesonide (PULMICORT) 0.5 MG/2ML nebulizer solution Take 2 mL by nebulization Daily.   • cephalexin (KEFLEX) 500 MG capsule Take 1 capsule by mouth 4 (Four) Times a Day.   • Cholecalciferol (VITAMIN D3) 50 MCG (2000 UT) capsule Take 2,000 Units by mouth Daily.   • dofetilide (TIKOSYN) 500 MCG capsule TAKE 1 CAPSULE TWICE A DAY (Patient taking differently: Take 500 mcg by mouth 2 (Two) Times a Day.)   • doxycycline (VIBRAMYCIN) 100 MG capsule Take 1 capsule by mouth 2 (Two) Times a Day.   • Eliquis 5 MG tablet tablet TAKE 1 TABLET TWICE A DAY   • HYDROcodone-acetaminophen (NORCO) 7.5-325 MG per tablet Take 1 tablet by mouth Every 4 (Four) Hours As Needed for Moderate Pain .   • levothyroxine (SYNTHROID, LEVOTHROID) 50 MCG tablet Take 1 tablet by mouth Every Morning.   • linaclotide (Linzess) 145 MCG capsule capsule Take 1 capsule by mouth Every Morning Before Breakfast.   • metoprolol tartrate (LOPRESSOR) 25 MG tablet TAKE 1 TABLET DAILY (Patient taking differently: Take 25 mg by mouth Every Night. Pt takes med nightly at 2200)   • mupirocin (BACTROBAN) 2 % ointment Apply 1 application topically to the appropriate area as directed 3 (Three) Times a Day.   • O2 (OXYGEN) Inhale 2 L/min Every Night. 2L at night   • rosuvastatin (CRESTOR) 20 MG tablet TAKE 1 TABLET AT BEDTIME (Patient taking differently: Take 20 mg by mouth Every Night.)   • spironolactone (ALDACTONE) 50 MG tablet TAKE 1 TABLET BY MOUTH EVERY DAY (Patient taking differently: Take 50 mg by mouth  "Daily.)   • temazepam (RESTORIL) 30 MG capsule Take 1 capsule by mouth At Night As Needed for Sleep.   • tiotropium bromide monohydrate (Spiriva Respimat) 2.5 MCG/ACT aerosol solution inhaler Inhale 2 puffs Daily.     No current facility-administered medications on file prior to visit.       Allergies   Allergen Reactions   • Morphine Nausea And Vomiting   • Tylenol Pm Extra Strength [Diphenhydramine-Acetaminophen] Unknown - High Severity     Pt does not recall having any reaction, is able to take tylenol with no issues.  States her cardiologist told her to no longer take it & to list is on her allergies.   • Adenosine Nausea Only   • Codeine Nausea Only and Dizziness       Review of Systems   Constitutional: Positive for activity change and fatigue. Negative for chills and fever.   HENT: Negative for hearing loss, nosebleeds and sore throat.    Eyes: Negative for pain and visual disturbance.   Respiratory: Positive for shortness of breath. Negative for cough and wheezing.    Cardiovascular: Positive for leg swelling. Negative for chest pain and palpitations.   Gastrointestinal: Negative for abdominal pain, blood in stool and vomiting.   Endocrine: Negative for cold intolerance and heat intolerance.   Genitourinary: Negative for dysuria and hematuria.   Musculoskeletal: Positive for arthralgias. Negative for joint swelling and myalgias.   Skin: Negative for color change, pallor and rash.   Neurological: Negative for tremors, syncope, light-headedness and headache.        Objective     /59   Pulse 79   Ht 160 cm (63\")   Wt 81.2 kg (179 lb)   BMI 31.71 kg/m²       Physical Exam  Constitutional:       General: She is not in acute distress.     Appearance: Normal appearance.   HENT:      Head: Atraumatic.      Mouth/Throat:      Mouth: Mucous membranes are moist.      Pharynx: Oropharynx is clear. No oropharyngeal exudate.   Eyes:      General: No scleral icterus.     Conjunctiva/sclera: Conjunctivae normal. "   Neck:      Vascular: No carotid bruit or JVD.   Cardiovascular:      Rate and Rhythm: Normal rate and regular rhythm.  No extrasystoles are present.     Chest Wall: PMI is not displaced.      Pulses:           Radial pulses are 2+ on the right side and 2+ on the left side.      Heart sounds: S1 normal and S2 normal. Murmur heard.    Systolic murmur is present with a grade of 3/6.   Diastolic murmur is present with a grade of 1/4.  No friction rub. No gallop. No S3 sounds.    Pulmonary:      Effort: Pulmonary effort is normal. Prolonged expiration present. No tachypnea or respiratory distress.      Breath sounds: Examination of the right-lower field reveals decreased breath sounds. Decreased breath sounds present. No wheezing, rhonchi or rales.   Chest:      Chest wall: No tenderness.   Abdominal:      General: Bowel sounds are normal. There is no distension.      Palpations: Abdomen is soft. There is no mass.      Tenderness: There is no abdominal tenderness.      Comments: Obese.   Musculoskeletal:         General: No swelling, tenderness or deformity.      Cervical back: Neck supple. No tenderness.      Right lower le+ Pitting Edema present.      Left lower le+ Pitting Edema present.   Skin:     General: Skin is warm and dry.      Coloration: Skin is not jaundiced.      Findings: No erythema or rash.      Nails: There is no clubbing.   Neurological:      General: No focal deficit present.      Mental Status: She is alert and oriented to person, place, and time.      Motor: No weakness.   Psychiatric:         Mood and Affect: Mood normal.         Behavior: Behavior normal.         Result Review :     The following data was reviewed by: Phuc Lucero MD on 2022:    CMP    CMP 12/3/21 12/6/21 12/21/21   Glucose 104 (A) 84 94   BUN 7 (A) 8 9   Creatinine 0.73 0.75 0.70   eGFR Non African Am 79 77 83   Sodium 139 139 141   Potassium 3.6 3.3 (A) 3.7   Chloride 101 99 102   Calcium 9.7 10.0 10.3    Albumin 3.80 3.90 4.50   Total Bilirubin 0.9 0.9 0.8   Alkaline Phosphatase 87 88 98   AST (SGOT) 22 21 16   ALT (SGPT) 12 11 10   (A) Abnormal value            CBC    CBC 12/3/21 12/6/21 12/21/21   WBC 7.93 8.15 8.00   RBC 4.13 4.30 4.45   Hemoglobin 12.5 12.8 13.0   Hematocrit 37.1 38.8 40.0   MCV 89.8 90.2 89.9   MCH 30.3 29.8 29.2   MCHC 33.7 33.0 32.5   RDW 16.2 (A) 16.5 (A) 14.8   Platelets 220 249 215   (A) Abnormal value            Lipid Panel    Lipid Panel 3/22/21 3/31/21 5/26/21   Total Cholesterol 97 (A) 108 104 (A)   Triglycerides 192 (A) 136 140   HDL Cholesterol 32 (A) 44 38 (A)   VLDL Cholesterol 38 (A) 27 28   LDL Cholesterol  27 (A) 37 (A) 38 (A)   (A) Abnormal value       Comments are available for some flowsheets but are not being displayed.                Assessment and Plan      Diagnoses and all orders for this visit:    1. Acute on chronic diastolic CHF (congestive heart failure) (HCC) (Primary)  -     Adult Transthoracic Echo Complete w/ Color, Spectral and Contrast if necessary per protocol; Future  -     furosemide (LASIX) 80 MG tablet; Take 1 tablet by mouth 2 (Two) Times a Day.  Dispense: 60 tablet; Refill: 3  -     potassium chloride (K-DUR,KLOR-CON) 20 MEQ CR tablet; Take 1 tablet by mouth Daily.  Dispense: 90 tablet; Refill: 1  -     metOLazone (ZAROXOLYN) 5 MG tablet; Take 0.5 tablets by mouth Daily.  Dispense: 30 tablet; Refill: 3  -     Comprehensive Metabolic Panel; Future    2. Bilateral lower extremity edema  -     furosemide (LASIX) 80 MG tablet; Take 1 tablet by mouth 2 (Two) Times a Day.  Dispense: 60 tablet; Refill: 3  -     potassium chloride (K-DUR,KLOR-CON) 20 MEQ CR tablet; Take 1 tablet by mouth Daily.  Dispense: 90 tablet; Refill: 1  -     metOLazone (ZAROXOLYN) 5 MG tablet; Take 0.5 tablets by mouth Daily.  Dispense: 30 tablet; Refill: 3  -     Comprehensive Metabolic Panel; Future    3. Nonrheumatic mitral valve stenosis  -     Adult Transthoracic Echo Complete w/  Color, Spectral and Contrast if necessary per protocol; Future    -Acute on chronic diastolic CHF:  Will increase her diuretic therapy therapy as above with Lasix and metolazone and continue Aldactone.  Will start K+ supplementation as above and check a CMP in one week.  Check an echocardiogram as above to assess for worsening right ventricular dysfunction or other structural abnormalities.  She was instructed to call our office in one week and let us know how her symptoms are doing with the new medication changes.     -Mitral stenosis:  Her cardiac murmurs are intensified on exam today.  Check an echo within the next few days for further evaluation.      Follow Up     Return in about 3 months (around 4/18/2022).    Patient was given instructions and counseling regarding her condition or for health maintenance advice. Please see specific information pulled into the AVS if appropriate.     Deb TUCKER Hunter  reports that she quit smoking about 14 years ago. Her smoking use included cigarettes. She has a 30.00 pack-year smoking history. She has never used smokeless tobacco.. I have educated her on the risk of diseases from using tobacco products such as cancer, COPD and heart disease.  Continued tobacco cessation was strongly advised.

## 2022-01-26 DIAGNOSIS — K59.00 CONSTIPATION, UNSPECIFIED CONSTIPATION TYPE: ICD-10-CM

## 2022-01-26 RX ORDER — LINACLOTIDE 145 UG/1
CAPSULE, GELATIN COATED ORAL
Qty: 30 CAPSULE | Refills: 1 | OUTPATIENT
Start: 2022-01-26

## 2022-02-07 ENCOUNTER — TELEPHONE (OUTPATIENT)
Dept: CARDIOLOGY | Facility: CLINIC | Age: 67
End: 2022-02-07

## 2022-02-09 NOTE — TELEPHONE ENCOUNTER
Pt's spouse, Mr. Harrison, called back requesting the serial number of the patient's Medtronic pacemaker. The serial number was verbally provided to Mr. Harrison: [employer]  [id number].  
Pt’s spouse, Mr. Harrison, called to report patient  2022. Spouse stated he had the  home explant the patient’s pacemaker for Dr. Tavarez to look at. I advised Mr. Harrison to contact [employer] ([phone]) if he wanted to pacemaker assessed & provided [employer] customer service phone number.   
detailed exam

## 2022-06-21 ENCOUNTER — APPOINTMENT (OUTPATIENT)
Dept: CT IMAGING | Facility: HOSPITAL | Age: 67
End: 2022-06-21

## 2025-02-05 NOTE — H&P
History and Physical      Patient Name: Deb Harrison   Patient ID: [id number]   Sex: Female   YOB: 1955    Primary Care Provider: Siri CARLOS    Visit Date: June 19, 2020    Provider: Phuc Lucero MD   Location: [facility]   Location Address: [address] [place] [address], [facility]   [place], [place]  [id number]   Location Phone: [phone]          Chief Complaint  · Shortness of breath       History Of Present Illness  Consult requested by: REFERRING CARE PROVIDER NAME   Deb Harrison is a 64 year old /White female who presents as a self-referral today. She has a complex cardiac history, including surgical repair of a congenital atrial septal defect with patch closure at age 6, atypical atrial flutter, s/p multiple radiofrequency ablations, previous pacemaker placement and chronic right ventricular failure as well as moderate mitral regurgitation and moderate tricuspid regurgitation. She follows for her electrophysiology needs with Dr. Tavarez of [facility] and is scheduled to see him again in August. Patient had a previous right heart catheterization in August 2008 that was indicative of mild pulmonary hypertension with an elevated pulmonary capillary wedge pressure suggestive of pulmonary venous hypertension secondary to left ventricular diastolic dysfunction. However, a follow-up right heart catheterization in 2018 showed normal pulmonary artery pressures with moderately elevated right atrial pressure per review of her records. It was felt at that time that her congenital previous ASD was responsible for her right ventricular enlargement/failure. She also had a left heart catheterization at that time that showed normal coronary arteries and normal LV EF with mildly elevated LV EDP. A DELIA performed in March 2018 likewise showed a normal ejection fraction with moderate mitral regurgitation and moderate to severe right ventricular enlargement.  She reports occasional twinges of localized substernal chest pain but states these episodes occur once4 every 3 to 4 months and last less than 5 seconds. She does not report any history of exertional chest discomfort, orthopnea, PND, lightheadedness or syncope. She has been on anti-arrhythmic therapy with Tikosyn 500 mcg b.i.d. since 2015 and is maintaining sinus rhythm with only very rare, short episodes of palpitations reported. Mrs. Harrison remains on chronic anti-coagulation with Eliquis for a history of atrial fibrillation/flutter, and she has not experienced any bleeding issues. No abdominal pain, cough, weight gain or wheezing reported. She apparently also has significant pulmonary disease and is followed by a local pulmonologist.   PAST MEDICAL HISTORY: Positive for atrial flutter/fibrillation; right ventricular failure; obstructive sleep apnea; hyperlipidemia; hypothyroidism; moderate mitral regurgitation.   PSYCHOSOCIAL HISTORY: Mrs. Harrison is . She is a former smoker but quit in 2008. She previously smoked 1 pack a day for greater than 20 years. She has no history of alcohol abuse or illicit drug use.   FAMILY HISTORY: Significant for diabetes in her mother. Negative for hypertension or premature coronary artery disease.   CURRENT MEDICATIONS: include Spironolactone 50 mg daily; Levothyroxine 75 mcg daily; Temazepam 30 mg daily; Dofetilide 500 mg b.i.d; Rosuvastatin 20 mg daily; Eliquis 5 mg b.i.d.; Furosemide 40 mg daily; Metoprolol Tartrate 25 mg daily; Levothyroxine 75 mcg daily; Anoro Ellipta; Budesonide; Albuterol; Vitamin D3; Linzess 145 mcg daily; Voriconazole 200 mg b.i.d. The dosage and frequency of the medications were reviewed with the patient.   ALLERGIES: Adenosine and codeine, which both cause nausea and dizziness.      PAST SURGICAL HISTORY:  Significant for a patch closure of an atrial septal defect at age 6; also significant for partial hysterectomy in 1979; right total hip  "replacement in 2015; and multiple radiofrequency ablations for atypical atrial flutter from 2004 through 2016; permanent pacemaker placement for atrial ventricular block and pulse generator change in March 2017 (her pacemaker is followed by Dr. Tavarez).       Review of Systems  · Constitutional  o Admits  o : fatigue, good general health lately  o Denies  o : recent weight changes   · Eyes  o Denies  o : double vision  · HENT  o Denies  o : hearing loss or ringing, chronic sinus problem, swollen glands in neck  · Cardiovascular  o Admits  o : chest pain, palpitations (fast, fluttering, or skipping beats), shortness of breath while walking or lying flat  o Denies  o : swelling (feet, ankles, hands)  · Respiratory  o Admits  o : chronic or frequent cough, asthma or wheezing, COPD  · Gastrointestinal  o Denies  o : ulcers, nausea or vomiting  · Neurologic  o Admits  o : lightheaded or dizzy  o Denies  o : stroke, headaches  · Musculoskeletal  o Admits  o : joint pain, back pain  · Endocrine  o Admits  o : excessive thirst or urination  o Denies  o : thyroid disease, diabetes, heat or cold intolerance  · Heme-Lymph  o Denies  o : bleeding or bruising tendency, anemia      Vitals  Date Time BP Position Site L\R Cuff Size HR RR TEMP (F) WT  HT  BMI kg/m2 BSA m2 O2 Sat HC       06/19/2020 12:12 /64 Sitting    76 - R   183lbs 0oz 5'  4\" 31.41 1.94           Physical Examination  · Constitutional  o Appearance  o : Awake, alert, in no acute distress.  · Head and Face  o HEENT  o : No pallor, anicteric. Eyes normal. Moist mucous membranes.  · Neck  o Inspection/Palpation  o : Supple. No hepatosplenomegaly.  o Jugular Veins  o : No JVD. No carotid bruits.  · Respiratory  o Auscultation of Lungs  o : Clear to auscultation bilaterally. No crackles or wheezing.  · Cardiovascular  o Heart  o : S1, S2 is normally heard. No S3. No rubs or gallops. She has a 2/6 systolic murmur at the right lower sternal border and at the " Quality 130: Documentation Of Current Medications In The Medical Record: Current Medications Documented apex. No friction, rub or heave.   · Gastrointestinal  o Abdominal Examination  o : Soft, non-distended. No palpable hepatosplenomegaly. Bowel sounds heard in all four quadrants.  · Musculoskeletal  o General  o : Normal muscle tone and strength.  · Skin and Subcutaneous Tissue  o General Inspection  o : No skin rashes.  · Extremities  o Extremities  o : Warm and well perfused. 2+ radial and posterior tibial pulses bilaterally. No pitting pedal edema.     Labs from May 2020 were reviewed.  White blood cells 8.36, hemoglobin 15.2, hematocrit 47.8, platelets 205.  Sodium 139, potassium 4.0, BUN 13, creatinine 0.79, glucose 66.  Liver function tests were within normal limits.  Lipid panel:  , , HDL 39, LDL 36.    EKG was performed in the office today.  Indication:       shortness of breath, history of atrial flutter.  Results:          dual AV-paced rhythm with normal device function.  Comparison:   Compared to the previous EKG from Dr. Tavarez's office, there was no significant change.                        The QTc interval was mildly prolonged but acceptable given the patient's right ventricular                         pacing.             Assessment     1.  Atypical atrial flutter/atrial tachycardia, s/p multiple radiofrequency ablations in the past and on chronic       anti-arrhythmic therapy with Tikosyn.  2.  History of AV block, s/p AV node ablation and permanent pacemaker implant with normal device function        per last interrogation.  3.  Moderate mitral regurgitation and tricuspid regurgitation per last DELIA in 2018.  4.  Right ventricular enlargement/failure with a history of congenital ASD repair with patch closure at age 6.  5.  Interstitial lung disease.       Plan     Will repeat a transthoracic echocardiogram at this time to reassess her pulmonary pressures, as her     pulmonologist recently expressed concern for pulmonary arterial hypertension.  We will also be able to   reassess her  Detail Level: Generalized mitral regurgitation and tricuspid regurgitation as well as her global left ventricular function at  that time.  Continue chronic anti-coagulation with Eliquis for a history of atypical atrial flutter.  She appears to be maintaining sinus rhythm on Tikosyn with an acceptable QTc level given her right ventricular pacing.  Continue chronic diuretic therapy with Furosemide and her other home medications. If her echocardiogram shows stable findings from her last DELIA in 2018, I will just plan to see her back in the office in one year.  She has a follow-up with Dr. Tavarez in 2 months.    Phuc Lucero MD  BP/dmd           This note was transcribed by Radha Mancilla.  dmd/BP  The above service was transcribed by Radha Mancilla, and I attest to the accuracy of the note.  BP                 Electronically Signed by: Radha Mancilla-, -Author on June 23, 2020 11:16:45 AM  Electronically Co-signed by: Phuc Lucero MD -Reviewer on June 23, 2020 12:47:09 PM   Quality 226: Preventive Care And Screening: Tobacco Use: Screening And Cessation Intervention: Patient screened for tobacco use and is an ex/non-smoker

## (undated) DEVICE — ANTIBACTERIAL VIOLET BRAIDED (POLYGLACTIN 910), SYNTHETIC ABSORBABLE SURGICAL SUTURE: Brand: COATED VICRYL

## (undated) DEVICE — LEX ELECTRO PHYSIOLOGY: Brand: MEDLINE INDUSTRIES, INC.

## (undated) DEVICE — MODEL AT P65, P/N 701554-001KIT CONTENTS: HAND CONTROLLER, 3-WAY HIGH-PRESSURE STOPCOCK WITH ROTATING END AND PREMIUM HIGH-PRESSURE TUBING: Brand: ANGIOTOUCH® KIT

## (undated) DEVICE — PROXIMATE RH ROTATING HEAD SKIN STAPLERS (35 WIDE) CONTAINS 35 STAINLESS STEEL STAPLES: Brand: PROXIMATE

## (undated) DEVICE — PENCL E/S HNDSWCH ROCKRBTN HOLSTR 10FT

## (undated) DEVICE — 3M™ STERI-STRIP™ REINFORCED ADHESIVE SKIN CLOSURES, R1547, 1/2 IN X 4 IN (12 MM X 100 MM), 6 STRIPS/ENVELOPE: Brand: 3M™ STERI-STRIP™

## (undated) DEVICE — PINNACLE INTRODUCER SHEATH: Brand: PINNACLE

## (undated) DEVICE — SWAN-GANZ THERMODILUTION CATHETER: Brand: SWAN-GANZ

## (undated) DEVICE — TUBING, SUCTION, 1/4" X 10', STRAIGHT: Brand: MEDLINE

## (undated) DEVICE — SET PRIMARY GRVTY 10DP MALE LL 104IN

## (undated) DEVICE — GAUZE,SPONGE,4"X4",16PLY,STRL,LF,10/TRAY: Brand: MEDLINE

## (undated) DEVICE — DECANTER: Brand: UNBRANDED

## (undated) DEVICE — DRSNG SURESITE123 4X4.8IN

## (undated) DEVICE — ST INF PRI SMRTSTE 20DRP 2VLV 24ML 117

## (undated) DEVICE — CVR LEG BOOTLEG F/R NOSKID 33IN

## (undated) DEVICE — GLV SURG SENSICARE SLT PF LF 8.5 STRL

## (undated) DEVICE — IRRIGATOR BULB ASEPTO 60CC STRL

## (undated) DEVICE — TOWEL,OR,DSP,ST,BLUE,STD,4/PK,20PK/CS: Brand: MEDLINE

## (undated) DEVICE — ADULT, W/LG. BACK PAD, RADIOTRANSPARENT ELEMENT AND LEAD WIRE: Brand: DEFIBRILLATION ELECTRODES

## (undated) DEVICE — GW J TP FIX CORE .035 150

## (undated) DEVICE — MAT FLR ABS W/BLU/LINER 56X72IN WHT

## (undated) DEVICE — PK CATH CARD 10

## (undated) DEVICE — GW FC J TFE/COAT .025 3MM 180CM

## (undated) DEVICE — MODEL BT2000 P/N 700287-012KIT CONTENTS: MANIFOLD WITH SALINE AND CONTRAST PORTS, SALINE TUBING WITH SPIKE AND HAND SYRINGE, TRANSDUCER: Brand: BT2000 AUTOMATED MANIFOLD KIT

## (undated) DEVICE — STRYKER PERFORMANCE SERIES SAGITTAL BLADE: Brand: STRYKER PERFORMANCE SERIES

## (undated) DEVICE — SLV SCD LEG COMFORT KENDALLSCD MD REPROC

## (undated) DEVICE — LIMB HOLDERS: Brand: DEROYAL

## (undated) DEVICE — Device

## (undated) DEVICE — ST EXT IV SMARTSITE 2VLV SP M LL 5ML IV1

## (undated) DEVICE — CANN NASL CO2 DIVIDED A/

## (undated) DEVICE — ST ACC MICROPUNCTURE .018 TRANSLSS/SS/TP 5F/10CM 21G/7CM

## (undated) DEVICE — SUT VIC 2/0 CT1 36IN

## (undated) DEVICE — TOTAL ANTERIOR HIP-LF: Brand: MEDLINE INDUSTRIES, INC.

## (undated) DEVICE — CAUTERY TIP POLISHER: Brand: DEVON

## (undated) DEVICE — CO-SET DELIVERY SYSTEM FOR 123 ROOM TEMPATURE INJECTATE: Brand: CO-SET+

## (undated) DEVICE — GLV SURG SENSICARE SLT PF LF 7 STRL

## (undated) DEVICE — MEDI-VAC YANKAUER SUCTION HANDLE W/BULBOUS TIP: Brand: CARDINAL HEALTH

## (undated) DEVICE — SOL NACL 0.9PCT 1000ML

## (undated) DEVICE — PEEL-AWAY TOGA, 2X LARGE: Brand: FLYTE

## (undated) DEVICE — BIPOLAR SEALER 23-112-1 AQM 6.0: Brand: AQUAMANTYS™

## (undated) DEVICE — PULLOVER TOGA, 2X LARGE: Brand: FLYTE, SURGICOOL

## (undated) DEVICE — GLV SURG SENSICARE PI PF LF 7 GRN STRL

## (undated) DEVICE — CATH DIAG EXPO M/ PK 6FR FL4/FR4 PIG 3PK

## (undated) DEVICE — GLV SURG SENSICARE SLT PF LF 6.5 STRL

## (undated) DEVICE — APPL CHLORAPREP HI/LITE 26ML ORNG

## (undated) DEVICE — KT MANIFOLD CATHLAB CUST

## (undated) DEVICE — GLV SURG BIOGEL LTX PF 8 1/2

## (undated) DEVICE — KT PT POSITION SUPINE HANA/PROFX TABL